# Patient Record
Sex: FEMALE | Race: BLACK OR AFRICAN AMERICAN | NOT HISPANIC OR LATINO | Employment: OTHER | URBAN - METROPOLITAN AREA
[De-identification: names, ages, dates, MRNs, and addresses within clinical notes are randomized per-mention and may not be internally consistent; named-entity substitution may affect disease eponyms.]

---

## 2017-04-26 ENCOUNTER — OFFICE VISIT (OUTPATIENT)
Dept: INTERNAL MEDICINE | Facility: CLINIC | Age: 57
End: 2017-04-26
Payer: COMMERCIAL

## 2017-04-26 VITALS
BODY MASS INDEX: 26.27 KG/M2 | OXYGEN SATURATION: 99 % | DIASTOLIC BLOOD PRESSURE: 64 MMHG | SYSTOLIC BLOOD PRESSURE: 112 MMHG | HEIGHT: 64 IN | HEART RATE: 71 BPM | WEIGHT: 153.88 LBS

## 2017-04-26 DIAGNOSIS — F41.8 DEPRESSION WITH ANXIETY: ICD-10-CM

## 2017-04-26 DIAGNOSIS — E11.9 TYPE 2 DIABETES MELLITUS WITHOUT COMPLICATION, WITHOUT LONG-TERM CURRENT USE OF INSULIN: ICD-10-CM

## 2017-04-26 DIAGNOSIS — E04.2 MULTINODULAR GOITER (NONTOXIC): ICD-10-CM

## 2017-04-26 DIAGNOSIS — M75.02 ADHESIVE CAPSULITIS OF LEFT SHOULDER: ICD-10-CM

## 2017-04-26 DIAGNOSIS — K21.9 GERD WITHOUT ESOPHAGITIS: ICD-10-CM

## 2017-04-26 DIAGNOSIS — M48.02 FORAMINAL STENOSIS OF CERVICAL REGION: ICD-10-CM

## 2017-04-26 DIAGNOSIS — Z00.00 VISIT FOR ANNUAL HEALTH EXAMINATION: Primary | ICD-10-CM

## 2017-04-26 DIAGNOSIS — I10 BENIGN ESSENTIAL HTN: ICD-10-CM

## 2017-04-26 DIAGNOSIS — M72.2 PLANTAR FASCIITIS OF RIGHT FOOT: ICD-10-CM

## 2017-04-26 DIAGNOSIS — E78.2 MIXED HYPERLIPIDEMIA: ICD-10-CM

## 2017-04-26 PROCEDURE — 3074F SYST BP LT 130 MM HG: CPT | Mod: S$GLB,,, | Performed by: INTERNAL MEDICINE

## 2017-04-26 PROCEDURE — 4010F ACE/ARB THERAPY RXD/TAKEN: CPT | Mod: S$GLB,,, | Performed by: INTERNAL MEDICINE

## 2017-04-26 PROCEDURE — 3078F DIAST BP <80 MM HG: CPT | Mod: S$GLB,,, | Performed by: INTERNAL MEDICINE

## 2017-04-26 PROCEDURE — 99999 PR PBB SHADOW E&M-NEW PATIENT-LVL III: CPT | Mod: PBBFAC,,, | Performed by: INTERNAL MEDICINE

## 2017-04-26 PROCEDURE — 1160F RVW MEDS BY RX/DR IN RCRD: CPT | Mod: S$GLB,,, | Performed by: INTERNAL MEDICINE

## 2017-04-26 PROCEDURE — 99204 OFFICE O/P NEW MOD 45 MIN: CPT | Mod: S$GLB,,, | Performed by: INTERNAL MEDICINE

## 2017-04-26 RX ORDER — METFORMIN HYDROCHLORIDE EXTENDED-RELEASE TABLETS 500 MG/1
500 TABLET, FILM COATED, EXTENDED RELEASE ORAL
COMMUNITY
End: 2017-04-26

## 2017-04-26 RX ORDER — AMLODIPINE BESYLATE 5 MG/1
TABLET ORAL
COMMUNITY
Start: 2017-04-23 | End: 2017-04-26 | Stop reason: SDUPTHER

## 2017-04-26 RX ORDER — LISINOPRIL 40 MG/1
40 TABLET ORAL DAILY
Qty: 90 TABLET | Refills: 1 | Status: SHIPPED | OUTPATIENT
Start: 2017-04-26 | End: 2017-12-13 | Stop reason: SDUPTHER

## 2017-04-26 RX ORDER — METOPROLOL SUCCINATE 50 MG/1
50 TABLET, EXTENDED RELEASE ORAL DAILY
Qty: 90 TABLET | Refills: 1 | Status: SHIPPED | OUTPATIENT
Start: 2017-04-26 | End: 2017-12-13 | Stop reason: SDUPTHER

## 2017-04-26 RX ORDER — PRAVASTATIN SODIUM 80 MG/1
80 TABLET ORAL DAILY
Qty: 90 TABLET | Refills: 1 | Status: SHIPPED | OUTPATIENT
Start: 2017-04-26 | End: 2017-04-28

## 2017-04-26 RX ORDER — DORZOLAMIDE HYDROCHLORIDE AND TIMOLOL MALEATE 20; 5 MG/ML; MG/ML
SOLUTION/ DROPS OPHTHALMIC
Refills: 5 | COMMUNITY
Start: 2017-03-02

## 2017-04-26 RX ORDER — SERTRALINE HYDROCHLORIDE 25 MG/1
25 TABLET, FILM COATED ORAL DAILY
Qty: 90 TABLET | Refills: 1 | Status: SHIPPED | OUTPATIENT
Start: 2017-04-26 | End: 2017-12-13 | Stop reason: SDUPTHER

## 2017-04-26 RX ORDER — METFORMIN HYDROCHLORIDE 500 MG/1
500 TABLET ORAL 2 TIMES DAILY WITH MEALS
Qty: 180 TABLET | Refills: 1 | Status: SHIPPED | OUTPATIENT
Start: 2017-04-26 | End: 2017-12-13

## 2017-04-26 RX ORDER — LATANOPROST 50 UG/ML
SOLUTION/ DROPS OPHTHALMIC
COMMUNITY
Start: 2017-04-23

## 2017-04-26 RX ORDER — AMLODIPINE BESYLATE 5 MG/1
5 TABLET ORAL DAILY
Qty: 90 TABLET | Refills: 1 | Status: SHIPPED | OUTPATIENT
Start: 2017-04-26 | End: 2017-12-13 | Stop reason: SDUPTHER

## 2017-04-26 RX ORDER — LISINOPRIL 40 MG/1
40 TABLET ORAL DAILY
Refills: 1 | COMMUNITY
Start: 2017-03-02 | End: 2017-04-26 | Stop reason: SDUPTHER

## 2017-04-26 NOTE — MR AVS SNAPSHOT
[facility]  [address]  [facility] [address]  Phone: [phone]  Fax: [phone]                  Suni Rodríguez   2017 11:00 AM   Office Visit    Description:  Female : 1960   Provider:  Jade Chin MD   Department:  [facility]           Reason for Visit     Establish Care           Diagnoses this Visit        Comments    Type 2 diabetes mellitus without complication, without long-term current use of insulin         Benign essential HTN         Mixed hyperlipidemia         Depression with anxiety         Multinodular goiter (nontoxic)         GERD without esophagitis         Adhesive capsulitis of left shoulder                To Do List           Future Appointments        Provider Department Dept Phone    2017 11:50 AM LAB, APPOINTMENT [facility] [phone]    2017 12:10 PM LAB, APPOINTMENT [facility] [phone]      Goals (5 Years of Data)     None       These Medications        Disp Refills Start End    metformin (GLUCOPHAGE) 500 MG tablet 180 tablet 1 2017    Take 1 tablet (500 mg total) by mouth 2 (two) times daily with meals. - Oral    Pharmacy: [facility]/pharmacy #[address] [place], [place] [address]  [address] Ph #: [phone]       pravastatin (PRAVACHOL) 80 MG tablet 90 tablet 1 2017    Take 1 tablet (80 mg total) by mouth once daily. - Oral    Pharmacy: [facility]/pharmacy #[address] [place], [place] [address] - [address] Ph #: [phone]       metoprolol succinate (TOPROL-XL) 50 MG 24 hr tablet 90 tablet 1 2017    Take 1 tablet (50 mg total) by mouth once daily. - Oral    Pharmacy: [facility]/pharmacy #[address] [place] [address] [place] [address] Ph #: [phone]       sertraline (ZOLOFT) 25 MG tablet 90 tablet 1 2017    Take 1 tablet (25 mg total) by mouth once daily. - Oral    Pharmacy: [facility]/pharmacy #[address] [place] [address]  [facility] [address] [facility] Ph #: [phone]       amlodipine (NORVASC) 5 MG tablet 90 tablet 1 4/26/2017     Take 1 tablet (5 mg total) by mouth once daily. - Oral    Pharmacy: [facility] [id number] [place] [address] [facility] Ph #: [phone]       lisinopril (PRINIVIL,ZESTRIL) 40 MG tablet 90 tablet 1 4/26/2017     Take 1 tablet (40 mg total) by mouth once daily. - Oral    Pharmacy: [facility]/pharmacy #[id number] - [place] - [address] [facility] Ph #: [phone]         [facility]     [facility] Nurse Care Line - 24/7 Assistance  Unless otherwise directed by your provider, please contact [facility], our nurse care line that is available for 24/7 assistance.     Registered nurses in the [facility] provide: appointment scheduling, clinical advisement, health education, and other advisory services.  Call: [phone] (toll free)               Medications           Message regarding Medications     Verify the changes and/or additions to your medication regime listed below are the same as discussed with your clinician today.  If any of these changes or additions are incorrect, please notify your healthcare provider.        START taking these NEW medications        Refills    metformin (GLUCOPHAGE) 500 MG tablet 1    Sig: Take 1 tablet (500 mg total) by mouth 2 (two) times daily with meals.    Class: Normal    Route: Oral    pravastatin (PRAVACHOL) 80 MG tablet 1    Sig: Take 1 tablet (80 mg total) by mouth once daily.    Class: Normal    Route: Oral    metoprolol succinate (TOPROL-XL) 50 MG 24 hr tablet 1    Sig: Take 1 tablet (50 mg total) by mouth once daily.    Class: Normal    Route: Oral    sertraline (ZOLOFT) 25 MG tablet 1    Sig: Take 1 tablet (25 mg total) by mouth once daily.    Class: Normal    Route: Oral    amlodipine (NORVASC) 5 MG tablet 1    Sig: Take 1 tablet (5 mg total) by mouth once daily.    Class: Normal    Route: Oral    lisinopril (PRINIVIL,ZESTRIL) 40 MG tablet  "1    Sig: Take 1 tablet (40 mg total) by mouth once daily.    Class: Normal    Route: Oral      STOP taking these medications     metformin (FORTAMET) 500 mg 24 hr tablet Take 500 mg by mouth daily with breakfast.    METOPROLOL TARTRATE ORAL Take 50 mg by mouth once daily.           Verify that the below list of medications is an accurate representation of the medications you are currently taking.  If none reported, the list may be blank. If incorrect, please contact your healthcare provider. Carry this list with you in case of emergency.           Current Medications     amlodipine (NORVASC) 5 MG tablet Take 1 tablet (5 mg total) by mouth once daily.    dorzolamide-timolol 2-0.5% (COSOPT) 22.3-6.8 mg/mL ophthalmic solution INSTILL 1 DROP INTO BOTH EYES 2 TIMES DAILY.    latanoprost 0.005 % ophthalmic solution     lisinopril (PRINIVIL,ZESTRIL) 40 MG tablet Take 1 tablet (40 mg total) by mouth once daily.    metformin (GLUCOPHAGE) 500 MG tablet Take 1 tablet (500 mg total) by mouth 2 (two) times daily with meals.    metoprolol succinate (TOPROL-XL) 50 MG 24 hr tablet Take 1 tablet (50 mg total) by mouth once daily.    pravastatin (PRAVACHOL) 80 MG tablet Take 1 tablet (80 mg total) by mouth once daily.    sertraline (ZOLOFT) 25 MG tablet Take 1 tablet (25 mg total) by mouth once daily.           Clinical Reference Information           Your Vitals Were     BP Pulse Height Weight SpO2 BMI    112/64 (BP Location: Left arm, Patient Position: Sitting) 71 5' 4" (1.626 m) 69.8 kg (153 lb 14.1 oz) 99% 26.41 kg/m2      Blood Pressure          Most Recent Value    BP  112/64      Allergies as of 4/26/2017     Penicillins      Immunizations Administered on Date of Encounter - 4/26/2017     None      Orders Placed During Today's Visit     Future Labs/Procedures Expected by Expires    Comprehensive metabolic panel  4/26/2017 6/25/2018    Hemoglobin A1c  4/26/2017 6/25/2018    Lipid panel  4/26/2017 6/25/2018    MICROALBUMIN / " CREATININE RATIO URINE  4/26/2017 6/25/2018      [facility] Sign-Up     Activating your [facility] account is as easy as 1-2-3!     1) Visit [email or web address], select Sign Up Now, enter this activation code and your date of birth, then select Next.  [email or web address]  Expires: 6/10/2017 10:12 AM      2) Create a username and password to use when you visit [facility] in the future and select a security question in case you lose your password and select Next.    3) Enter your e-mail address and click Sign Up!    Additional Information  If you have questions, please e-mail [email or web address] or call [phone] to talk to our [facility] staff. Remember, [facility] is NOT to be used for urgent needs. For medical emergencies, dial 911.         Language Assistance Services     ATTENTION: Language assistance services are available, free of charge. Please call [phone].      ATENCIÓN: Si habla español, tiene a yarbrough disposición servicios gratuitos de asistencia lingüística. Llame al [phone].     HOWARD Ý: N?u b?n nói Ti?ng Vi?t, có các d?ch v? h? tr? ngôn ng? mi?n phí dành cho b?n. G?i s? [phone].         [facility] complies with applicable Federal civil rights laws and does not discriminate on the basis of race, color, national origin, age, disability, or sex.

## 2017-04-26 NOTE — PROGRESS NOTES
"INTERNAL MEDICINE INITIAL VISIT NOTE      CHIEF COMPLAINT     Chief Complaint   Patient presents with    Establish Care       HPI     Suni Rodríguez is a 56 y.o. AA female who presents with a PMHx of :    Past Medical History:  Past Medical History:   Diagnosis Date    Adhesive capsulitis of left shoulder     Benign essential HTN     Depression with anxiety     GERD without esophagitis     Glaucoma     Mixed hyperlipidemia     Multinodular goiter (nontoxic)     Uncomplicated type 2 diabetes mellitus      Here to re-establish care from [facility].  Pt known to me c last visit at [facility] in Dec 2016.  At last visit in Dec, pt reported some worsening anxiety sx.  Had prev been on Sertraline but says it "made her too happy" so was started on Fluoxetine instead.  Says  Fluoxetine made her feel depressed and made her mood worse so she stopped taking it and would like to get back on Sertraline at a low dose.    Says she has only been taking metformin once daily but taking all other meds as rx'ed.    Also notes some R foot pain, from just under her R heel towards the middle of her arch which started about a week or two ago.  Thinks she got out of bed too quickly.  Says her pain improved but then waxes and wanes.  No swelling.  Has not tried taking anything for it.    Denies cp or sob.  No wheezing or cough.      Past Surgical History:  History reviewed. No pertinent surgical history.    Home Medications:  Prior to Admission medications    Medication Sig Start Date End Date Taking? Authorizing Provider   amlodipine (NORVASC) 5 MG tablet Take 1 tablet (5 mg total) by mouth once daily. 4/26/17  Yes Jade Chin MD   dorzolamide-timolol 2-0.5% (COSOPT) 22.3-6.8 mg/mL ophthalmic solution INSTILL 1 DROP INTO BOTH EYES 2 TIMES DAILY. 3/2/17  Yes Historical Provider, MD   latanoprost 0.005 % ophthalmic solution  4/23/17  Yes Historical Provider, MD   lisinopril (PRINIVIL,ZESTRIL) 40 MG tablet Take 1 tablet (40 mg total) by mouth " once daily. 4/26/17  Yes Jade Chin MD   amlodipine (NORVASC) 5 MG tablet  4/23/17 4/26/17 Yes Historical Provider, MD   lisinopril (PRINIVIL,ZESTRIL) 40 MG tablet Take 40 mg by mouth once daily. 3/2/17 4/26/17 Yes Historical Provider, MD   metformin (FORTAMET) 500 mg 24 hr tablet Take 500 mg by mouth daily with breakfast.  4/26/17 Yes Historical Provider, MD   METOPROLOL TARTRATE ORAL Take 50 mg by mouth once daily.  4/26/17 Yes Historical Provider, MD   metformin (GLUCOPHAGE) 500 MG tablet Take 1 tablet (500 mg total) by mouth 2 (two) times daily with meals. 4/26/17 4/26/18  Jade Chin MD   metoprolol succinate (TOPROL-XL) 50 MG 24 hr tablet Take 1 tablet (50 mg total) by mouth once daily. 4/26/17 4/26/18  Jade Chin MD   pravastatin (PRAVACHOL) 80 MG tablet Take 1 tablet (80 mg total) by mouth once daily. 4/26/17 4/26/18  Jade Chin MD   sertraline (ZOLOFT) 25 MG tablet Take 1 tablet (25 mg total) by mouth once daily. 4/26/17 4/26/18  Jade Chin MD       Allergies:  Review of patient's allergies indicates:   Allergen Reactions    Penicillins Hives       Family History:  Family History   Problem Relation Age of Onset    Coronary artery disease Mother      hx MI    Diabetes Mother     Hypertension Mother     Thyroid disease Mother     Hypertension Father     Prostate cancer Father     Glaucoma Father     Asthma Sister        Social History:  Social History   Substance Use Topics    Smoking status: Former Smoker     Packs/day: 1.00     Years: 20.00    Smokeless tobacco: None      Comment: quit smoking in 1996    Alcohol use Yes      Comment: rare use       Review of Systems:  Review of Systems   Constitutional: Negative for appetite change, chills, fatigue, fever and unexpected weight change.   HENT: Negative for congestion, hearing loss and rhinorrhea.    Eyes: Negative for pain and visual disturbance.   Respiratory: Negative for cough, chest tightness, shortness of breath and wheezing.   "  Cardiovascular: Negative for chest pain, palpitations and leg swelling.   Gastrointestinal: Negative for abdominal distention and abdominal pain.   Endocrine: Negative for polydipsia and polyuria.   Genitourinary: Negative for decreased urine volume, difficulty urinating, dysuria, hematuria and vaginal discharge.   Neurological: Negative for weakness, numbness and headaches.   Psychiatric/Behavioral: Negative for behavioral problems and confusion.       Health Maintenance:   Immunizations:   Influenza is up to date 12/2016  TDap is up to date 1/2016  Pneumovax is up to date. 2013 since DM    Cancer Screening:  PAP: is up to date. 10/2015 neg for malignancy, neg HPV via Dr. Coelho  Mammogram: is up to date. 12/2016 benign at [facility]  Colonoscopy: is up to date. 11/2009 c tubular adenoma, repeat csc 3/2016 c severe diverticulosis L colon, rec f/u 5 yrs based on polyp hx per report (Dr. Ruiz)  DEXA:  is up to date. 9/2015    PHYSICAL EXAM     /64 (BP Location: Left arm, Patient Position: Sitting)  Pulse 71  Ht 5' 4" (1.626 m)  Wt 69.8 kg (153 lb 14.1 oz)  SpO2 99%  BMI 26.41 kg/m2    GEN - A+OX4, NAD   HEENT - PERRL, EOMI, OP clear  Neck - No thyromegaly or cervical LAD. No thyroid masses felt.  CV - RRR, no m/r/g  Chest - CTAB, no wheezing, crackles, or rhonchi  Abd - S/NT/ND/+BS.   Ext - 2+BDP and radial pulses. No C/C/E.  Neuro - 5/5 BUE and BLE strength.  LN - No axillary or inguinal LAD appreciated.  Skin - Normal color and texture, no rash, no skin lesions.  Foot exam completed today.  No decreased sensation with monofilament bilaterally.  No ulcerations noted.  No calluses.  2+ distal pulses bilaterally.  Some tightness noted along plantar fascia      LABS     To be done today    ASSESSMENT/PLAN     Suni Rodríguez is a 56 y.o. female with  Suni was seen today for establish care.    Diagnoses and all orders for this visit:    Visit for annual health examination  --hx and physical exam completed, " HM reviewed.    Type 2 diabetes mellitus without complication, without long-term current use of insulin  -     Last a1c in Nov 6.6, well controlled, will cont metformin (GLUCOPHAGE) 500 MG tablet; Take 1 tablet (500 mg total) by mouth 2 (two) times daily with meals. (although pt has only been taking once daily).  Will repeat labs now.    - ophtho appt utd, last seen 3/2017 at [facility], on drops for glaucoma.  - Foot exam completed today as above.  -     Comprehensive metabolic panel; Future; Expected date: 4/26/17  -     Hemoglobin A1c; Future; Expected date: 4/26/17  -     MICROALBUMIN / CREATININE RATIO URINE; Future; Expected date: 4/26/17    Benign essential HTN  -     At goal on current regimen.  Cont meds but change metoprolol to XL (pt was taking tartrate once daily), refills sent for all bp meds.  - metoprolol succinate (TOPROL-XL) 50 MG 24 hr tablet; Take 1 tablet (50 mg total) by mouth once daily.  -     amlodipine (NORVASC) 5 MG tablet; Take 1 tablet (5 mg total) by mouth once daily.  -     lisinopril (PRINIVIL,ZESTRIL) 40 MG tablet; Take 1 tablet (40 mg total) by mouth once daily.    Mixed hyperlipidemia  -     Last LDL at goal, 94 in Nov from [facility].  - Will cont pravastatin (PRAVACHOL) 80 MG tablet; Take 1 tablet (80 mg total) by mouth once daily and recheck labs:  -     Lipid panel; Future; Expected date: 4/26/17    Depression with anxiety  -     As per hpi, worsening sx on Fluoxetine so will change back to sertraline (ZOLOFT) 25 MG tablet; Take 1 tablet (25 mg total) by mouth once daily.    Multinodular goiter (nontoxic)   -last TSH in Nov wnl.  Had FNA done recently at [facility] as ordered by Dr. Carlsno but says she never got records, will request today, advised pt to call back to Dr. Carlson's office for results.     GERD without esophagitis   - stable, no acute issues.    Plantar fasciitis R foot   -as per hpi, advised to avoid walking barefoot, avoid flip flops, wears shoes c good cushioned  support.   -counseled on stretching exercises, can also take Naproxen otc bid for 5 days, then stop.    Adhesive capsulitis of left shoulder   -also c hx cervical foraminal stenosis on MRI at [facility] at C6/C7 (prev seeing Dr. Meyer).   -sx c continued improvement, still doing PT and doing well, cont conservative mgmt.    Hx uterine fibroids--no issues, f/u gyn as needed.    Hx vit d Def--last check wnl, can cont maintenance dose.    HM as above    RTC in 6 months, sooner if needed and depending on labs.    Jade Chin MD  Department of Internal Medicine - [facility]  04/26/2017  12:07 PM

## 2017-04-28 ENCOUNTER — TELEPHONE (OUTPATIENT)
Dept: INTERNAL MEDICINE | Facility: CLINIC | Age: 57
End: 2017-04-28

## 2017-04-28 DIAGNOSIS — E78.2 MIXED HYPERLIPIDEMIA: Primary | ICD-10-CM

## 2017-04-28 RX ORDER — ATORVASTATIN CALCIUM 80 MG/1
80 TABLET, FILM COATED ORAL DAILY
Qty: 90 TABLET | Refills: 1 | Status: SHIPPED | OUTPATIENT
Start: 2017-04-28 | End: 2018-01-30 | Stop reason: SDUPTHER

## 2017-04-28 NOTE — TELEPHONE ENCOUNTER
Left detailed VM message regarding results as we have called several times at both numbers, no answer.  DM at goal.  Cholesterol elevated, will stop pravastatin and send rx for Atorvastatin 80.  Pt to call back if questions.

## 2017-11-27 ENCOUNTER — IMMUNIZATION (OUTPATIENT)
Dept: INTERNAL MEDICINE | Facility: CLINIC | Age: 57
End: 2017-11-27
Payer: COMMERCIAL

## 2017-11-27 PROCEDURE — 90686 IIV4 VACC NO PRSV 0.5 ML IM: CPT | Mod: S$GLB,,, | Performed by: INTERNAL MEDICINE

## 2017-11-27 PROCEDURE — 90471 IMMUNIZATION ADMIN: CPT | Mod: S$GLB,,, | Performed by: INTERNAL MEDICINE

## 2017-12-13 ENCOUNTER — HOSPITAL ENCOUNTER (OUTPATIENT)
Dept: RADIOLOGY | Facility: HOSPITAL | Age: 57
Discharge: HOME | End: 2017-12-13
Attending: INTERNAL MEDICINE
Payer: COMMERCIAL

## 2017-12-13 ENCOUNTER — OFFICE VISIT (OUTPATIENT)
Dept: INTERNAL MEDICINE | Facility: CLINIC | Age: 57
End: 2017-12-13
Payer: COMMERCIAL

## 2017-12-13 VITALS
BODY MASS INDEX: 25.78 KG/M2 | HEIGHT: 64 IN | WEIGHT: 151 LBS | OXYGEN SATURATION: 99 % | SYSTOLIC BLOOD PRESSURE: 108 MMHG | DIASTOLIC BLOOD PRESSURE: 62 MMHG | HEART RATE: 74 BPM

## 2017-12-13 DIAGNOSIS — F41.8 DEPRESSION WITH ANXIETY: ICD-10-CM

## 2017-12-13 DIAGNOSIS — E78.2 MIXED HYPERLIPIDEMIA: ICD-10-CM

## 2017-12-13 DIAGNOSIS — E11.9 TYPE 2 DIABETES MELLITUS WITHOUT COMPLICATION, WITHOUT LONG-TERM CURRENT USE OF INSULIN: Primary | ICD-10-CM

## 2017-12-13 DIAGNOSIS — K21.9 GERD WITHOUT ESOPHAGITIS: ICD-10-CM

## 2017-12-13 DIAGNOSIS — Z12.31 VISIT FOR SCREENING MAMMOGRAM: ICD-10-CM

## 2017-12-13 DIAGNOSIS — I10 BENIGN ESSENTIAL HTN: ICD-10-CM

## 2017-12-13 DIAGNOSIS — E04.2 MULTINODULAR GOITER (NONTOXIC): ICD-10-CM

## 2017-12-13 PROBLEM — M72.2 PLANTAR FASCIITIS OF RIGHT FOOT: Status: RESOLVED | Noted: 2017-04-26 | Resolved: 2017-12-13

## 2017-12-13 PROCEDURE — 77067 SCR MAMMO BI INCL CAD: CPT | Mod: 26,,, | Performed by: RADIOLOGY

## 2017-12-13 PROCEDURE — 99999 PR PBB SHADOW E&M-EST. PATIENT-LVL III: CPT | Mod: PBBFAC,,, | Performed by: INTERNAL MEDICINE

## 2017-12-13 PROCEDURE — 77067 SCR MAMMO BI INCL CAD: CPT | Mod: TC

## 2017-12-13 PROCEDURE — 99214 OFFICE O/P EST MOD 30 MIN: CPT | Mod: S$GLB,,, | Performed by: INTERNAL MEDICINE

## 2017-12-13 RX ORDER — METFORMIN HYDROCHLORIDE 500 MG/1
500 TABLET, EXTENDED RELEASE ORAL
Qty: 90 TABLET | Refills: 3 | Status: SHIPPED | OUTPATIENT
Start: 2017-12-13 | End: 2019-09-03 | Stop reason: SDUPTHER

## 2017-12-13 RX ORDER — SERTRALINE HYDROCHLORIDE 25 MG/1
25 TABLET, FILM COATED ORAL DAILY
Qty: 90 TABLET | Refills: 1 | Status: SHIPPED | OUTPATIENT
Start: 2017-12-13 | End: 2018-06-17 | Stop reason: SDUPTHER

## 2017-12-13 RX ORDER — LISINOPRIL 40 MG/1
40 TABLET ORAL DAILY
Qty: 90 TABLET | Refills: 1 | Status: SHIPPED | OUTPATIENT
Start: 2017-12-13 | End: 2018-07-30 | Stop reason: SDUPTHER

## 2017-12-13 RX ORDER — AMLODIPINE BESYLATE 5 MG/1
5 TABLET ORAL DAILY
Qty: 90 TABLET | Refills: 1 | Status: SHIPPED | OUTPATIENT
Start: 2017-12-13 | End: 2018-01-24 | Stop reason: SDUPTHER

## 2017-12-13 RX ORDER — METOPROLOL SUCCINATE 50 MG/1
50 TABLET, EXTENDED RELEASE ORAL DAILY
Qty: 90 TABLET | Refills: 1 | Status: SHIPPED | OUTPATIENT
Start: 2017-12-13 | End: 2018-06-17 | Stop reason: SDUPTHER

## 2017-12-13 NOTE — PROGRESS NOTES
INTERNAL MEDICINE ESTABLISHED PATIENT VISIT NOTE    Subjective:     Chief Complaint: Follow-up  DM, HTN     Patient ID: Suni Rodríguez is a 57 y.o. female with PMHx of HTN, type 2 DM uncomplicated, depression c anxiety, GERD, glaucoma, nontoxic MNG, hx adhesive capsulitis of L shoulder, here for f/u HTN, DM.    At last appt, Fluoxetine was switched to sertraline as she reported doing well on the latter.  Feeling well currently with that regard.    At last appt, labs done which showed significant worsening of LDL and we rec changing pravastatin 80 to atorvastatin but were unable to get in touch c the pt so currently still on pravastatin.    Reports plantar fasciitis noted at last appt now resolved.    Past Medical History:   Diagnosis Date    Adhesive capsulitis of left shoulder     Benign essential HTN     Depression with anxiety     GERD without esophagitis     Glaucoma     Mixed hyperlipidemia     Multinodular goiter (nontoxic)     Uncomplicated type 2 diabetes mellitus      Medication List with Changes/Refills   New Medications    METFORMIN (GLUCOPHAGE-XR) 500 MG 24 HR TABLET    Take 1 tablet (500 mg total) by mouth daily with breakfast.   Current Medications    ATORVASTATIN (LIPITOR) 80 MG TABLET    Take 1 tablet (80 mg total) by mouth once daily.    DORZOLAMIDE-TIMOLOL 2-0.5% (COSOPT) 22.3-6.8 MG/ML OPHTHALMIC SOLUTION    INSTILL 1 DROP INTO BOTH EYES 2 TIMES DAILY.    LATANOPROST 0.005 % OPHTHALMIC SOLUTION       Changed and/or Refilled Medications    Modified Medication Previous Medication    AMLODIPINE (NORVASC) 5 MG TABLET amlodipine (NORVASC) 5 MG tablet       Take 1 tablet (5 mg total) by mouth once daily.    Take 1 tablet (5 mg total) by mouth once daily.    LISINOPRIL (PRINIVIL,ZESTRIL) 40 MG TABLET lisinopril (PRINIVIL,ZESTRIL) 40 MG tablet       Take 1 tablet (40 mg total) by mouth once daily.    Take 1 tablet (40 mg total) by mouth once daily.    METOPROLOL SUCCINATE (TOPROL-XL) 50 MG 24  HR TABLET metoprolol succinate (TOPROL-XL) 50 MG 24 hr tablet       Take 1 tablet (50 mg total) by mouth once daily.    Take 1 tablet (50 mg total) by mouth once daily.    SERTRALINE (ZOLOFT) 25 MG TABLET sertraline (ZOLOFT) 25 MG tablet       Take 1 tablet (25 mg total) by mouth once daily.    Take 1 tablet (25 mg total) by mouth once daily.   Discontinued Medications    METFORMIN (GLUCOPHAGE) 500 MG TABLET    Take 1 tablet (500 mg total) by mouth 2 (two) times daily with meals.     Review of patient's allergies indicates:   Allergen Reactions    Penicillins Hives     Social History     Social History    Marital status:      Spouse name: N/A    Number of children: N/A    Years of education: N/A     Occupational History    Not on file.     Social History Main Topics    Smoking status: Former Smoker     Packs/day: 1.00     Years: 20.00    Smokeless tobacco: Not on file      Comment: quit smoking in 1996    Alcohol use Yes      Comment: rare use    Drug use: No    Sexual activity: Not on file     Other Topics Concern    Not on file     Social History Narrative    retired        Review of Systems   Constitutional: Negative for chills, fatigue and fever.   Respiratory: Negative for cough, chest tightness and shortness of breath.    Cardiovascular: Negative for chest pain.   Gastrointestinal: Negative for abdominal pain and blood in stool.   Genitourinary: Negative for dysuria and frequency.   Psychiatric/Behavioral: Positive for dysphoric mood (improved since last appt).         Health Maintenance:   Immunizations:   Influenza is up to date 11/2017  TDap is up to date 1/2016  Pneumovax is up to date. 2013 since DM     Cancer Screening:  PAP: is up to date. 10/2015 neg for malignancy, neg HPV via Dr. Coelho  Mammogram: is up to date. 12/2016 benign at [facility], will schedule repeat here  Colonoscopy: is up to date. 11/2009 c tubular adenoma, repeat csc 3/2016 c severe diverticulosis L colon, rec f/u 5 yrs  "based on polyp hx per report (Dr. Ruiz)  DEXA:  is up to date. 9/2015 wnl    Objective:   /62 (BP Location: Left arm, Patient Position: Sitting)   Pulse 74   Ht 5' 4" (1.626 m)   Wt 68.5 kg (151 lb 0.2 oz)   SpO2 99%   BMI 25.92 kg/m²        General: AAO x3, no apparent distress  CV: RRR, no m/r/g  Pulm: Lungs CTAB, no crackles, no wheezes  Abd: s/NT/ND +BS  Extremities: no c/c/e  Foot exam completed today.  No decreased sensation with monofilament bilaterally.  No ulcerations noted.  No calluses.  2+ distal pulses bilaterally.      Labs:     Lab Results   Component Value Date    WBC 7.49 12/13/2017    HGB 12.8 12/13/2017    HCT 38.2 12/13/2017    MCV 87 12/13/2017     12/13/2017     CMP  Sodium   Date Value Ref Range Status   04/26/2017 141 136 - 145 mmol/L Final     Potassium   Date Value Ref Range Status   04/26/2017 4.8 3.5 - 5.1 mmol/L Final     Chloride   Date Value Ref Range Status   04/26/2017 107 95 - 110 mmol/L Final     CO2   Date Value Ref Range Status   04/26/2017 26 23 - 29 mmol/L Final     Glucose   Date Value Ref Range Status   04/26/2017 103 70 - 110 mg/dL Final     BUN, Bld   Date Value Ref Range Status   04/26/2017 10 6 - 20 mg/dL Final     Creatinine   Date Value Ref Range Status   04/26/2017 0.8 0.5 - 1.4 mg/dL Final     Calcium   Date Value Ref Range Status   04/26/2017 10.5 8.7 - 10.5 mg/dL Final     Total Protein   Date Value Ref Range Status   04/26/2017 7.7 6.0 - 8.4 g/dL Final     Albumin   Date Value Ref Range Status   04/26/2017 4.0 3.5 - 5.2 g/dL Final     Total Bilirubin   Date Value Ref Range Status   04/26/2017 0.4 0.1 - 1.0 mg/dL Final     Comment:     For infants and newborns, interpretation of results should be based  on gestational age, weight and in agreement with clinical  observations.  Premature Infant recommended reference ranges:  Up to 24 hours.............<8.0 mg/dL  Up to 48 hours............<12.0 mg/dL  3-5 days..................<15.0 mg/dL  6-29 " days.................<15.0 mg/dL       Alkaline Phosphatase   Date Value Ref Range Status   04/26/2017 103 55 - 135 U/L Final     AST   Date Value Ref Range Status   04/26/2017 19 10 - 40 U/L Final     ALT   Date Value Ref Range Status   04/26/2017 17 10 - 44 U/L Final     Anion Gap   Date Value Ref Range Status   04/26/2017 8 8 - 16 mmol/L Final     eGFR if    Date Value Ref Range Status   04/26/2017 >60.0 >60 mL/min/1.73 m^2 Final     eGFR if non    Date Value Ref Range Status   04/26/2017 >60.0 >60 mL/min/1.73 m^2 Final     Comment:     Calculation used to obtain the estimated glomerular filtration  rate (eGFR) is the CKD-EPI equation. Since race is unknown   in our information system, the eGFR values for   -American and Non--American patients are given   for each creatinine result.       Lab Results   Component Value Date    HGBA1C 6.6 (H) 04/26/2017     Lab Results   Component Value Date    LDLCALC 140.4 04/26/2017     No results found for: TSH      Assessment/Plan     Suni was seen today for follow-up.    Diagnoses and all orders for this visit:    Type 2 diabetes mellitus without complication, without long-term current use of insulin  At goal, stable at 6.6 on last check, will repeat labs now.  Eye exam at [facility], seen recently  Foot exam completed today  Microalb/cr 217  Has only been taking metformin once daily instead of bid, ok to change to XR  -     Comprehensive metabolic panel; Future  -     Hemoglobin A1c; Future  -     CBC auto differential; Future  -     metFORMIN (GLUCOPHAGE-XR) 500 MG 24 hr tablet; Take 1 tablet (500 mg total) by mouth daily with breakfast.    Benign essential HTN  at goal.  Cont current medications.   -     lisinopril (PRINIVIL,ZESTRIL) 40 MG tablet; Take 1 tablet (40 mg total) by mouth once daily.  -     amLODIPine (NORVASC) 5 MG tablet; Take 1 tablet (5 mg total) by mouth once daily.  -     metoprolol succinate (TOPROL-XL) 50 MG  24 hr tablet; Take 1 tablet (50 mg total) by mouth once daily.    Mixed hyperlipidemia  Lab Results   Component Value Date    LDLCALC 140.4 04/26/2017     Much worse from 94 prev at [facility].  Since pt never started rec atorvastatin, will repeat labs now and if LDL still over 100 will switch  Patient counseled on need for a low fat diet.  Avoid red meats and fried foods as well as cream-based foods and processed foods.  Recommend weight loss with diet and exercise.  -     Lipid panel; Future    Multinodular goiter (nontoxic)  Need [facility] records, prev requested from Dr. Carlson but not received, will request bx results today from [facility].  tsh last Nov wnl  -     TSH; Future    Depression with anxiety  Stable on current regimen, fluoxetine not as effective for pt  Cont meds  -     sertraline (ZOLOFT) 25 MG tablet; Take 1 tablet (25 mg total) by mouth once daily.    GERD without esophagitis  Stable on PPI when compliant c diet, no acute issues    Visit for screening mammogram  -     Mammo Digital Screening Bilat with CAD; Future        HM as above  RTC in 6 mos for f/u c labs prior, advised to call 1-2 weeks before f/u to get lab orders.    Jade Chin MD  Department of Internal Medicine - [facility]  12/13/2017

## 2017-12-15 ENCOUNTER — TELEPHONE (OUTPATIENT)
Dept: RADIOLOGY | Facility: HOSPITAL | Age: 57
End: 2017-12-15

## 2017-12-15 ENCOUNTER — TELEPHONE (OUTPATIENT)
Dept: INTERNAL MEDICINE | Facility: CLINIC | Age: 57
End: 2017-12-15

## 2017-12-15 NOTE — TELEPHONE ENCOUNTER
Spoke with patient and explained mammogram findings.Patient expressed understanding of results. Patient is scheduled for a abnormal mammogram follow up appointment at [facility] on 12/21/17

## 2017-12-19 ENCOUNTER — TELEPHONE (OUTPATIENT)
Dept: INTERNAL MEDICINE | Facility: CLINIC | Age: 57
End: 2017-12-19

## 2017-12-19 DIAGNOSIS — R92.8 ABNORMAL MAMMOGRAM: Primary | ICD-10-CM

## 2017-12-19 NOTE — TELEPHONE ENCOUNTER
mmg abnormal.  Pt notified.  Has appt for dx mmg +/- us this week, advised to keep appt.  Will place orders.

## 2017-12-20 ENCOUNTER — HOSPITAL ENCOUNTER (OUTPATIENT)
Dept: RADIOLOGY | Facility: HOSPITAL | Age: 57
Discharge: HOME | End: 2017-12-20
Attending: INTERNAL MEDICINE
Payer: COMMERCIAL

## 2017-12-20 DIAGNOSIS — R92.8 ABNORMAL MAMMOGRAM: ICD-10-CM

## 2017-12-20 PROCEDURE — 77061 BREAST TOMOSYNTHESIS UNI: CPT | Mod: TC,PO,LT

## 2017-12-20 PROCEDURE — 76642 ULTRASOUND BREAST LIMITED: CPT | Mod: TC,PO,LT

## 2017-12-20 PROCEDURE — 77065 DX MAMMO INCL CAD UNI: CPT | Mod: 26,LT,, | Performed by: STUDENT IN AN ORGANIZED HEALTH CARE EDUCATION/TRAINING PROGRAM

## 2017-12-20 PROCEDURE — 77061 BREAST TOMOSYNTHESIS UNI: CPT | Mod: 26,LT,, | Performed by: STUDENT IN AN ORGANIZED HEALTH CARE EDUCATION/TRAINING PROGRAM

## 2017-12-20 PROCEDURE — 76642 ULTRASOUND BREAST LIMITED: CPT | Mod: 26,LT,, | Performed by: STUDENT IN AN ORGANIZED HEALTH CARE EDUCATION/TRAINING PROGRAM

## 2018-01-24 DIAGNOSIS — I10 BENIGN ESSENTIAL HTN: ICD-10-CM

## 2018-01-24 RX ORDER — AMLODIPINE BESYLATE 5 MG/1
5 TABLET ORAL DAILY
Qty: 90 TABLET | Refills: 1 | Status: SHIPPED | OUTPATIENT
Start: 2018-01-24 | End: 2019-08-12

## 2018-01-30 DIAGNOSIS — E78.2 MIXED HYPERLIPIDEMIA: ICD-10-CM

## 2018-01-31 RX ORDER — ATORVASTATIN CALCIUM 80 MG/1
80 TABLET, FILM COATED ORAL DAILY
Qty: 90 TABLET | Refills: 1 | Status: SHIPPED | OUTPATIENT
Start: 2018-01-31 | End: 2018-08-10 | Stop reason: SDUPTHER

## 2018-06-17 DIAGNOSIS — I10 BENIGN ESSENTIAL HTN: ICD-10-CM

## 2018-06-17 DIAGNOSIS — F41.8 DEPRESSION WITH ANXIETY: ICD-10-CM

## 2018-06-18 RX ORDER — METOPROLOL SUCCINATE 50 MG/1
50 TABLET, EXTENDED RELEASE ORAL DAILY
Qty: 90 TABLET | Refills: 1 | Status: SHIPPED | OUTPATIENT
Start: 2018-06-18 | End: 2018-12-23 | Stop reason: SDUPTHER

## 2018-06-18 RX ORDER — SERTRALINE HYDROCHLORIDE 25 MG/1
25 TABLET, FILM COATED ORAL DAILY
Qty: 90 TABLET | Refills: 1 | Status: SHIPPED | OUTPATIENT
Start: 2018-06-18 | End: 2018-12-23 | Stop reason: SDUPTHER

## 2018-07-30 DIAGNOSIS — I10 BENIGN ESSENTIAL HTN: ICD-10-CM

## 2018-07-30 RX ORDER — LISINOPRIL 40 MG/1
40 TABLET ORAL DAILY
Qty: 90 TABLET | Refills: 1 | Status: SHIPPED | OUTPATIENT
Start: 2018-07-30 | End: 2019-03-27 | Stop reason: SDUPTHER

## 2018-08-06 DIAGNOSIS — E11.9 TYPE 2 DIABETES MELLITUS WITHOUT COMPLICATION: ICD-10-CM

## 2018-08-10 DIAGNOSIS — E78.2 MIXED HYPERLIPIDEMIA: ICD-10-CM

## 2018-08-10 RX ORDER — ATORVASTATIN CALCIUM 80 MG/1
80 TABLET, FILM COATED ORAL DAILY
Qty: 90 TABLET | Refills: 1 | Status: SHIPPED | OUTPATIENT
Start: 2018-08-10 | End: 2019-04-01 | Stop reason: SDUPTHER

## 2018-11-05 ENCOUNTER — PATIENT OUTREACH (OUTPATIENT)
Dept: ADMINISTRATIVE | Facility: HOSPITAL | Age: 58
End: 2018-11-05

## 2018-11-05 NOTE — PROGRESS NOTES
Attempted to contact pt to schedule follow up with PCP. No answer, left voice mail for return call.     Pt is also due for eye exam, pap & labs. Letter also sent.

## 2018-12-23 DIAGNOSIS — I10 BENIGN ESSENTIAL HTN: ICD-10-CM

## 2018-12-23 DIAGNOSIS — F41.8 DEPRESSION WITH ANXIETY: ICD-10-CM

## 2018-12-24 RX ORDER — SERTRALINE HYDROCHLORIDE 25 MG/1
25 TABLET, FILM COATED ORAL DAILY
Qty: 90 TABLET | Refills: 0 | Status: SHIPPED | OUTPATIENT
Start: 2018-12-24 | End: 2019-04-21 | Stop reason: SDUPTHER

## 2018-12-24 RX ORDER — METOPROLOL SUCCINATE 50 MG/1
50 TABLET, EXTENDED RELEASE ORAL DAILY
Qty: 90 TABLET | Refills: 0 | Status: SHIPPED | OUTPATIENT
Start: 2018-12-24 | End: 2019-03-28 | Stop reason: SDUPTHER

## 2019-02-15 DIAGNOSIS — I10 BENIGN ESSENTIAL HTN: ICD-10-CM

## 2019-02-15 RX ORDER — AMLODIPINE BESYLATE 5 MG/1
5 TABLET ORAL DAILY
Qty: 90 TABLET | Refills: 1 | Status: SHIPPED | OUTPATIENT
Start: 2019-02-15 | End: 2019-08-10 | Stop reason: SDUPTHER

## 2019-03-27 DIAGNOSIS — I10 BENIGN ESSENTIAL HTN: ICD-10-CM

## 2019-03-27 RX ORDER — LISINOPRIL 40 MG/1
40 TABLET ORAL DAILY
Qty: 90 TABLET | Refills: 0 | Status: SHIPPED | OUTPATIENT
Start: 2019-03-27 | End: 2019-06-24 | Stop reason: SDUPTHER

## 2019-03-28 DIAGNOSIS — I10 BENIGN ESSENTIAL HTN: ICD-10-CM

## 2019-03-28 RX ORDER — METOPROLOL SUCCINATE 50 MG/1
TABLET, EXTENDED RELEASE ORAL
Qty: 90 TABLET | Refills: 0 | OUTPATIENT
Start: 2019-03-28

## 2019-03-28 RX ORDER — METOPROLOL SUCCINATE 50 MG/1
50 TABLET, EXTENDED RELEASE ORAL DAILY
Qty: 30 TABLET | Refills: 0 | Status: SHIPPED | OUTPATIENT
Start: 2019-03-28 | End: 2019-05-29 | Stop reason: SDUPTHER

## 2019-04-01 DIAGNOSIS — E78.2 MIXED HYPERLIPIDEMIA: ICD-10-CM

## 2019-04-01 RX ORDER — ATORVASTATIN CALCIUM 80 MG/1
80 TABLET, FILM COATED ORAL DAILY
Qty: 90 TABLET | Refills: 0 | Status: SHIPPED | OUTPATIENT
Start: 2019-04-01 | End: 2019-07-10 | Stop reason: SDUPTHER

## 2019-04-21 DIAGNOSIS — F41.8 DEPRESSION WITH ANXIETY: ICD-10-CM

## 2019-04-22 RX ORDER — SERTRALINE HYDROCHLORIDE 25 MG/1
TABLET, FILM COATED ORAL
Qty: 30 TABLET | Refills: 0 | Status: SHIPPED | OUTPATIENT
Start: 2019-04-22 | End: 2019-05-29 | Stop reason: SDUPTHER

## 2019-05-29 DIAGNOSIS — F41.8 DEPRESSION WITH ANXIETY: ICD-10-CM

## 2019-05-29 DIAGNOSIS — I10 BENIGN ESSENTIAL HTN: ICD-10-CM

## 2019-05-29 RX ORDER — METOPROLOL SUCCINATE 50 MG/1
TABLET, EXTENDED RELEASE ORAL
Qty: 30 TABLET | Refills: 0 | Status: SHIPPED | OUTPATIENT
Start: 2019-05-29 | End: 2019-06-27 | Stop reason: SDUPTHER

## 2019-05-29 RX ORDER — SERTRALINE HYDROCHLORIDE 25 MG/1
TABLET, FILM COATED ORAL
Qty: 30 TABLET | Refills: 0 | Status: SHIPPED | OUTPATIENT
Start: 2019-05-29 | End: 2019-11-27 | Stop reason: SDUPTHER

## 2019-06-24 DIAGNOSIS — I10 BENIGN ESSENTIAL HTN: ICD-10-CM

## 2019-06-24 RX ORDER — LISINOPRIL 40 MG/1
40 TABLET ORAL DAILY
Qty: 30 TABLET | Refills: 0 | Status: SHIPPED | OUTPATIENT
Start: 2019-06-24 | End: 2019-06-24 | Stop reason: SDUPTHER

## 2019-06-24 RX ORDER — LISINOPRIL 40 MG/1
40 TABLET ORAL DAILY
Qty: 90 TABLET | Refills: 0 | Status: SHIPPED | OUTPATIENT
Start: 2019-06-24 | End: 2019-07-29

## 2019-06-24 NOTE — TELEPHONE ENCOUNTER
Dr. Chin,   I have scheduled patient for the first available which is not until September 11, she will need a refill on her medication prior to this appt.  Thanks

## 2019-06-27 DIAGNOSIS — I10 BENIGN ESSENTIAL HTN: ICD-10-CM

## 2019-06-27 RX ORDER — METOPROLOL SUCCINATE 50 MG/1
TABLET, EXTENDED RELEASE ORAL
Qty: 30 TABLET | Refills: 0 | Status: SHIPPED | OUTPATIENT
Start: 2019-06-27 | End: 2019-07-22 | Stop reason: SDUPTHER

## 2019-07-10 DIAGNOSIS — E78.2 MIXED HYPERLIPIDEMIA: ICD-10-CM

## 2019-07-10 RX ORDER — ATORVASTATIN CALCIUM 80 MG/1
TABLET, FILM COATED ORAL
Qty: 90 TABLET | Refills: 0 | Status: SHIPPED | OUTPATIENT
Start: 2019-07-10 | End: 2019-10-12 | Stop reason: SDUPTHER

## 2019-07-22 DIAGNOSIS — I10 BENIGN ESSENTIAL HTN: ICD-10-CM

## 2019-07-22 RX ORDER — METOPROLOL SUCCINATE 50 MG/1
TABLET, EXTENDED RELEASE ORAL
Qty: 90 TABLET | Refills: 0 | Status: SHIPPED | OUTPATIENT
Start: 2019-07-22 | End: 2019-10-18 | Stop reason: SDUPTHER

## 2019-07-27 DIAGNOSIS — I10 BENIGN ESSENTIAL HTN: ICD-10-CM

## 2019-07-29 RX ORDER — LISINOPRIL 40 MG/1
TABLET ORAL
Qty: 30 TABLET | Refills: 0 | Status: SHIPPED | OUTPATIENT
Start: 2019-07-29 | End: 2019-09-23

## 2019-08-10 DIAGNOSIS — I10 BENIGN ESSENTIAL HTN: ICD-10-CM

## 2019-08-12 RX ORDER — AMLODIPINE BESYLATE 5 MG/1
TABLET ORAL
Qty: 30 TABLET | Refills: 0 | Status: SHIPPED | OUTPATIENT
Start: 2019-08-12 | End: 2019-09-10 | Stop reason: SDUPTHER

## 2019-09-03 DIAGNOSIS — E11.9 TYPE 2 DIABETES MELLITUS WITHOUT COMPLICATION, WITHOUT LONG-TERM CURRENT USE OF INSULIN: ICD-10-CM

## 2019-09-03 RX ORDER — METFORMIN HYDROCHLORIDE 500 MG/1
500 TABLET, EXTENDED RELEASE ORAL
Qty: 90 TABLET | Refills: 0 | Status: SHIPPED | OUTPATIENT
Start: 2019-09-03 | End: 2019-11-27 | Stop reason: SDUPTHER

## 2019-09-10 DIAGNOSIS — I10 BENIGN ESSENTIAL HTN: ICD-10-CM

## 2019-09-10 RX ORDER — AMLODIPINE BESYLATE 5 MG/1
TABLET ORAL
Qty: 30 TABLET | Refills: 0 | Status: SHIPPED | OUTPATIENT
Start: 2019-09-10 | End: 2019-10-13 | Stop reason: SDUPTHER

## 2019-09-20 DIAGNOSIS — I10 BENIGN ESSENTIAL HTN: ICD-10-CM

## 2019-09-23 RX ORDER — LISINOPRIL 40 MG/1
TABLET ORAL
Qty: 90 TABLET | Refills: 1 | Status: SHIPPED | OUTPATIENT
Start: 2019-09-23 | End: 2020-01-30 | Stop reason: SDUPTHER

## 2019-10-12 DIAGNOSIS — E78.2 MIXED HYPERLIPIDEMIA: ICD-10-CM

## 2019-10-13 DIAGNOSIS — I10 BENIGN ESSENTIAL HTN: ICD-10-CM

## 2019-10-14 RX ORDER — ATORVASTATIN CALCIUM 80 MG/1
TABLET, FILM COATED ORAL
Qty: 90 TABLET | Refills: 0 | Status: SHIPPED | OUTPATIENT
Start: 2019-10-14 | End: 2020-01-13

## 2019-10-14 RX ORDER — AMLODIPINE BESYLATE 5 MG/1
TABLET ORAL
Qty: 30 TABLET | Refills: 0 | Status: SHIPPED | OUTPATIENT
Start: 2019-10-14 | End: 2019-12-23

## 2019-10-18 DIAGNOSIS — I10 BENIGN ESSENTIAL HTN: ICD-10-CM

## 2019-10-18 RX ORDER — METOPROLOL SUCCINATE 50 MG/1
TABLET, EXTENDED RELEASE ORAL
Qty: 90 TABLET | Refills: 0 | Status: SHIPPED | OUTPATIENT
Start: 2019-10-18 | End: 2020-01-13

## 2019-11-27 DIAGNOSIS — F41.8 DEPRESSION WITH ANXIETY: ICD-10-CM

## 2019-11-27 DIAGNOSIS — E11.9 TYPE 2 DIABETES MELLITUS WITHOUT COMPLICATION, WITHOUT LONG-TERM CURRENT USE OF INSULIN: ICD-10-CM

## 2019-11-27 RX ORDER — SERTRALINE HYDROCHLORIDE 25 MG/1
25 TABLET, FILM COATED ORAL DAILY
Qty: 90 TABLET | Refills: 3 | Status: SHIPPED | OUTPATIENT
Start: 2019-11-27 | End: 2020-03-05 | Stop reason: SDUPTHER

## 2019-11-27 RX ORDER — METFORMIN HYDROCHLORIDE 500 MG/1
500 TABLET, EXTENDED RELEASE ORAL
Qty: 90 TABLET | Refills: 0 | Status: SHIPPED | OUTPATIENT
Start: 2019-11-27 | End: 2020-01-30 | Stop reason: SDUPTHER

## 2019-12-23 DIAGNOSIS — I10 BENIGN ESSENTIAL HTN: ICD-10-CM

## 2019-12-23 RX ORDER — AMLODIPINE BESYLATE 5 MG/1
TABLET ORAL
Qty: 30 TABLET | Refills: 0 | Status: SHIPPED | OUTPATIENT
Start: 2019-12-23 | End: 2020-01-30 | Stop reason: SDUPTHER

## 2020-01-13 DIAGNOSIS — I10 BENIGN ESSENTIAL HTN: ICD-10-CM

## 2020-01-13 DIAGNOSIS — E78.2 MIXED HYPERLIPIDEMIA: ICD-10-CM

## 2020-01-13 RX ORDER — METOPROLOL SUCCINATE 50 MG/1
TABLET, EXTENDED RELEASE ORAL
Qty: 90 TABLET | Refills: 0 | Status: SHIPPED | OUTPATIENT
Start: 2020-01-13 | End: 2020-01-30 | Stop reason: SDUPTHER

## 2020-01-13 RX ORDER — ATORVASTATIN CALCIUM 80 MG/1
TABLET, FILM COATED ORAL
Qty: 90 TABLET | Refills: 0 | Status: SHIPPED | OUTPATIENT
Start: 2020-01-13 | End: 2020-03-05 | Stop reason: SDUPTHER

## 2020-01-30 DIAGNOSIS — E11.9 TYPE 2 DIABETES MELLITUS WITHOUT COMPLICATION, WITHOUT LONG-TERM CURRENT USE OF INSULIN: ICD-10-CM

## 2020-01-30 DIAGNOSIS — I10 BENIGN ESSENTIAL HTN: ICD-10-CM

## 2020-01-30 RX ORDER — LISINOPRIL 40 MG/1
40 TABLET ORAL DAILY
Qty: 90 TABLET | Refills: 0 | Status: SHIPPED | OUTPATIENT
Start: 2020-01-30 | End: 2020-03-05 | Stop reason: SDUPTHER

## 2020-01-30 RX ORDER — AMLODIPINE BESYLATE 5 MG/1
5 TABLET ORAL DAILY
Qty: 90 TABLET | Refills: 0 | Status: SHIPPED | OUTPATIENT
Start: 2020-01-30 | End: 2020-03-05 | Stop reason: SDUPTHER

## 2020-01-30 RX ORDER — METOPROLOL SUCCINATE 50 MG/1
50 TABLET, EXTENDED RELEASE ORAL DAILY
Qty: 90 TABLET | Refills: 0 | Status: SHIPPED | OUTPATIENT
Start: 2020-01-30 | End: 2020-03-05 | Stop reason: SDUPTHER

## 2020-01-30 RX ORDER — METFORMIN HYDROCHLORIDE 500 MG/1
500 TABLET, EXTENDED RELEASE ORAL
Qty: 90 TABLET | Refills: 0 | Status: SHIPPED | OUTPATIENT
Start: 2020-01-30 | End: 2020-03-05 | Stop reason: SDUPTHER

## 2020-01-30 NOTE — TELEPHONE ENCOUNTER
----- Message from Steven Umanzor sent at 1/30/2020 10:10 AM CST -----  Contact: self  Pt called to speak with nurse to get sooner appt than I offer in June.        Pt callback number [phone]

## 2020-03-05 ENCOUNTER — OFFICE VISIT (OUTPATIENT)
Dept: INTERNAL MEDICINE | Facility: CLINIC | Age: 60
End: 2020-03-05
Payer: COMMERCIAL

## 2020-03-05 ENCOUNTER — LAB VISIT (OUTPATIENT)
Dept: LAB | Facility: HOSPITAL | Age: 60
End: 2020-03-05
Attending: INTERNAL MEDICINE
Payer: COMMERCIAL

## 2020-03-05 ENCOUNTER — IMMUNIZATION (OUTPATIENT)
Dept: PHARMACY | Facility: CLINIC | Age: 60
End: 2020-03-05

## 2020-03-05 ENCOUNTER — IMMUNIZATION (OUTPATIENT)
Dept: PHARMACY | Facility: CLINIC | Age: 60
End: 2020-03-05
Payer: COMMERCIAL

## 2020-03-05 VITALS
DIASTOLIC BLOOD PRESSURE: 78 MMHG | SYSTOLIC BLOOD PRESSURE: 128 MMHG | HEART RATE: 80 BPM | OXYGEN SATURATION: 99 % | HEIGHT: 64 IN | BODY MASS INDEX: 24.42 KG/M2 | WEIGHT: 143.06 LBS

## 2020-03-05 DIAGNOSIS — E78.5 TYPE 2 DIABETES MELLITUS WITH HYPERLIPIDEMIA: ICD-10-CM

## 2020-03-05 DIAGNOSIS — E04.2 MULTINODULAR GOITER (NONTOXIC): ICD-10-CM

## 2020-03-05 DIAGNOSIS — H40.9 GLAUCOMA, UNSPECIFIED GLAUCOMA TYPE, UNSPECIFIED LATERALITY: ICD-10-CM

## 2020-03-05 DIAGNOSIS — Z11.59 NEED FOR HEPATITIS C SCREENING TEST: ICD-10-CM

## 2020-03-05 DIAGNOSIS — E78.2 MIXED HYPERLIPIDEMIA: ICD-10-CM

## 2020-03-05 DIAGNOSIS — E11.69 TYPE 2 DIABETES MELLITUS WITH HYPERLIPIDEMIA: ICD-10-CM

## 2020-03-05 DIAGNOSIS — K21.9 GERD WITHOUT ESOPHAGITIS: ICD-10-CM

## 2020-03-05 DIAGNOSIS — I10 BENIGN ESSENTIAL HTN: ICD-10-CM

## 2020-03-05 DIAGNOSIS — Z00.00 VISIT FOR ANNUAL HEALTH EXAMINATION: Primary | ICD-10-CM

## 2020-03-05 DIAGNOSIS — Z12.31 SCREENING MAMMOGRAM, ENCOUNTER FOR: ICD-10-CM

## 2020-03-05 DIAGNOSIS — F41.8 DEPRESSION WITH ANXIETY: ICD-10-CM

## 2020-03-05 LAB
ALBUMIN SERPL BCP-MCNC: 4.3 G/DL (ref 3.5–5.2)
ALBUMIN/CREAT UR: 5.7 UG/MG (ref 0–30)
ALP SERPL-CCNC: 132 U/L (ref 55–135)
ALT SERPL W/O P-5'-P-CCNC: 24 U/L (ref 10–44)
ANION GAP SERPL CALC-SCNC: 6 MMOL/L (ref 8–16)
AST SERPL-CCNC: 23 U/L (ref 10–40)
BASOPHILS # BLD AUTO: 0.03 K/UL (ref 0–0.2)
BASOPHILS NFR BLD: 0.4 % (ref 0–1.9)
BILIRUB SERPL-MCNC: 0.5 MG/DL (ref 0.1–1)
BUN SERPL-MCNC: 8 MG/DL (ref 8–23)
CALCIUM SERPL-MCNC: 10 MG/DL (ref 8.7–10.5)
CHLORIDE SERPL-SCNC: 106 MMOL/L (ref 95–110)
CHOLEST SERPL-MCNC: 147 MG/DL (ref 120–199)
CHOLEST/HDLC SERPL: 3.4 {RATIO} (ref 2–5)
CO2 SERPL-SCNC: 29 MMOL/L (ref 23–29)
CREAT SERPL-MCNC: 0.8 MG/DL (ref 0.5–1.4)
CREAT UR-MCNC: 281 MG/DL (ref 15–325)
DIFFERENTIAL METHOD: ABNORMAL
EOSINOPHIL # BLD AUTO: 0.1 K/UL (ref 0–0.5)
EOSINOPHIL NFR BLD: 0.9 % (ref 0–8)
ERYTHROCYTE [DISTWIDTH] IN BLOOD BY AUTOMATED COUNT: 12.4 % (ref 11.5–14.5)
EST. GFR  (AFRICAN AMERICAN): >60 ML/MIN/1.73 M^2
EST. GFR  (NON AFRICAN AMERICAN): >60 ML/MIN/1.73 M^2
ESTIMATED AVG GLUCOSE: 137 MG/DL (ref 68–131)
GLUCOSE SERPL-MCNC: 108 MG/DL (ref 70–110)
HBA1C MFR BLD HPLC: 6.4 % (ref 4–5.6)
HCT VFR BLD AUTO: 43.1 % (ref 37–48.5)
HDLC SERPL-MCNC: 43 MG/DL (ref 40–75)
HDLC SERPL: 29.3 % (ref 20–50)
HGB BLD-MCNC: 13.7 G/DL (ref 12–16)
IMM GRANULOCYTES # BLD AUTO: 0.02 K/UL (ref 0–0.04)
IMM GRANULOCYTES NFR BLD AUTO: 0.3 % (ref 0–0.5)
LDLC SERPL CALC-MCNC: 87.6 MG/DL (ref 63–159)
LYMPHOCYTES # BLD AUTO: 2.1 K/UL (ref 1–4.8)
LYMPHOCYTES NFR BLD: 30.3 % (ref 18–48)
MCH RBC QN AUTO: 29.1 PG (ref 27–31)
MCHC RBC AUTO-ENTMCNC: 31.8 G/DL (ref 32–36)
MCV RBC AUTO: 92 FL (ref 82–98)
MICROALBUMIN UR DL<=1MG/L-MCNC: 16 UG/ML
MONOCYTES # BLD AUTO: 0.6 K/UL (ref 0.3–1)
MONOCYTES NFR BLD: 7.9 % (ref 4–15)
NEUTROPHILS # BLD AUTO: 4.2 K/UL (ref 1.8–7.7)
NEUTROPHILS NFR BLD: 60.2 % (ref 38–73)
NONHDLC SERPL-MCNC: 104 MG/DL
NRBC BLD-RTO: 0 /100 WBC
PLATELET # BLD AUTO: 207 K/UL (ref 150–350)
PMV BLD AUTO: 12.6 FL (ref 9.2–12.9)
POTASSIUM SERPL-SCNC: 4.5 MMOL/L (ref 3.5–5.1)
PROT SERPL-MCNC: 8 G/DL (ref 6–8.4)
RBC # BLD AUTO: 4.71 M/UL (ref 4–5.4)
SODIUM SERPL-SCNC: 141 MMOL/L (ref 136–145)
TRIGL SERPL-MCNC: 82 MG/DL (ref 30–150)
TSH SERPL DL<=0.005 MIU/L-ACNC: 0.69 UIU/ML (ref 0.4–4)
WBC # BLD AUTO: 6.96 K/UL (ref 3.9–12.7)

## 2020-03-05 PROCEDURE — 99999 PR PBB SHADOW E&M-EST. PATIENT-LVL V: ICD-10-PCS | Mod: PBBFAC,,, | Performed by: INTERNAL MEDICINE

## 2020-03-05 PROCEDURE — 36415 COLL VENOUS BLD VENIPUNCTURE: CPT

## 2020-03-05 PROCEDURE — 80053 COMPREHEN METABOLIC PANEL: CPT

## 2020-03-05 PROCEDURE — 99396 PREV VISIT EST AGE 40-64: CPT | Mod: S$GLB,,, | Performed by: INTERNAL MEDICINE

## 2020-03-05 PROCEDURE — 3074F PR MOST RECENT SYSTOLIC BLOOD PRESSURE < 130 MM HG: ICD-10-PCS | Mod: CPTII,S$GLB,, | Performed by: INTERNAL MEDICINE

## 2020-03-05 PROCEDURE — 86803 HEPATITIS C AB TEST: CPT

## 2020-03-05 PROCEDURE — 83036 HEMOGLOBIN GLYCOSYLATED A1C: CPT

## 2020-03-05 PROCEDURE — 84443 ASSAY THYROID STIM HORMONE: CPT

## 2020-03-05 PROCEDURE — 99999 PR PBB SHADOW E&M-EST. PATIENT-LVL V: CPT | Mod: PBBFAC,,, | Performed by: INTERNAL MEDICINE

## 2020-03-05 PROCEDURE — 99396 PR PREVENTIVE VISIT,EST,40-64: ICD-10-PCS | Mod: S$GLB,,, | Performed by: INTERNAL MEDICINE

## 2020-03-05 PROCEDURE — 3078F PR MOST RECENT DIASTOLIC BLOOD PRESSURE < 80 MM HG: ICD-10-PCS | Mod: CPTII,S$GLB,, | Performed by: INTERNAL MEDICINE

## 2020-03-05 PROCEDURE — 3074F SYST BP LT 130 MM HG: CPT | Mod: CPTII,S$GLB,, | Performed by: INTERNAL MEDICINE

## 2020-03-05 PROCEDURE — 85025 COMPLETE CBC W/AUTO DIFF WBC: CPT

## 2020-03-05 PROCEDURE — 3078F DIAST BP <80 MM HG: CPT | Mod: CPTII,S$GLB,, | Performed by: INTERNAL MEDICINE

## 2020-03-05 PROCEDURE — 80061 LIPID PANEL: CPT

## 2020-03-05 PROCEDURE — 82043 UR ALBUMIN QUANTITATIVE: CPT

## 2020-03-05 RX ORDER — ATORVASTATIN CALCIUM 80 MG/1
80 TABLET, FILM COATED ORAL DAILY
Qty: 90 TABLET | Refills: 3 | Status: SHIPPED | OUTPATIENT
Start: 2020-03-05 | End: 2020-03-05 | Stop reason: SDUPTHER

## 2020-03-05 RX ORDER — AMLODIPINE BESYLATE 5 MG/1
5 TABLET ORAL DAILY
Qty: 90 TABLET | Refills: 3 | Status: SHIPPED | OUTPATIENT
Start: 2020-03-05 | End: 2020-03-05 | Stop reason: SDUPTHER

## 2020-03-05 RX ORDER — METFORMIN HYDROCHLORIDE 500 MG/1
500 TABLET, EXTENDED RELEASE ORAL
Qty: 90 TABLET | Refills: 3 | Status: SHIPPED | OUTPATIENT
Start: 2020-03-05 | End: 2020-03-05 | Stop reason: SDUPTHER

## 2020-03-05 RX ORDER — METFORMIN HYDROCHLORIDE 500 MG/1
500 TABLET, EXTENDED RELEASE ORAL
Qty: 90 TABLET | Refills: 3 | Status: SHIPPED | OUTPATIENT
Start: 2020-03-05 | End: 2020-04-27

## 2020-03-05 RX ORDER — AMLODIPINE BESYLATE 5 MG/1
5 TABLET ORAL DAILY
Qty: 90 TABLET | Refills: 3 | Status: SHIPPED | OUTPATIENT
Start: 2020-03-05 | End: 2020-04-27

## 2020-03-05 RX ORDER — SERTRALINE HYDROCHLORIDE 25 MG/1
25 TABLET, FILM COATED ORAL DAILY
Qty: 90 TABLET | Refills: 3 | Status: SHIPPED | OUTPATIENT
Start: 2020-03-05 | End: 2020-03-05 | Stop reason: SDUPTHER

## 2020-03-05 RX ORDER — LISINOPRIL 40 MG/1
40 TABLET ORAL DAILY
Qty: 90 TABLET | Refills: 3 | Status: SHIPPED | OUTPATIENT
Start: 2020-03-05 | End: 2020-03-05 | Stop reason: SDUPTHER

## 2020-03-05 RX ORDER — METOPROLOL SUCCINATE 50 MG/1
50 TABLET, EXTENDED RELEASE ORAL DAILY
Qty: 90 TABLET | Refills: 3 | Status: SHIPPED | OUTPATIENT
Start: 2020-03-05 | End: 2020-03-05 | Stop reason: SDUPTHER

## 2020-03-05 RX ORDER — METOPROLOL SUCCINATE 50 MG/1
50 TABLET, EXTENDED RELEASE ORAL DAILY
Qty: 90 TABLET | Refills: 3 | Status: SHIPPED | OUTPATIENT
Start: 2020-03-05 | End: 2020-04-27

## 2020-03-05 RX ORDER — SERTRALINE HYDROCHLORIDE 25 MG/1
25 TABLET, FILM COATED ORAL DAILY
Qty: 90 TABLET | Refills: 3 | Status: SHIPPED | OUTPATIENT
Start: 2020-03-05 | End: 2021-04-12 | Stop reason: SDUPTHER

## 2020-03-05 RX ORDER — ATORVASTATIN CALCIUM 80 MG/1
80 TABLET, FILM COATED ORAL DAILY
Qty: 90 TABLET | Refills: 3 | Status: SHIPPED | OUTPATIENT
Start: 2020-03-05 | End: 2021-04-12

## 2020-03-05 RX ORDER — LISINOPRIL 40 MG/1
40 TABLET ORAL DAILY
Qty: 90 TABLET | Refills: 3 | Status: SHIPPED | OUTPATIENT
Start: 2020-03-05 | End: 2020-06-04

## 2020-03-05 NOTE — PROGRESS NOTES
INTERNAL MEDICINE ESTABLISHED PATIENT VISIT NOTE    Subjective:     Chief Complaint: Annual Exam       Patient ID: Suni Rodríguez is a 59 y.o. female with HTN, type 2 DM uncomplicated, depression c anxiety, GERD, glaucoma, nontoxic MNG, hx adhesive capsulitis of L shoulder, last seen by me 12/2017, here today for f/u and annual exam.    Reports feeling well overall.  Denies cp or sob.  Some lightheadedness last week but also thinks she probably wasn't drinking enough water.    Reports she has been taking all meds as rx'ed.    Past Medical History:  Past Medical History:   Diagnosis Date    Adhesive capsulitis of left shoulder     Benign essential HTN     Depression with anxiety     GERD without esophagitis     Glaucoma     Mixed hyperlipidemia     Multinodular goiter (nontoxic)     Uncomplicated type 2 diabetes mellitus        Home Medications:  Prior to Admission medications    Medication Sig Start Date End Date Taking? Authorizing Provider   amLODIPine (NORVASC) 5 MG tablet Take 1 tablet (5 mg total) by mouth once daily. 1/30/20  Yes Jade Chin MD   atorvastatin (LIPITOR) 80 MG tablet TAKE 1 TABLET BY MOUTH EVERY DAY 1/13/20  Yes Jade Chin MD   dorzolamide-timolol 2-0.5% (COSOPT) 22.3-6.8 mg/mL ophthalmic solution INSTILL 1 DROP INTO BOTH EYES 2 TIMES DAILY. 3/2/17  Yes Historical Provider, MD   latanoprost 0.005 % ophthalmic solution  4/23/17  Yes Historical Provider, MD   lisinopril (PRINIVIL,ZESTRIL) 40 MG tablet Take 1 tablet (40 mg total) by mouth once daily. 1/30/20  Yes Jade Chin MD   metFORMIN (GLUCOPHAGE-XR) 500 MG 24 hr tablet Take 1 tablet (500 mg total) by mouth daily with breakfast. 1/30/20 1/29/21 Yes Jade Chin MD   metoprolol succinate (TOPROL-XL) 50 MG 24 hr tablet Take 1 tablet (50 mg total) by mouth once daily. 1/30/20  Yes Jade Chin MD   sertraline (ZOLOFT) 25 MG tablet Take 1 tablet (25 mg total) by mouth once daily. 11/27/19  Yes Erika Suarez NP  "      Allergies:  Review of patient's allergies indicates:   Allergen Reactions    Penicillins Hives       Social History:  Social History     Tobacco Use    Smoking status: Former Smoker     Packs/day: 1.00     Years: 20.00     Pack years: 20.00    Tobacco comment: quit smoking in 1996   Substance Use Topics    Alcohol use: Yes     Comment: rare use    Drug use: No        Review of Systems   Constitutional: Negative for appetite change, chills, fatigue, fever and unexpected weight change.   HENT: Negative for congestion, hearing loss and rhinorrhea.    Eyes: Negative for pain and visual disturbance.   Respiratory: Negative for cough, chest tightness, shortness of breath and wheezing.    Cardiovascular: Negative for chest pain, palpitations and leg swelling.   Gastrointestinal: Negative for abdominal distention and abdominal pain.   Endocrine: Negative for polydipsia and polyuria.   Genitourinary: Negative for decreased urine volume, difficulty urinating, dysuria, hematuria and vaginal discharge.   Neurological: Negative for weakness, numbness and headaches.   Psychiatric/Behavioral: Negative for behavioral problems and confusion.         Health Maintenance:     Immunizations:   Influenza rec today  TDap is up to date 1/2016  Pneumovax is up to date. 2013 since DM     Cancer Screening:  PAP: 10/2015 neg for malignancy, neg HPV via Dr. Coelho, will refer to gyn for f/u, wants to est care here.  Mammogram: 12/2017 here c u/s c/w benign cyst, will repeat mmg now.  Colonoscopy: is up to date. 11/2009 c tubular adenoma, repeat csc 3/2016 c severe diverticulosis L colon, rec f/u 5 yrs based on polyp hx per report (Dr. Ruiz)  DEXA:  is up to date. 9/2015 wnl    Objective:   /78   Pulse 80   Ht 5' 4" (1.626 m)   Wt 64.9 kg (143 lb 1.3 oz)   SpO2 99%   BMI 24.56 kg/m²        General: AAO x3, no apparent distress  HEENT: PERRL, OP clear, cerumen B, removed.  CV: RRR, no m/r/g  Pulm: Lungs CTAB, no crackles, no " wheezes  Abd: s/NT/ND +BS  Extremities: no c/c/e  History and physical exam completed.  Health maintenance reviewed as above.      Labs:     Lab Results   Component Value Date    WBC 7.49 12/13/2017    HGB 12.8 12/13/2017    HCT 38.2 12/13/2017    MCV 87 12/13/2017     12/13/2017     Sodium   Date Value Ref Range Status   12/13/2017 143 136 - 145 mmol/L Final     Potassium   Date Value Ref Range Status   12/13/2017 5.1 3.5 - 5.1 mmol/L Final     Chloride   Date Value Ref Range Status   12/13/2017 108 95 - 110 mmol/L Final     CO2   Date Value Ref Range Status   12/13/2017 29 23 - 29 mmol/L Final     Glucose   Date Value Ref Range Status   12/13/2017 115 (H) 70 - 110 mg/dL Final     BUN, Bld   Date Value Ref Range Status   12/13/2017 7 6 - 20 mg/dL Final     Creatinine   Date Value Ref Range Status   12/13/2017 0.8 0.5 - 1.4 mg/dL Final     Calcium   Date Value Ref Range Status   12/13/2017 10.0 8.7 - 10.5 mg/dL Final     Total Protein   Date Value Ref Range Status   12/13/2017 7.4 6.0 - 8.4 g/dL Final     Albumin   Date Value Ref Range Status   12/13/2017 4.1 3.5 - 5.2 g/dL Final     Total Bilirubin   Date Value Ref Range Status   12/13/2017 0.5 0.1 - 1.0 mg/dL Final     Comment:     For infants and newborns, interpretation of results should be based  on gestational age, weight and in agreement with clinical  observations.  Premature Infant recommended reference ranges:  Up to 24 hours.............<8.0 mg/dL  Up to 48 hours............<12.0 mg/dL  3-5 days..................<15.0 mg/dL  6-29 days.................<15.0 mg/dL       Alkaline Phosphatase   Date Value Ref Range Status   12/13/2017 117 55 - 135 U/L Final     AST   Date Value Ref Range Status   12/13/2017 20 10 - 40 U/L Final     ALT   Date Value Ref Range Status   12/13/2017 27 10 - 44 U/L Final     Anion Gap   Date Value Ref Range Status   12/13/2017 6 (L) 8 - 16 mmol/L Final     eGFR if    Date Value Ref Range Status   12/13/2017  >60 >60 mL/min/1.73 m^2 Final     eGFR if non    Date Value Ref Range Status   12/13/2017 >60 >60 mL/min/1.73 m^2 Final     Comment:     Calculation used to obtain the estimated glomerular filtration  rate (eGFR) is the CKD-EPI equation.        Lab Results   Component Value Date    HGBA1C 6.2 (H) 12/13/2017     Lab Results   Component Value Date    LDLCALC 71.2 12/13/2017     Lab Results   Component Value Date    TSH 0.465 12/13/2017         Assessment/Plan     Suni was seen today for annual exam.    Diagnoses and all orders for this visit:    Visit for annual health examination  History and physical exam completed.  Health maintenance reviewed as above.  -     Ambulatory referral/consult to Gynecology; Future    Benign essential HTN  -     Repeat at goal, cont meds.  -     metoprolol succinate (TOPROL-XL) 50 MG 24 hr tablet; Take 1 tablet (50 mg total) by mouth once daily.  -     lisinopril (PRINIVIL,ZESTRIL) 40 MG tablet; Take 1 tablet (40 mg total) by mouth once daily.  -     amLODIPine (NORVASC) 5 MG tablet; Take 1 tablet (5 mg total) by mouth once daily.    Type 2 diabetes mellitus with hyperlipidemia  -       Lab Results   Component Value Date    HGBA1C 6.2 (H) 12/13/2017     Previously well controlled on Metformin  Has had some noncompliance c diet.  Needs updated labs.  Eye exam utd from [facility], records requested today, last seen 2 weeks ago.  Foot exam done today  -     CBC auto differential; Future  -     Comprehensive metabolic panel; Future  -     Hemoglobin A1c; Future  -     Microalbumin/creatinine urine ratio  -     metFORMIN (GLUCOPHAGE-XR) 500 MG XR 24hr tablet; Take 1 tablet (500 mg total) by mouth daily with breakfast.    Mixed hyperlipidemia  -       Lab Results   Component Value Date    LDLCALC 71.2 12/13/2017     Well controlled on lipitor, needs updated labs.  -     Lipid panel; Future  -     atorvastatin (LIPITOR) 80 MG tablet; Take 1 tablet (80 mg total) by mouth once  daily.    Depression with anxiety  -     Stable on meds.  -     sertraline (ZOLOFT) 25 MG tablet; Take 1 tablet (25 mg total) by mouth once daily.    Multinodular goiter (nontoxic)  No recent u/s, previously followed [facility] and had been getting u/s every 3 yrs, will repeat now.  -     TSH; Future  -     US Soft Tissue Head Neck Thyroid; Future    GERD without esophagitis  Stable, no issues.    Glaucoma, unspecified glaucoma type, unspecified laterality  Sees ophtho at [facility], cont f/u    Screening mammogram, encounter for  -     Mammo Digital Screening Bilat w/ [place]; Future    Need for hepatitis C screening test  -     HEPATITIS C ANTIBODY; Future      HM as above  RTC in 4 mos, sooner if needed.  Labs today.    Jade Chin MD  Department of Internal Medicine - [facility]  03/05/2020

## 2020-03-06 LAB — HCV AB SERPL QL IA: NEGATIVE

## 2020-03-10 ENCOUNTER — OFFICE VISIT (OUTPATIENT)
Dept: UROGYNECOLOGY | Facility: CLINIC | Age: 60
End: 2020-03-10
Payer: COMMERCIAL

## 2020-03-10 ENCOUNTER — HOSPITAL ENCOUNTER (OUTPATIENT)
Dept: RADIOLOGY | Facility: HOSPITAL | Age: 60
Discharge: HOME | End: 2020-03-10
Attending: INTERNAL MEDICINE
Payer: COMMERCIAL

## 2020-03-10 VITALS
SYSTOLIC BLOOD PRESSURE: 120 MMHG | BODY MASS INDEX: 24.62 KG/M2 | HEIGHT: 64 IN | DIASTOLIC BLOOD PRESSURE: 70 MMHG | WEIGHT: 144.19 LBS

## 2020-03-10 DIAGNOSIS — Z12.31 SCREENING MAMMOGRAM, ENCOUNTER FOR: ICD-10-CM

## 2020-03-10 DIAGNOSIS — Z12.4 ENCOUNTER FOR SCREENING FOR CERVICAL CANCER: ICD-10-CM

## 2020-03-10 DIAGNOSIS — Z01.419 WELL WOMAN EXAM: Primary | ICD-10-CM

## 2020-03-10 DIAGNOSIS — E04.2 MULTINODULAR GOITER (NONTOXIC): ICD-10-CM

## 2020-03-10 PROCEDURE — 3074F PR MOST RECENT SYSTOLIC BLOOD PRESSURE < 130 MM HG: ICD-10-PCS | Mod: CPTII,S$GLB,, | Performed by: NURSE PRACTITIONER

## 2020-03-10 PROCEDURE — 77067 SCR MAMMO BI INCL CAD: CPT | Mod: 26,,, | Performed by: RADIOLOGY

## 2020-03-10 PROCEDURE — 77063 BREAST TOMOSYNTHESIS BI: CPT | Mod: 26,,, | Performed by: RADIOLOGY

## 2020-03-10 PROCEDURE — 3078F DIAST BP <80 MM HG: CPT | Mod: CPTII,S$GLB,, | Performed by: NURSE PRACTITIONER

## 2020-03-10 PROCEDURE — 76536 US SOFT TISSUE HEAD NECK THYROID: ICD-10-PCS | Mod: 26,,, | Performed by: RADIOLOGY

## 2020-03-10 PROCEDURE — 3078F PR MOST RECENT DIASTOLIC BLOOD PRESSURE < 80 MM HG: ICD-10-PCS | Mod: CPTII,S$GLB,, | Performed by: NURSE PRACTITIONER

## 2020-03-10 PROCEDURE — 3074F SYST BP LT 130 MM HG: CPT | Mod: CPTII,S$GLB,, | Performed by: NURSE PRACTITIONER

## 2020-03-10 PROCEDURE — 88175 CYTOPATH C/V AUTO FLUID REDO: CPT

## 2020-03-10 PROCEDURE — 99999 PR PBB SHADOW E&M-EST. PATIENT-LVL IV: ICD-10-PCS | Mod: PBBFAC,,, | Performed by: NURSE PRACTITIONER

## 2020-03-10 PROCEDURE — 99386 PR PREVENTIVE VISIT,NEW,40-64: ICD-10-PCS | Mod: S$GLB,,, | Performed by: NURSE PRACTITIONER

## 2020-03-10 PROCEDURE — 87624 HPV HI-RISK TYP POOLED RSLT: CPT

## 2020-03-10 PROCEDURE — 76536 US EXAM OF HEAD AND NECK: CPT | Mod: TC

## 2020-03-10 PROCEDURE — 77067 MAMMO DIGITAL SCREENING BILAT WITH TOMOSYNTHESIS_CAD: ICD-10-PCS | Mod: 26,,, | Performed by: RADIOLOGY

## 2020-03-10 PROCEDURE — 99386 PREV VISIT NEW AGE 40-64: CPT | Mod: S$GLB,,, | Performed by: NURSE PRACTITIONER

## 2020-03-10 PROCEDURE — 76536 US EXAM OF HEAD AND NECK: CPT | Mod: 26,,, | Performed by: RADIOLOGY

## 2020-03-10 PROCEDURE — 99999 PR PBB SHADOW E&M-EST. PATIENT-LVL IV: CPT | Mod: PBBFAC,,, | Performed by: NURSE PRACTITIONER

## 2020-03-10 PROCEDURE — 77067 SCR MAMMO BI INCL CAD: CPT | Mod: TC

## 2020-03-10 PROCEDURE — 77063 MAMMO DIGITAL SCREENING BILAT WITH TOMOSYNTHESIS_CAD: ICD-10-PCS | Mod: 26,,, | Performed by: RADIOLOGY

## 2020-03-10 NOTE — LETTER
March 10, 2020      Jade Chin MD  [address]  [facility] [address]           [facility]  [address]  [facility] [address]  Phone: [phone]          Patient: Suni Rodríguez   MR Number: [id number]   YOB: 1960   Date of Visit: 3/10/2020       Dear Dr. Castorena:    Thank you for referring Suni Rodríguez to me for evaluation. Attached you will find relevant portions of my assessment and plan of care.    If you have questions, please do not hesitate to call me. I look forward to following Suni Rodríguez along with you.    Sincerely,    Tiffany Reynaga, RAMÓN    Enclosure  CC:  No Recipients    If you would like to receive this communication electronically, please contact [email or web address] or [phone] to request more information on [facility] Link access.    For providers and/or their staff who would like to refer a patient to [facility], please contact us through our one-stop-shop provider referral line, Adrianna Barton, at [phone].    If you feel you have received this communication in error or would no longer like to receive these types of communications, please e-mail [email or web address]

## 2020-03-10 NOTE — PROGRESS NOTES
03/10/2020    SUBJECTIVE:   59 y.o. female for annual exam.    Past Medical History:   Diagnosis Date    Adhesive capsulitis of left shoulder     Benign essential HTN     Depression with anxiety     GERD without esophagitis     Glaucoma     Mixed hyperlipidemia     Multinodular goiter (nontoxic)     Uncomplicated type 2 diabetes mellitus        History reviewed. No pertinent surgical history.    Family History   Problem Relation Age of Onset    Coronary artery disease Mother         hx MI    Diabetes Mother     Hypertension Mother     Thyroid disease Mother     Hypertension Father     Prostate cancer Father     Glaucoma Father     Asthma Sister     Breast cancer Daughter 40       Social History     Socioeconomic History    Marital status:      Spouse name: Not on file    Number of children: Not on file    Years of education: Not on file    Highest education level: Not on file   Occupational History    Not on file   Social Needs    Financial resource strain: Not on file    Food insecurity:     Worry: Not on file     Inability: Not on file    Transportation needs:     Medical: Not on file     Non-medical: Not on file   Tobacco Use    Smoking status: Former Smoker     Packs/day: 1.00     Years: 20.00     Pack years: 20.00    Smokeless tobacco: Never Used    Tobacco comment: quit smoking in 1996   Substance and Sexual Activity    Alcohol use: Yes     Comment: rare use    Drug use: No    Sexual activity: Not on file   Lifestyle    Physical activity:     Days per week: Not on file     Minutes per session: Not on file    Stress: Not on file   Relationships    Social connections:     Talks on phone: Not on file     Gets together: Not on file     Attends Hoahaoism service: Not on file     Active member of club or organization: Not on file     Attends meetings of clubs or organizations: Not on file     Relationship status: Not on file   Other Topics Concern    Not on file   Social  History Narrative    retired       Current Outpatient Medications   Medication Sig Dispense Refill    amLODIPine (NORVASC) 5 MG tablet Take 1 tablet (5 mg total) by mouth once daily. 90 tablet 3    atorvastatin (LIPITOR) 80 MG tablet Take 1 tablet (80 mg total) by mouth once daily. 90 tablet 3    dorzolamide-timolol 2-0.5% (COSOPT) 22.3-6.8 mg/mL ophthalmic solution INSTILL 1 DROP INTO BOTH EYES 2 TIMES DAILY.  5    latanoprost 0.005 % ophthalmic solution       lisinopril (PRINIVIL,ZESTRIL) 40 MG tablet Take 1 tablet (40 mg total) by mouth once daily. 90 tablet 3    metFORMIN (GLUCOPHAGE-XR) 500 MG XR 24hr tablet Take 1 tablet (500 mg total) by mouth daily with breakfast. 90 tablet 3    metoprolol succinate (TOPROL-XL) 50 MG 24 hr tablet Take 1 tablet (50 mg total) by mouth once daily. 90 tablet 3    sertraline (ZOLOFT) 25 MG tablet Take 1 tablet (25 mg total) by mouth once daily. 90 tablet 3     No current facility-administered medications for this visit.        Review of patient's allergies indicates:   Allergen Reactions    Penicillins Hives       No LMP recorded. Patient is postmenopausal.   LMP approximately             Well Woman:  Pap:? 2 years ago--normal per patient report. Denies history of abnormal pap or std's  Mammo:2017 Left: 2 - Benign  Overall: 2 - Benign   Recommendation:  Bilateral mammogram in 1 year. Next exam due 2018.  Colonoscopy:2016 diverticula-- hx of polyps  Dexa:n/a    OB History        5    Para   5    Term   5            AB        Living           SAB        TAB        Ectopic        Multiple        Live Births                       ROS:  Feeling well.   No dyspnea or chest pain on exertion.    No abdominal pain, change in bowel habits, black or bloody stools.  Intermittent constipation.  No urinary tract symptoms.   GYN ROS: no breast pain or new or enlarging lumps on self exam, no vaginal bleeding.   No neurological  "complaints.    OBJECTIVE:   The patient appears well, alert, oriented x 3, in no distress.  /70 (BP Location: Right arm, Patient Position: Sitting, BP Method: Medium (Manual))   Ht 5' 4" (1.626 m)   Wt 65.4 kg (144 lb 2.9 oz)   BMI 24.75 kg/m²   ENT normal.  Neck supple. No adenopathy or thyromegaly. JESSICA.   Lungs are clear, good air entry, no wheezes, rhonchi or rales.   S1 and S2 normal, no murmurs, regular rate and rhythm.   Abdomen soft without tenderness, guarding, mass or organomegaly.   Extremities show no edema, normal peripheral pulses.   Neurological is normal, no focal findings.    BREAST EXAM: breasts appear normal, no suspicious masses, no skin or nipple changes or axillary nodes    PELVIC EXAM:   VULVA: normal appearing vulva with no masses, tenderness or lesions,   VAGINA: normal appearing vagina with normal color and discharge, no lesions, atrophic,  CERVIX: normal appearing cervix without discharge or lesions,   UTERUS: uterus is normal size, shape, consistency and nontender,   ADNEXA: no masses,   RECTAL: external hemorrhoids non-thrombosed    ASSESSMENT:   1. Well woman exam  Ambulatory referral/consult to Gynecology   2. Encounter for screening for cervical cancer   Liquid-Based Pap Smear, Screening    HPV High Risk Genotypes, PCR       PLAN:   1. Well woman  --pap today  --takes 2-4 weeks for results  --mammogram scheduled    2.RTC 1 year for annual        30 minutes were spent in face to face time with this patient  90 % of this time was spent in counseling and/or coordination of care    Tiffany MANZANO [facility]  Division of Female Pelvic Medicine and Reconstructive Surgery  Department of Obstetrics & Gynecology          "

## 2020-03-10 NOTE — PATIENT INSTRUCTIONS
1. Well woman  --pap today  --takes 2-4 weeks for results  --mammogram scheduled    2.RTC 1 year for annual

## 2020-03-16 LAB
HPV HR 12 DNA SPEC QL NAA+PROBE: NEGATIVE
HPV16 AG SPEC QL: NEGATIVE
HPV18 DNA SPEC QL NAA+PROBE: NEGATIVE

## 2020-04-02 ENCOUNTER — PATIENT MESSAGE (OUTPATIENT)
Dept: UROGYNECOLOGY | Facility: CLINIC | Age: 60
End: 2020-04-02

## 2020-04-02 LAB
FINAL PATHOLOGIC DIAGNOSIS: NORMAL
Lab: NORMAL

## 2020-04-25 DIAGNOSIS — E11.69 TYPE 2 DIABETES MELLITUS WITH HYPERLIPIDEMIA: ICD-10-CM

## 2020-04-25 DIAGNOSIS — I10 BENIGN ESSENTIAL HTN: ICD-10-CM

## 2020-04-25 DIAGNOSIS — E78.5 TYPE 2 DIABETES MELLITUS WITH HYPERLIPIDEMIA: ICD-10-CM

## 2020-04-27 RX ORDER — METOPROLOL SUCCINATE 50 MG/1
TABLET, EXTENDED RELEASE ORAL
Qty: 90 TABLET | Refills: 3 | Status: SHIPPED | OUTPATIENT
Start: 2020-04-27 | End: 2021-04-12 | Stop reason: SDUPTHER

## 2020-04-27 RX ORDER — AMLODIPINE BESYLATE 5 MG/1
TABLET ORAL
Qty: 90 TABLET | Refills: 3 | Status: SHIPPED | OUTPATIENT
Start: 2020-04-27 | End: 2021-04-27

## 2020-04-27 RX ORDER — METFORMIN HYDROCHLORIDE 500 MG/1
TABLET, EXTENDED RELEASE ORAL
Qty: 90 TABLET | Refills: 3 | Status: SHIPPED | OUTPATIENT
Start: 2020-04-27 | End: 2021-04-12 | Stop reason: SDUPTHER

## 2020-06-24 ENCOUNTER — TELEPHONE (OUTPATIENT)
Dept: INTERNAL MEDICINE | Facility: CLINIC | Age: 60
End: 2020-06-24

## 2020-06-24 NOTE — TELEPHONE ENCOUNTER
----- Message from Franklin Paulson sent at 6/24/2020  3:28 PM CDT -----  Contact: self   Patient state she received a message on her phone about getting an antibody test done at [facility]. Patient is asking should she do this test. Please call and advise.

## 2020-06-24 NOTE — TELEPHONE ENCOUNTER
Pt informed covid ab testing is not necessary but if she wants it ordered she can get it. Pt refused and stating not at this time

## 2020-10-22 ENCOUNTER — IMMUNIZATION (OUTPATIENT)
Dept: INTERNAL MEDICINE | Facility: CLINIC | Age: 60
End: 2020-10-22
Payer: COMMERCIAL

## 2020-10-22 ENCOUNTER — LAB VISIT (OUTPATIENT)
Dept: LAB | Facility: HOSPITAL | Age: 60
End: 2020-10-22
Attending: INTERNAL MEDICINE
Payer: COMMERCIAL

## 2020-10-22 ENCOUNTER — TELEPHONE (OUTPATIENT)
Dept: INTERNAL MEDICINE | Facility: CLINIC | Age: 60
End: 2020-10-22

## 2020-10-22 ENCOUNTER — IMMUNIZATION (OUTPATIENT)
Dept: PHARMACY | Facility: CLINIC | Age: 60
End: 2020-10-22
Payer: COMMERCIAL

## 2020-10-22 DIAGNOSIS — E11.69 TYPE 2 DIABETES MELLITUS WITH HYPERLIPIDEMIA: ICD-10-CM

## 2020-10-22 DIAGNOSIS — E78.5 TYPE 2 DIABETES MELLITUS WITH HYPERLIPIDEMIA: ICD-10-CM

## 2020-10-22 DIAGNOSIS — I10 BENIGN ESSENTIAL HTN: Primary | ICD-10-CM

## 2020-10-22 DIAGNOSIS — E04.2 MULTINODULAR GOITER (NONTOXIC): ICD-10-CM

## 2020-10-22 LAB
ALBUMIN SERPL BCP-MCNC: 4.1 G/DL (ref 3.5–5.2)
ALP SERPL-CCNC: 91 U/L (ref 55–135)
ALT SERPL W/O P-5'-P-CCNC: 18 U/L (ref 10–44)
ANION GAP SERPL CALC-SCNC: 5 MMOL/L (ref 8–16)
AST SERPL-CCNC: 19 U/L (ref 10–40)
BILIRUB SERPL-MCNC: 0.4 MG/DL (ref 0.1–1)
BUN SERPL-MCNC: 9 MG/DL (ref 8–23)
CALCIUM SERPL-MCNC: 9.2 MG/DL (ref 8.7–10.5)
CHLORIDE SERPL-SCNC: 108 MMOL/L (ref 95–110)
CO2 SERPL-SCNC: 27 MMOL/L (ref 23–29)
CREAT SERPL-MCNC: 0.7 MG/DL (ref 0.5–1.4)
EST. GFR  (AFRICAN AMERICAN): >60 ML/MIN/1.73 M^2
EST. GFR  (NON AFRICAN AMERICAN): >60 ML/MIN/1.73 M^2
ESTIMATED AVG GLUCOSE: 126 MG/DL (ref 68–131)
GLUCOSE SERPL-MCNC: 61 MG/DL (ref 70–110)
HBA1C MFR BLD HPLC: 6 % (ref 4–5.6)
POTASSIUM SERPL-SCNC: 4 MMOL/L (ref 3.5–5.1)
PROT SERPL-MCNC: 7.3 G/DL (ref 6–8.4)
SODIUM SERPL-SCNC: 140 MMOL/L (ref 136–145)

## 2020-10-22 PROCEDURE — 90686 IIV4 VACC NO PRSV 0.5 ML IM: CPT | Mod: S$GLB,,, | Performed by: INTERNAL MEDICINE

## 2020-10-22 PROCEDURE — 36415 COLL VENOUS BLD VENIPUNCTURE: CPT

## 2020-10-22 PROCEDURE — 90471 IMMUNIZATION ADMIN: CPT | Mod: S$GLB,,, | Performed by: INTERNAL MEDICINE

## 2020-10-22 PROCEDURE — 80053 COMPREHEN METABOLIC PANEL: CPT

## 2020-10-22 PROCEDURE — 90686 FLU VACCINE (QUAD) GREATER THAN OR EQUAL TO 3YO PRESERVATIVE FREE IM: ICD-10-PCS | Mod: S$GLB,,, | Performed by: INTERNAL MEDICINE

## 2020-10-22 PROCEDURE — 83036 HEMOGLOBIN GLYCOSYLATED A1C: CPT

## 2020-10-22 PROCEDURE — 90471 FLU VACCINE (QUAD) GREATER THAN OR EQUAL TO 3YO PRESERVATIVE FREE IM: ICD-10-PCS | Mod: S$GLB,,, | Performed by: INTERNAL MEDICINE

## 2020-11-02 NOTE — PROGRESS NOTES
INTERNAL MEDICINE ESTABLISHED PATIENT VISIT NOTE    Subjective:     Chief Complaint: Follow-up  DM, HTN, lab results     Patient ID: Suni Rodríguez is a 60 y.o. female with HTN, type 2 DM uncomplicated, depression c anxiety, GERD, glaucoma, nontoxic MNG (last u/s 3/2020 c mult nodules c plan to repeat u/s in 3 yrs), hx adhesive capsulitis of L shoulder, last seen by me in March for her annual exam, here today for f/u and to discuss lab results.    Feeling well overall.  Taking all meds as rx'ed.  Denies episodes of hypoglycemia.    Past Medical History:  Past Medical History:   Diagnosis Date    Adhesive capsulitis of left shoulder     Benign essential HTN     Depression with anxiety     GERD without esophagitis     Glaucoma     Mixed hyperlipidemia     Multinodular goiter (nontoxic)     Uncomplicated type 2 diabetes mellitus        Home Medications:  Prior to Admission medications    Medication Sig Start Date End Date Taking? Authorizing Provider   amLODIPine (NORVASC) 5 MG tablet TAKE 1 TABLET BY MOUTH EVERY DAY 4/27/20   Jdae Chin MD   atorvastatin (LIPITOR) 80 MG tablet Take 1 tablet (80 mg total) by mouth once daily. 3/5/20   Jade Chin MD   dorzolamide-timolol 2-0.5% (COSOPT) 22.3-6.8 mg/mL ophthalmic solution INSTILL 1 DROP INTO BOTH EYES 2 TIMES DAILY. 3/2/17   Historical Provider   latanoprost 0.005 % ophthalmic solution  4/23/17   Historical Provider   lisinopriL (PRINIVIL,ZESTRIL) 40 MG tablet TAKE 1 TABLET BY MOUTH EVERY DAY 6/4/20   Jade Chin MD   metFORMIN (GLUCOPHAGE-XR) 500 MG XR 24hr tablet TAKE 1 TABLET BY MOUTH EVERY DAY WITH BREAKFAST 4/27/20   Jade Chin MD   metoprolol succinate (TOPROL-XL) 50 MG 24 hr tablet TAKE 1 TABLET BY MOUTH EVERY DAY 4/27/20   Jade Chin MD   sertraline (ZOLOFT) 25 MG tablet Take 1 tablet (25 mg total) by mouth once daily. 3/5/20   Jade Chin MD       Allergies:  Review of patient's allergies indicates:   Allergen Reactions    Penicillins Hives  "      Social History:  Social History     Tobacco Use    Smoking status: Former Smoker     Packs/day: 1.00     Years: 20.00     Pack years: 20.00    Smokeless tobacco: Never Used    Tobacco comment: quit smoking in 1996   Substance Use Topics    Alcohol use: Yes     Frequency: Monthly or less     Drinks per session: 1 or 2     Binge frequency: Less than monthly     Comment: rare use    Drug use: No        Review of Systems   Constitutional: Negative for activity change and unexpected weight change.   HENT: Negative for hearing loss, rhinorrhea and trouble swallowing.    Eyes: Negative for discharge.   Respiratory: Negative for chest tightness and wheezing.    Cardiovascular: Negative for chest pain and palpitations.   Gastrointestinal: Negative for blood in stool, constipation, diarrhea and vomiting.   Endocrine: Negative for polydipsia and polyuria.   Genitourinary: Negative for difficulty urinating, dysuria, hematuria and menstrual problem.   Musculoskeletal: Negative for arthralgias, joint swelling and neck pain.   Neurological: Negative for weakness and headaches.   Psychiatric/Behavioral: Negative for dysphoric mood.         Health Maintenance:     Immunizations:   Influenza 10/22/2020  TDap is up to date 1/2016  Pneumovax is up to date. 2013 since PA Rivera completed     Cancer Screening:  PAP: 3/2020 c Tiffany Ronnell GÓMEZ c neg HPV  Mammogram: 3/2020 benign  Colonoscopy: is up to date. 11/2009 c tubular adenoma, repeat csc 3/2016 c severe diverticulosis L colon, rec f/u 5 yrs based on polyp hx per report (Dr. Ruiz), ordered today and advised to schedule for March.  DEXA:  is up to date. 9/2015 wnl, will repeat now    Objective:   /86 (BP Location: Left arm, Patient Position: Sitting, BP Method: Medium (Manual))   Pulse (!) 59   Ht 5' 4" (1.626 m)   Wt 66.6 kg (146 lb 12.5 oz)   SpO2 99%   BMI 25.20 kg/m²        General: AAO x3, no apparent distress  HEENT: PERRL, OP clear  CV: RRR, no " m/r/g  Pulm: Lungs CTAB, no crackles, no wheezes  Abd: s/NT/ND +BS  Extremities: no c/c/e  Foot exam completed today.  No decreased sensation with monofilament bilaterally.  No ulcerations noted.  No calluses.  2+ distal pulses bilaterally.      Labs:     Lab Results   Component Value Date    WBC 6.96 03/05/2020    HGB 13.7 03/05/2020    HCT 43.1 03/05/2020    MCV 92 03/05/2020     03/05/2020     Sodium   Date Value Ref Range Status   10/22/2020 140 136 - 145 mmol/L Final     Potassium   Date Value Ref Range Status   10/22/2020 4.0 3.5 - 5.1 mmol/L Final     Chloride   Date Value Ref Range Status   10/22/2020 108 95 - 110 mmol/L Final     CO2   Date Value Ref Range Status   10/22/2020 27 23 - 29 mmol/L Final     Glucose   Date Value Ref Range Status   10/22/2020 61 (L) 70 - 110 mg/dL Final     BUN   Date Value Ref Range Status   10/22/2020 9 8 - 23 mg/dL Final     Creatinine   Date Value Ref Range Status   10/22/2020 0.7 0.5 - 1.4 mg/dL Final     Calcium   Date Value Ref Range Status   10/22/2020 9.2 8.7 - 10.5 mg/dL Final     Total Protein   Date Value Ref Range Status   10/22/2020 7.3 6.0 - 8.4 g/dL Final     Albumin   Date Value Ref Range Status   10/22/2020 4.1 3.5 - 5.2 g/dL Final     Total Bilirubin   Date Value Ref Range Status   10/22/2020 0.4 0.1 - 1.0 mg/dL Final     Comment:     For infants and newborns, interpretation of results should be based  on gestational age, weight and in agreement with clinical  observations.  Premature Infant recommended reference ranges:  Up to 24 hours.............<8.0 mg/dL  Up to 48 hours............<12.0 mg/dL  3-5 days..................<15.0 mg/dL  6-29 days.................<15.0 mg/dL       Alkaline Phosphatase   Date Value Ref Range Status   10/22/2020 91 55 - 135 U/L Final     AST   Date Value Ref Range Status   10/22/2020 19 10 - 40 U/L Final     ALT   Date Value Ref Range Status   10/22/2020 18 10 - 44 U/L Final     Anion Gap   Date Value Ref Range Status    10/22/2020 5 (L) 8 - 16 mmol/L Final     eGFR if    Date Value Ref Range Status   10/22/2020 >60.0 >60 mL/min/1.73 m^2 Final     eGFR if non    Date Value Ref Range Status   10/22/2020 >60.0 >60 mL/min/1.73 m^2 Final     Comment:     Calculation used to obtain the estimated glomerular filtration  rate (eGFR) is the CKD-EPI equation.        Lab Results   Component Value Date    HGBA1C 6.0 (H) 10/22/2020     Lab Results   Component Value Date    LDLCALC 87.6 03/05/2020     Lab Results   Component Value Date    TSH 0.690 03/05/2020         Assessment/Plan     Suni was seen today for follow-up.    Diagnoses and all orders for this visit:    Type 2 diabetes mellitus with hyperlipidemia  Lab Results   Component Value Date    HGBA1C 6.0 (H) 10/22/2020     Improved on recent labs, well controlled on once daily Metformin  Eye exam c Dr. Nikhil smiley, records requested [facility] today.  Foot exam done today  Repeat labs prior to f/u  -     CBC Auto Differential; Future  -     Comprehensive Metabolic Panel; Future  -     Hemoglobin A1C; Future  -     Microalbumin/Creatinine Ratio, Urine; Future    Mixed hyperlipidemia  Lab Results   Component Value Date    LDLCALC 87.6 03/05/2020     Controlled on statin, cont meds.  -     Lipid Panel; Future    Benign essential HTN  Well controlled, no issues    Multinodular goiter (nontoxic)  Last u/s done 3/2020 c plan to repeat u/s 3/2023 since stable from [facility].    Depression with anxiety  Stable on zoloft, no issues    Screen for colon cancer  -     Case request GI: COLONOSCOPY    Post-menopausal  -     DXA Bone Density Spine And Hip; Future      HM as above  RTC in 5 mos c fasting labs prior, sooner if needed.    Jade Chin MD  Department of Internal Medicine - [facility]  11/03/2020

## 2020-11-03 ENCOUNTER — OFFICE VISIT (OUTPATIENT)
Dept: INTERNAL MEDICINE | Facility: CLINIC | Age: 60
End: 2020-11-03
Payer: COMMERCIAL

## 2020-11-03 VITALS
HEIGHT: 64 IN | WEIGHT: 146.81 LBS | SYSTOLIC BLOOD PRESSURE: 122 MMHG | HEART RATE: 59 BPM | DIASTOLIC BLOOD PRESSURE: 86 MMHG | BODY MASS INDEX: 25.06 KG/M2 | OXYGEN SATURATION: 99 %

## 2020-11-03 DIAGNOSIS — E78.5 TYPE 2 DIABETES MELLITUS WITH HYPERLIPIDEMIA: Primary | ICD-10-CM

## 2020-11-03 DIAGNOSIS — E04.2 MULTINODULAR GOITER (NONTOXIC): ICD-10-CM

## 2020-11-03 DIAGNOSIS — E78.2 MIXED HYPERLIPIDEMIA: ICD-10-CM

## 2020-11-03 DIAGNOSIS — Z12.11 SCREEN FOR COLON CANCER: ICD-10-CM

## 2020-11-03 DIAGNOSIS — Z78.0 POST-MENOPAUSAL: ICD-10-CM

## 2020-11-03 DIAGNOSIS — F41.8 DEPRESSION WITH ANXIETY: ICD-10-CM

## 2020-11-03 DIAGNOSIS — I10 BENIGN ESSENTIAL HTN: ICD-10-CM

## 2020-11-03 DIAGNOSIS — E11.69 TYPE 2 DIABETES MELLITUS WITH HYPERLIPIDEMIA: Primary | ICD-10-CM

## 2020-11-03 PROCEDURE — 99214 OFFICE O/P EST MOD 30 MIN: CPT | Mod: S$GLB,,, | Performed by: INTERNAL MEDICINE

## 2020-11-03 PROCEDURE — 3044F PR MOST RECENT HEMOGLOBIN A1C LEVEL <7.0%: ICD-10-PCS | Mod: CPTII,S$GLB,, | Performed by: INTERNAL MEDICINE

## 2020-11-03 PROCEDURE — 3044F HG A1C LEVEL LT 7.0%: CPT | Mod: CPTII,S$GLB,, | Performed by: INTERNAL MEDICINE

## 2020-11-03 PROCEDURE — 99214 PR OFFICE/OUTPT VISIT, EST, LEVL IV, 30-39 MIN: ICD-10-PCS | Mod: S$GLB,,, | Performed by: INTERNAL MEDICINE

## 2020-11-03 PROCEDURE — 3079F DIAST BP 80-89 MM HG: CPT | Mod: CPTII,S$GLB,, | Performed by: INTERNAL MEDICINE

## 2020-11-03 PROCEDURE — 3008F BODY MASS INDEX DOCD: CPT | Mod: CPTII,S$GLB,, | Performed by: INTERNAL MEDICINE

## 2020-11-03 PROCEDURE — 99999 PR PBB SHADOW E&M-EST. PATIENT-LVL IV: CPT | Mod: PBBFAC,,, | Performed by: INTERNAL MEDICINE

## 2020-11-03 PROCEDURE — 99999 PR PBB SHADOW E&M-EST. PATIENT-LVL IV: ICD-10-PCS | Mod: PBBFAC,,, | Performed by: INTERNAL MEDICINE

## 2020-11-03 PROCEDURE — 3074F SYST BP LT 130 MM HG: CPT | Mod: CPTII,S$GLB,, | Performed by: INTERNAL MEDICINE

## 2020-11-03 PROCEDURE — 3079F PR MOST RECENT DIASTOLIC BLOOD PRESSURE 80-89 MM HG: ICD-10-PCS | Mod: CPTII,S$GLB,, | Performed by: INTERNAL MEDICINE

## 2020-11-03 PROCEDURE — 3074F PR MOST RECENT SYSTOLIC BLOOD PRESSURE < 130 MM HG: ICD-10-PCS | Mod: CPTII,S$GLB,, | Performed by: INTERNAL MEDICINE

## 2020-11-03 PROCEDURE — 3008F PR BODY MASS INDEX (BMI) DOCUMENTED: ICD-10-PCS | Mod: CPTII,S$GLB,, | Performed by: INTERNAL MEDICINE

## 2020-11-03 NOTE — PATIENT INSTRUCTIONS
A referral for a colonoscopy has been placed.  Please call [phone] to schedule for anytime after March 2021.

## 2020-11-30 ENCOUNTER — PATIENT OUTREACH (OUTPATIENT)
Dept: ADMINISTRATIVE | Facility: HOSPITAL | Age: 60
End: 2020-11-30

## 2020-11-30 NOTE — PROGRESS NOTES
Health Maintenance Due   Topic Date Due    HIV Screening  08/08/1975     Scanned in and hyperlinked outside diabetic eye exam into .  Chart review completed.

## 2020-12-03 ENCOUNTER — HOSPITAL ENCOUNTER (OUTPATIENT)
Dept: RADIOLOGY | Facility: CLINIC | Age: 60
Discharge: HOME | End: 2020-12-03
Attending: INTERNAL MEDICINE
Payer: COMMERCIAL

## 2020-12-03 DIAGNOSIS — Z78.0 POST-MENOPAUSAL: ICD-10-CM

## 2020-12-03 PROCEDURE — 77080 DXA BONE DENSITY AXIAL: CPT | Mod: 26,,, | Performed by: INTERNAL MEDICINE

## 2020-12-03 PROCEDURE — 77080 DXA BONE DENSITY AXIAL: CPT | Mod: TC

## 2020-12-03 PROCEDURE — 77080 DEXA BONE DENSITY SPINE HIP: ICD-10-PCS | Mod: 26,,, | Performed by: INTERNAL MEDICINE

## 2021-01-22 ENCOUNTER — TELEPHONE (OUTPATIENT)
Dept: INTERNAL MEDICINE | Facility: CLINIC | Age: 61
End: 2021-01-22

## 2021-01-22 DIAGNOSIS — Z12.11 SPECIAL SCREENING FOR MALIGNANT NEOPLASMS, COLON: Primary | ICD-10-CM

## 2021-01-22 DIAGNOSIS — Z01.812 PRE-PROCEDURE LAB EXAM: ICD-10-CM

## 2021-01-22 RX ORDER — POLYETHYLENE GLYCOL 3350, SODIUM SULFATE ANHYDROUS, SODIUM BICARBONATE, SODIUM CHLORIDE, POTASSIUM CHLORIDE 236; 22.74; 6.74; 5.86; 2.97 G/4L; G/4L; G/4L; G/4L; G/4L
4 POWDER, FOR SOLUTION ORAL ONCE
Qty: 4000 ML | Refills: 0 | Status: SHIPPED | OUTPATIENT
Start: 2021-01-22 | End: 2021-01-22

## 2021-02-08 ENCOUNTER — LAB VISIT (OUTPATIENT)
Dept: INTERNAL MEDICINE | Facility: CLINIC | Age: 61
End: 2021-02-08
Payer: COMMERCIAL

## 2021-02-08 DIAGNOSIS — Z01.812 PRE-PROCEDURE LAB EXAM: ICD-10-CM

## 2021-02-08 LAB — SARS-COV-2 RNA RESP QL NAA+PROBE: NOT DETECTED

## 2021-02-08 PROCEDURE — U0005 INFEC AGEN DETEC AMPLI PROBE: HCPCS

## 2021-02-08 PROCEDURE — U0003 INFECTIOUS AGENT DETECTION BY NUCLEIC ACID (DNA OR RNA); SEVERE ACUTE RESPIRATORY SYNDROME CORONAVIRUS 2 (SARS-COV-2) (CORONAVIRUS DISEASE [COVID-19]), AMPLIFIED PROBE TECHNIQUE, MAKING USE OF HIGH THROUGHPUT TECHNOLOGIES AS DESCRIBED BY CMS-2020-01-R: HCPCS

## 2021-02-09 DIAGNOSIS — Z12.11 SPECIAL SCREENING FOR MALIGNANT NEOPLASMS, COLON: Primary | ICD-10-CM

## 2021-02-09 RX ORDER — POLYETHYLENE GLYCOL 3350, SODIUM SULFATE ANHYDROUS, SODIUM BICARBONATE, SODIUM CHLORIDE, POTASSIUM CHLORIDE 236; 22.74; 6.74; 5.86; 2.97 G/4L; G/4L; G/4L; G/4L; G/4L
4 POWDER, FOR SOLUTION ORAL ONCE
Qty: 4000 ML | Refills: 0 | Status: SHIPPED | OUTPATIENT
Start: 2021-02-09 | End: 2021-02-09

## 2021-02-11 ENCOUNTER — HOSPITAL ENCOUNTER (OUTPATIENT)
Facility: HOSPITAL | Age: 61
Discharge: HOME | End: 2021-02-11
Attending: COLON & RECTAL SURGERY | Admitting: COLON & RECTAL SURGERY
Payer: COMMERCIAL

## 2021-02-11 ENCOUNTER — ANESTHESIA EVENT (OUTPATIENT)
Dept: ENDOSCOPY | Facility: HOSPITAL | Age: 61
End: 2021-02-11
Payer: COMMERCIAL

## 2021-02-11 ENCOUNTER — ANESTHESIA (OUTPATIENT)
Dept: ENDOSCOPY | Facility: HOSPITAL | Age: 61
End: 2021-02-11
Payer: COMMERCIAL

## 2021-02-11 VITALS
HEIGHT: 65 IN | TEMPERATURE: 98 F | RESPIRATION RATE: 17 BRPM | HEART RATE: 69 BPM | SYSTOLIC BLOOD PRESSURE: 136 MMHG | DIASTOLIC BLOOD PRESSURE: 62 MMHG | OXYGEN SATURATION: 100 % | BODY MASS INDEX: 24.16 KG/M2 | WEIGHT: 145 LBS

## 2021-02-11 DIAGNOSIS — Z12.11 SCREENING FOR MALIGNANT NEOPLASM OF COLON: Primary | ICD-10-CM

## 2021-02-11 LAB — POCT GLUCOSE: 96 MG/DL (ref 70–110)

## 2021-02-11 PROCEDURE — 88305 TISSUE EXAM BY PATHOLOGIST: CPT | Performed by: STUDENT IN AN ORGANIZED HEALTH CARE EDUCATION/TRAINING PROGRAM

## 2021-02-11 PROCEDURE — 25000003 PHARM REV CODE 250: Performed by: COLON & RECTAL SURGERY

## 2021-02-11 PROCEDURE — 27201012 HC FORCEPS, HOT/COLD, DISP: Performed by: COLON & RECTAL SURGERY

## 2021-02-11 PROCEDURE — 37000008 HC ANESTHESIA 1ST 15 MINUTES: Performed by: COLON & RECTAL SURGERY

## 2021-02-11 PROCEDURE — E9220 PRA ENDO ANESTHESIA: ICD-10-PCS | Mod: 33,,, | Performed by: NURSE ANESTHETIST, CERTIFIED REGISTERED

## 2021-02-11 PROCEDURE — 37000009 HC ANESTHESIA EA ADD 15 MINS: Performed by: COLON & RECTAL SURGERY

## 2021-02-11 PROCEDURE — 63600175 PHARM REV CODE 636 W HCPCS: Performed by: NURSE ANESTHETIST, CERTIFIED REGISTERED

## 2021-02-11 PROCEDURE — 88305 TISSUE EXAM BY PATHOLOGIST: ICD-10-PCS | Mod: 26,,, | Performed by: STUDENT IN AN ORGANIZED HEALTH CARE EDUCATION/TRAINING PROGRAM

## 2021-02-11 PROCEDURE — 25000003 PHARM REV CODE 250: Performed by: NURSE ANESTHETIST, CERTIFIED REGISTERED

## 2021-02-11 PROCEDURE — 45380 COLONOSCOPY AND BIOPSY: CPT | Performed by: COLON & RECTAL SURGERY

## 2021-02-11 PROCEDURE — 45380 PR COLONOSCOPY,BIOPSY: ICD-10-PCS | Mod: 33,,, | Performed by: COLON & RECTAL SURGERY

## 2021-02-11 PROCEDURE — E9220 PRA ENDO ANESTHESIA: HCPCS | Mod: 33,,, | Performed by: NURSE ANESTHETIST, CERTIFIED REGISTERED

## 2021-02-11 PROCEDURE — 88305 TISSUE EXAM BY PATHOLOGIST: CPT | Mod: 26,,, | Performed by: STUDENT IN AN ORGANIZED HEALTH CARE EDUCATION/TRAINING PROGRAM

## 2021-02-11 PROCEDURE — 45380 COLONOSCOPY AND BIOPSY: CPT | Mod: 33,,, | Performed by: COLON & RECTAL SURGERY

## 2021-02-11 RX ORDER — LIDOCAINE HYDROCHLORIDE 20 MG/ML
INJECTION INTRAVENOUS
Status: DISCONTINUED | OUTPATIENT
Start: 2021-02-11 | End: 2021-02-11

## 2021-02-11 RX ORDER — MULTIVITAMIN
1 TABLET ORAL DAILY
COMMUNITY

## 2021-02-11 RX ORDER — PROPOFOL 10 MG/ML
VIAL (ML) INTRAVENOUS CONTINUOUS PRN
Status: DISCONTINUED | OUTPATIENT
Start: 2021-02-11 | End: 2021-02-11

## 2021-02-11 RX ORDER — PROPOFOL 10 MG/ML
VIAL (ML) INTRAVENOUS
Status: DISCONTINUED | OUTPATIENT
Start: 2021-02-11 | End: 2021-02-11

## 2021-02-11 RX ORDER — SODIUM CHLORIDE 9 MG/ML
INJECTION, SOLUTION INTRAVENOUS CONTINUOUS
Status: DISCONTINUED | OUTPATIENT
Start: 2021-02-11 | End: 2021-02-11 | Stop reason: HOSPADM

## 2021-02-11 RX ADMIN — LIDOCAINE HYDROCHLORIDE 50 MG: 20 INJECTION, SOLUTION INTRAVENOUS at 10:02

## 2021-02-11 RX ADMIN — PROPOFOL 100 MG: 10 INJECTION, EMULSION INTRAVENOUS at 10:02

## 2021-02-11 RX ADMIN — SODIUM CHLORIDE: 0.9 INJECTION, SOLUTION INTRAVENOUS at 09:02

## 2021-02-11 RX ADMIN — PROPOFOL 150 MCG/KG/MIN: 10 INJECTION, EMULSION INTRAVENOUS at 10:02

## 2021-02-18 LAB
FINAL PATHOLOGIC DIAGNOSIS: NORMAL
GROSS: NORMAL
Lab: NORMAL

## 2021-03-17 DIAGNOSIS — Z12.31 OTHER SCREENING MAMMOGRAM: ICD-10-CM

## 2021-04-05 ENCOUNTER — PATIENT MESSAGE (OUTPATIENT)
Dept: ADMINISTRATIVE | Facility: HOSPITAL | Age: 61
End: 2021-04-05

## 2021-04-08 ENCOUNTER — LAB VISIT (OUTPATIENT)
Dept: LAB | Facility: HOSPITAL | Age: 61
End: 2021-04-08
Attending: INTERNAL MEDICINE
Payer: COMMERCIAL

## 2021-04-08 DIAGNOSIS — E78.5 TYPE 2 DIABETES MELLITUS WITH HYPERLIPIDEMIA: ICD-10-CM

## 2021-04-08 DIAGNOSIS — E11.69 TYPE 2 DIABETES MELLITUS WITH HYPERLIPIDEMIA: ICD-10-CM

## 2021-04-08 DIAGNOSIS — E78.2 MIXED HYPERLIPIDEMIA: ICD-10-CM

## 2021-04-08 LAB
ALBUMIN SERPL BCP-MCNC: 4.3 G/DL (ref 3.5–5.2)
ALP SERPL-CCNC: 112 U/L (ref 55–135)
ALT SERPL W/O P-5'-P-CCNC: 29 U/L (ref 10–44)
ANION GAP SERPL CALC-SCNC: 7 MMOL/L (ref 8–16)
AST SERPL-CCNC: 25 U/L (ref 10–40)
BASOPHILS # BLD AUTO: 0.03 K/UL (ref 0–0.2)
BASOPHILS NFR BLD: 0.4 % (ref 0–1.9)
BILIRUB SERPL-MCNC: 0.5 MG/DL (ref 0.1–1)
BUN SERPL-MCNC: 11 MG/DL (ref 8–23)
CALCIUM SERPL-MCNC: 9.8 MG/DL (ref 8.7–10.5)
CHLORIDE SERPL-SCNC: 107 MMOL/L (ref 95–110)
CHOLEST SERPL-MCNC: 136 MG/DL (ref 120–199)
CHOLEST/HDLC SERPL: 3.5 {RATIO} (ref 2–5)
CO2 SERPL-SCNC: 27 MMOL/L (ref 23–29)
CREAT SERPL-MCNC: 0.8 MG/DL (ref 0.5–1.4)
DIFFERENTIAL METHOD: NORMAL
EOSINOPHIL # BLD AUTO: 0.1 K/UL (ref 0–0.5)
EOSINOPHIL NFR BLD: 1 % (ref 0–8)
ERYTHROCYTE [DISTWIDTH] IN BLOOD BY AUTOMATED COUNT: 12.8 % (ref 11.5–14.5)
EST. GFR  (AFRICAN AMERICAN): >60 ML/MIN/1.73 M^2
EST. GFR  (NON AFRICAN AMERICAN): >60 ML/MIN/1.73 M^2
GLUCOSE SERPL-MCNC: 112 MG/DL (ref 70–110)
HCT VFR BLD AUTO: 42.6 % (ref 37–48.5)
HDLC SERPL-MCNC: 39 MG/DL (ref 40–75)
HDLC SERPL: 28.7 % (ref 20–50)
HGB BLD-MCNC: 13.7 G/DL (ref 12–16)
IMM GRANULOCYTES # BLD AUTO: 0.01 K/UL (ref 0–0.04)
IMM GRANULOCYTES NFR BLD AUTO: 0.1 % (ref 0–0.5)
LDLC SERPL CALC-MCNC: 76.8 MG/DL (ref 63–159)
LYMPHOCYTES # BLD AUTO: 2.4 K/UL (ref 1–4.8)
LYMPHOCYTES NFR BLD: 29.4 % (ref 18–48)
MCH RBC QN AUTO: 28.7 PG (ref 27–31)
MCHC RBC AUTO-ENTMCNC: 32.2 G/DL (ref 32–36)
MCV RBC AUTO: 89 FL (ref 82–98)
MONOCYTES # BLD AUTO: 0.5 K/UL (ref 0.3–1)
MONOCYTES NFR BLD: 6.5 % (ref 4–15)
NEUTROPHILS # BLD AUTO: 5.1 K/UL (ref 1.8–7.7)
NEUTROPHILS NFR BLD: 62.6 % (ref 38–73)
NONHDLC SERPL-MCNC: 97 MG/DL
NRBC BLD-RTO: 0 /100 WBC
PLATELET # BLD AUTO: 273 K/UL (ref 150–450)
PMV BLD AUTO: 12.7 FL (ref 9.2–12.9)
POTASSIUM SERPL-SCNC: 4.9 MMOL/L (ref 3.5–5.1)
PROT SERPL-MCNC: 8 G/DL (ref 6–8.4)
RBC # BLD AUTO: 4.77 M/UL (ref 4–5.4)
SODIUM SERPL-SCNC: 141 MMOL/L (ref 136–145)
TRIGL SERPL-MCNC: 101 MG/DL (ref 30–150)
WBC # BLD AUTO: 8.16 K/UL (ref 3.9–12.7)

## 2021-04-08 PROCEDURE — 80053 COMPREHEN METABOLIC PANEL: CPT | Performed by: INTERNAL MEDICINE

## 2021-04-08 PROCEDURE — 36415 COLL VENOUS BLD VENIPUNCTURE: CPT | Performed by: INTERNAL MEDICINE

## 2021-04-08 PROCEDURE — 83036 HEMOGLOBIN GLYCOSYLATED A1C: CPT | Performed by: INTERNAL MEDICINE

## 2021-04-08 PROCEDURE — 80061 LIPID PANEL: CPT | Performed by: INTERNAL MEDICINE

## 2021-04-08 PROCEDURE — 85025 COMPLETE CBC W/AUTO DIFF WBC: CPT | Performed by: INTERNAL MEDICINE

## 2021-04-09 LAB
ESTIMATED AVG GLUCOSE: 134 MG/DL (ref 68–131)
HBA1C MFR BLD: 6.3 % (ref 4–5.6)

## 2021-04-12 ENCOUNTER — OFFICE VISIT (OUTPATIENT)
Dept: INTERNAL MEDICINE | Facility: CLINIC | Age: 61
End: 2021-04-12
Payer: COMMERCIAL

## 2021-04-12 VITALS
BODY MASS INDEX: 25.11 KG/M2 | OXYGEN SATURATION: 99 % | SYSTOLIC BLOOD PRESSURE: 120 MMHG | WEIGHT: 147.06 LBS | DIASTOLIC BLOOD PRESSURE: 80 MMHG | HEIGHT: 64 IN | HEART RATE: 80 BPM

## 2021-04-12 DIAGNOSIS — F41.8 DEPRESSION WITH ANXIETY: ICD-10-CM

## 2021-04-12 DIAGNOSIS — Z00.00 VISIT FOR ANNUAL HEALTH EXAMINATION: Primary | ICD-10-CM

## 2021-04-12 DIAGNOSIS — E78.5 TYPE 2 DIABETES MELLITUS WITH HYPERLIPIDEMIA: ICD-10-CM

## 2021-04-12 DIAGNOSIS — H40.9 GLAUCOMA, UNSPECIFIED GLAUCOMA TYPE, UNSPECIFIED LATERALITY: ICD-10-CM

## 2021-04-12 DIAGNOSIS — E11.69 TYPE 2 DIABETES MELLITUS WITH HYPERLIPIDEMIA: ICD-10-CM

## 2021-04-12 DIAGNOSIS — K21.9 GERD WITHOUT ESOPHAGITIS: ICD-10-CM

## 2021-04-12 DIAGNOSIS — M85.852 OSTEOPENIA OF NECK OF LEFT FEMUR: ICD-10-CM

## 2021-04-12 DIAGNOSIS — E04.2 MULTINODULAR GOITER (NONTOXIC): ICD-10-CM

## 2021-04-12 DIAGNOSIS — E78.2 MIXED HYPERLIPIDEMIA: ICD-10-CM

## 2021-04-12 DIAGNOSIS — I10 BENIGN ESSENTIAL HTN: ICD-10-CM

## 2021-04-12 PROCEDURE — 99999 PR PBB SHADOW E&M-EST. PATIENT-LVL IV: CPT | Mod: PBBFAC,,, | Performed by: INTERNAL MEDICINE

## 2021-04-12 PROCEDURE — 1126F AMNT PAIN NOTED NONE PRSNT: CPT | Mod: S$GLB,,, | Performed by: INTERNAL MEDICINE

## 2021-04-12 PROCEDURE — 3008F PR BODY MASS INDEX (BMI) DOCUMENTED: ICD-10-PCS | Mod: CPTII,S$GLB,, | Performed by: INTERNAL MEDICINE

## 2021-04-12 PROCEDURE — 1126F PR PAIN SEVERITY QUANTIFIED, NO PAIN PRESENT: ICD-10-PCS | Mod: S$GLB,,, | Performed by: INTERNAL MEDICINE

## 2021-04-12 PROCEDURE — 99396 PR PREVENTIVE VISIT,EST,40-64: ICD-10-PCS | Mod: S$GLB,,, | Performed by: INTERNAL MEDICINE

## 2021-04-12 PROCEDURE — 99396 PREV VISIT EST AGE 40-64: CPT | Mod: S$GLB,,, | Performed by: INTERNAL MEDICINE

## 2021-04-12 PROCEDURE — 99999 PR PBB SHADOW E&M-EST. PATIENT-LVL IV: ICD-10-PCS | Mod: PBBFAC,,, | Performed by: INTERNAL MEDICINE

## 2021-04-12 PROCEDURE — 3008F BODY MASS INDEX DOCD: CPT | Mod: CPTII,S$GLB,, | Performed by: INTERNAL MEDICINE

## 2021-04-12 RX ORDER — METOPROLOL SUCCINATE 50 MG/1
50 TABLET, EXTENDED RELEASE ORAL DAILY
Qty: 90 TABLET | Refills: 3 | Status: SHIPPED | OUTPATIENT
Start: 2021-04-12 | End: 2021-11-15 | Stop reason: SDUPTHER

## 2021-04-12 RX ORDER — LISINOPRIL 40 MG/1
40 TABLET ORAL DAILY
Qty: 90 TABLET | Refills: 3 | Status: SHIPPED | OUTPATIENT
Start: 2021-04-12 | End: 2021-11-15 | Stop reason: SDUPTHER

## 2021-04-12 RX ORDER — METFORMIN HYDROCHLORIDE 500 MG/1
500 TABLET, EXTENDED RELEASE ORAL DAILY
Qty: 90 TABLET | Refills: 3 | Status: SHIPPED | OUTPATIENT
Start: 2021-04-12 | End: 2021-11-15 | Stop reason: SDUPTHER

## 2021-04-12 RX ORDER — SERTRALINE HYDROCHLORIDE 25 MG/1
25 TABLET, FILM COATED ORAL DAILY
Qty: 90 TABLET | Refills: 3 | Status: SHIPPED | OUTPATIENT
Start: 2021-04-12 | End: 2021-11-15 | Stop reason: SDUPTHER

## 2021-04-22 ENCOUNTER — HOSPITAL ENCOUNTER (OUTPATIENT)
Dept: RADIOLOGY | Facility: HOSPITAL | Age: 61
Discharge: HOME | End: 2021-04-22
Attending: INTERNAL MEDICINE
Payer: COMMERCIAL

## 2021-04-22 DIAGNOSIS — E04.2 MULTINODULAR GOITER (NONTOXIC): ICD-10-CM

## 2021-04-22 PROCEDURE — 76536 US SOFT TISSUE HEAD NECK THYROID: ICD-10-PCS | Mod: 26,,, | Performed by: RADIOLOGY

## 2021-04-22 PROCEDURE — 76536 US EXAM OF HEAD AND NECK: CPT | Mod: 26,,, | Performed by: RADIOLOGY

## 2021-04-22 PROCEDURE — 76536 US EXAM OF HEAD AND NECK: CPT | Mod: TC

## 2021-05-20 ENCOUNTER — HOSPITAL ENCOUNTER (OUTPATIENT)
Dept: RADIOLOGY | Facility: HOSPITAL | Age: 61
Discharge: HOME | End: 2021-05-20
Attending: INTERNAL MEDICINE
Payer: COMMERCIAL

## 2021-05-20 DIAGNOSIS — Z12.31 OTHER SCREENING MAMMOGRAM: ICD-10-CM

## 2021-05-20 PROCEDURE — 77063 BREAST TOMOSYNTHESIS BI: CPT | Mod: 26,,, | Performed by: RADIOLOGY

## 2021-05-20 PROCEDURE — 77067 SCR MAMMO BI INCL CAD: CPT | Mod: 26,,, | Performed by: RADIOLOGY

## 2021-05-20 PROCEDURE — 77067 MAMMO DIGITAL SCREENING BILAT WITH TOMO: ICD-10-PCS | Mod: 26,,, | Performed by: RADIOLOGY

## 2021-05-20 PROCEDURE — 77063 MAMMO DIGITAL SCREENING BILAT WITH TOMO: ICD-10-PCS | Mod: 26,,, | Performed by: RADIOLOGY

## 2021-05-20 PROCEDURE — 77067 SCR MAMMO BI INCL CAD: CPT | Mod: TC,PN

## 2021-10-18 ENCOUNTER — OFFICE VISIT (OUTPATIENT)
Dept: OBSTETRICS AND GYNECOLOGY | Facility: CLINIC | Age: 61
End: 2021-10-18
Payer: COMMERCIAL

## 2021-10-18 VITALS — WEIGHT: 148.5 LBS | DIASTOLIC BLOOD PRESSURE: 90 MMHG | BODY MASS INDEX: 25.49 KG/M2 | SYSTOLIC BLOOD PRESSURE: 140 MMHG

## 2021-10-18 DIAGNOSIS — Z01.419 ENCOUNTER FOR ANNUAL ROUTINE GYNECOLOGICAL EXAMINATION: Primary | ICD-10-CM

## 2021-10-18 DIAGNOSIS — Z12.4 SCREENING FOR MALIGNANT NEOPLASM OF CERVIX: ICD-10-CM

## 2021-10-18 PROCEDURE — 4010F PR ACE/ARB THEARPY RXD/TAKEN: ICD-10-PCS | Mod: CPTII,S$GLB,, | Performed by: NURSE PRACTITIONER

## 2021-10-18 PROCEDURE — 87624 HPV HI-RISK TYP POOLED RSLT: CPT | Performed by: NURSE PRACTITIONER

## 2021-10-18 PROCEDURE — 88175 CYTOPATH C/V AUTO FLUID REDO: CPT | Performed by: NURSE PRACTITIONER

## 2021-10-18 PROCEDURE — 3061F PR NEG MICROALBUMINURIA RESULT DOCUMENTED/REVIEW: ICD-10-PCS | Mod: CPTII,S$GLB,, | Performed by: NURSE PRACTITIONER

## 2021-10-18 PROCEDURE — 3080F DIAST BP >= 90 MM HG: CPT | Mod: CPTII,S$GLB,, | Performed by: NURSE PRACTITIONER

## 2021-10-18 PROCEDURE — 3080F PR MOST RECENT DIASTOLIC BLOOD PRESSURE >= 90 MM HG: ICD-10-PCS | Mod: CPTII,S$GLB,, | Performed by: NURSE PRACTITIONER

## 2021-10-18 PROCEDURE — 99999 PR PBB SHADOW E&M-EST. PATIENT-LVL III: ICD-10-PCS | Mod: PBBFAC,,, | Performed by: NURSE PRACTITIONER

## 2021-10-18 PROCEDURE — 3044F HG A1C LEVEL LT 7.0%: CPT | Mod: CPTII,S$GLB,, | Performed by: NURSE PRACTITIONER

## 2021-10-18 PROCEDURE — 4010F ACE/ARB THERAPY RXD/TAKEN: CPT | Mod: CPTII,S$GLB,, | Performed by: NURSE PRACTITIONER

## 2021-10-18 PROCEDURE — 1160F RVW MEDS BY RX/DR IN RCRD: CPT | Mod: CPTII,S$GLB,, | Performed by: NURSE PRACTITIONER

## 2021-10-18 PROCEDURE — 99999 PR PBB SHADOW E&M-EST. PATIENT-LVL III: CPT | Mod: PBBFAC,,, | Performed by: NURSE PRACTITIONER

## 2021-10-18 PROCEDURE — 99386 PREV VISIT NEW AGE 40-64: CPT | Mod: S$GLB,,, | Performed by: NURSE PRACTITIONER

## 2021-10-18 PROCEDURE — 3077F SYST BP >= 140 MM HG: CPT | Mod: CPTII,S$GLB,, | Performed by: NURSE PRACTITIONER

## 2021-10-18 PROCEDURE — 3066F PR DOCUMENTATION OF TREATMENT FOR NEPHROPATHY: ICD-10-PCS | Mod: CPTII,S$GLB,, | Performed by: NURSE PRACTITIONER

## 2021-10-18 PROCEDURE — 3077F PR MOST RECENT SYSTOLIC BLOOD PRESSURE >= 140 MM HG: ICD-10-PCS | Mod: CPTII,S$GLB,, | Performed by: NURSE PRACTITIONER

## 2021-10-18 PROCEDURE — 99386 PR PREVENTIVE VISIT,NEW,40-64: ICD-10-PCS | Mod: S$GLB,,, | Performed by: NURSE PRACTITIONER

## 2021-10-18 PROCEDURE — 3061F NEG MICROALBUMINURIA REV: CPT | Mod: CPTII,S$GLB,, | Performed by: NURSE PRACTITIONER

## 2021-10-18 PROCEDURE — 3008F PR BODY MASS INDEX (BMI) DOCUMENTED: ICD-10-PCS | Mod: CPTII,S$GLB,, | Performed by: NURSE PRACTITIONER

## 2021-10-18 PROCEDURE — 3066F NEPHROPATHY DOC TX: CPT | Mod: CPTII,S$GLB,, | Performed by: NURSE PRACTITIONER

## 2021-10-18 PROCEDURE — 3044F PR MOST RECENT HEMOGLOBIN A1C LEVEL <7.0%: ICD-10-PCS | Mod: CPTII,S$GLB,, | Performed by: NURSE PRACTITIONER

## 2021-10-18 PROCEDURE — 1159F MED LIST DOCD IN RCRD: CPT | Mod: CPTII,S$GLB,, | Performed by: NURSE PRACTITIONER

## 2021-10-18 PROCEDURE — 1159F PR MEDICATION LIST DOCUMENTED IN MEDICAL RECORD: ICD-10-PCS | Mod: CPTII,S$GLB,, | Performed by: NURSE PRACTITIONER

## 2021-10-18 PROCEDURE — 3008F BODY MASS INDEX DOCD: CPT | Mod: CPTII,S$GLB,, | Performed by: NURSE PRACTITIONER

## 2021-10-18 PROCEDURE — 1160F PR REVIEW ALL MEDS BY PRESCRIBER/CLIN PHARMACIST DOCUMENTED: ICD-10-PCS | Mod: CPTII,S$GLB,, | Performed by: NURSE PRACTITIONER

## 2021-10-20 DIAGNOSIS — E11.9 TYPE 2 DIABETES MELLITUS WITHOUT COMPLICATION: ICD-10-CM

## 2021-10-21 ENCOUNTER — LAB VISIT (OUTPATIENT)
Dept: LAB | Facility: HOSPITAL | Age: 61
End: 2021-10-21
Attending: INTERNAL MEDICINE
Payer: COMMERCIAL

## 2021-10-21 ENCOUNTER — IMMUNIZATION (OUTPATIENT)
Dept: INTERNAL MEDICINE | Facility: CLINIC | Age: 61
End: 2021-10-21
Payer: COMMERCIAL

## 2021-10-21 ENCOUNTER — OFFICE VISIT (OUTPATIENT)
Dept: INTERNAL MEDICINE | Facility: CLINIC | Age: 61
End: 2021-10-21
Payer: COMMERCIAL

## 2021-10-21 VITALS
HEART RATE: 79 BPM | DIASTOLIC BLOOD PRESSURE: 84 MMHG | HEIGHT: 64 IN | OXYGEN SATURATION: 98 % | WEIGHT: 147.25 LBS | BODY MASS INDEX: 25.14 KG/M2 | SYSTOLIC BLOOD PRESSURE: 130 MMHG

## 2021-10-21 DIAGNOSIS — M85.852 OSTEOPENIA OF NECK OF LEFT FEMUR: ICD-10-CM

## 2021-10-21 DIAGNOSIS — E04.2 MULTINODULAR GOITER (NONTOXIC): ICD-10-CM

## 2021-10-21 DIAGNOSIS — K21.9 GERD WITHOUT ESOPHAGITIS: ICD-10-CM

## 2021-10-21 DIAGNOSIS — E78.2 MIXED HYPERLIPIDEMIA: ICD-10-CM

## 2021-10-21 DIAGNOSIS — F41.8 DEPRESSION WITH ANXIETY: ICD-10-CM

## 2021-10-21 DIAGNOSIS — E11.69 TYPE 2 DIABETES MELLITUS WITH HYPERLIPIDEMIA: Primary | ICD-10-CM

## 2021-10-21 DIAGNOSIS — E78.5 TYPE 2 DIABETES MELLITUS WITH HYPERLIPIDEMIA: ICD-10-CM

## 2021-10-21 DIAGNOSIS — E11.69 TYPE 2 DIABETES MELLITUS WITH HYPERLIPIDEMIA: ICD-10-CM

## 2021-10-21 DIAGNOSIS — E78.5 TYPE 2 DIABETES MELLITUS WITH HYPERLIPIDEMIA: Primary | ICD-10-CM

## 2021-10-21 DIAGNOSIS — I10 BENIGN ESSENTIAL HTN: ICD-10-CM

## 2021-10-21 LAB
ALBUMIN SERPL BCP-MCNC: 4 G/DL (ref 3.5–5.2)
ALP SERPL-CCNC: 100 U/L (ref 55–135)
ALT SERPL W/O P-5'-P-CCNC: 20 U/L (ref 10–44)
ANION GAP SERPL CALC-SCNC: 8 MMOL/L (ref 8–16)
AST SERPL-CCNC: 20 U/L (ref 10–40)
BILIRUB SERPL-MCNC: 0.8 MG/DL (ref 0.1–1)
BUN SERPL-MCNC: 9 MG/DL (ref 8–23)
CALCIUM SERPL-MCNC: 9.4 MG/DL (ref 8.7–10.5)
CHLORIDE SERPL-SCNC: 106 MMOL/L (ref 95–110)
CHOLEST SERPL-MCNC: 118 MG/DL (ref 120–199)
CHOLEST/HDLC SERPL: 3.5 {RATIO} (ref 2–5)
CLINICAL INFO: NORMAL
CO2 SERPL-SCNC: 26 MMOL/L (ref 23–29)
CREAT SERPL-MCNC: 0.8 MG/DL (ref 0.5–1.4)
CYTO CVX: NORMAL
CYTOLOGIST CVX/VAG CYTO: NORMAL
CYTOLOGIST CVX/VAG CYTO: NORMAL
CYTOLOGY CMNT CVX/VAG CYTO-IMP: NORMAL
CYTOLOGY PAP THIN PREP EXPLANATION: NORMAL
DATE OF PREVIOUS PAP: NORMAL
DATE PREVIOUS BX: NO
EST. GFR  (AFRICAN AMERICAN): >60 ML/MIN/1.73 M^2
EST. GFR  (NON AFRICAN AMERICAN): >60 ML/MIN/1.73 M^2
ESTIMATED AVG GLUCOSE: 134 MG/DL (ref 68–131)
GEN CATEG CVX/VAG CYTO-IMP: NORMAL
GLUCOSE SERPL-MCNC: 111 MG/DL (ref 70–110)
HBA1C MFR BLD: 6.3 % (ref 4–5.6)
HDLC SERPL-MCNC: 34 MG/DL (ref 40–75)
HDLC SERPL: 28.8 % (ref 20–50)
HPV I/H RISK 4 DNA CVX QL NAA+PROBE: NOT DETECTED
LDLC SERPL CALC-MCNC: 65.6 MG/DL (ref 63–159)
LMP START DATE: NORMAL
MICROORGANISM CVX/VAG CYTO: NORMAL
NONHDLC SERPL-MCNC: 84 MG/DL
PATHOLOGIST CVX/VAG CYTO: NORMAL
POTASSIUM SERPL-SCNC: 4.3 MMOL/L (ref 3.5–5.1)
PROT SERPL-MCNC: 7 G/DL (ref 6–8.4)
SERVICE CMNT-IMP: NORMAL
SODIUM SERPL-SCNC: 140 MMOL/L (ref 136–145)
SPECIMEN SOURCE CVX/VAG CYTO: NORMAL
STAT OF ADQ CVX/VAG CYTO-IMP: NORMAL
TRIGL SERPL-MCNC: 92 MG/DL (ref 30–150)
TSH SERPL DL<=0.005 MIU/L-ACNC: 0.62 UIU/ML (ref 0.4–4)

## 2021-10-21 PROCEDURE — 80061 LIPID PANEL: CPT | Performed by: INTERNAL MEDICINE

## 2021-10-21 PROCEDURE — 3066F NEPHROPATHY DOC TX: CPT | Mod: CPTII,S$GLB,, | Performed by: INTERNAL MEDICINE

## 2021-10-21 PROCEDURE — 1159F PR MEDICATION LIST DOCUMENTED IN MEDICAL RECORD: ICD-10-PCS | Mod: CPTII,S$GLB,, | Performed by: INTERNAL MEDICINE

## 2021-10-21 PROCEDURE — 83036 HEMOGLOBIN GLYCOSYLATED A1C: CPT | Performed by: INTERNAL MEDICINE

## 2021-10-21 PROCEDURE — 90686 IIV4 VACC NO PRSV 0.5 ML IM: CPT | Mod: S$GLB,,, | Performed by: INTERNAL MEDICINE

## 2021-10-21 PROCEDURE — 4010F PR ACE/ARB THEARPY RXD/TAKEN: ICD-10-PCS | Mod: CPTII,S$GLB,, | Performed by: INTERNAL MEDICINE

## 2021-10-21 PROCEDURE — 90686 FLU VACCINE (QUAD) GREATER THAN OR EQUAL TO 3YO PRESERVATIVE FREE IM: ICD-10-PCS | Mod: S$GLB,,, | Performed by: INTERNAL MEDICINE

## 2021-10-21 PROCEDURE — 4010F ACE/ARB THERAPY RXD/TAKEN: CPT | Mod: CPTII,S$GLB,, | Performed by: INTERNAL MEDICINE

## 2021-10-21 PROCEDURE — 99999 PR PBB SHADOW E&M-EST. PATIENT-LVL IV: CPT | Mod: PBBFAC,,, | Performed by: INTERNAL MEDICINE

## 2021-10-21 PROCEDURE — 3075F PR MOST RECENT SYSTOLIC BLOOD PRESS GE 130-139MM HG: ICD-10-PCS | Mod: CPTII,S$GLB,, | Performed by: INTERNAL MEDICINE

## 2021-10-21 PROCEDURE — 36415 COLL VENOUS BLD VENIPUNCTURE: CPT | Performed by: INTERNAL MEDICINE

## 2021-10-21 PROCEDURE — 90471 IMMUNIZATION ADMIN: CPT | Mod: S$GLB,,, | Performed by: INTERNAL MEDICINE

## 2021-10-21 PROCEDURE — 3075F SYST BP GE 130 - 139MM HG: CPT | Mod: CPTII,S$GLB,, | Performed by: INTERNAL MEDICINE

## 2021-10-21 PROCEDURE — 3008F BODY MASS INDEX DOCD: CPT | Mod: CPTII,S$GLB,, | Performed by: INTERNAL MEDICINE

## 2021-10-21 PROCEDURE — 3079F DIAST BP 80-89 MM HG: CPT | Mod: CPTII,S$GLB,, | Performed by: INTERNAL MEDICINE

## 2021-10-21 PROCEDURE — 99999 PR PBB SHADOW E&M-EST. PATIENT-LVL IV: ICD-10-PCS | Mod: PBBFAC,,, | Performed by: INTERNAL MEDICINE

## 2021-10-21 PROCEDURE — 80053 COMPREHEN METABOLIC PANEL: CPT | Performed by: INTERNAL MEDICINE

## 2021-10-21 PROCEDURE — 3061F NEG MICROALBUMINURIA REV: CPT | Mod: CPTII,S$GLB,, | Performed by: INTERNAL MEDICINE

## 2021-10-21 PROCEDURE — 3061F PR NEG MICROALBUMINURIA RESULT DOCUMENTED/REVIEW: ICD-10-PCS | Mod: CPTII,S$GLB,, | Performed by: INTERNAL MEDICINE

## 2021-10-21 PROCEDURE — 3008F PR BODY MASS INDEX (BMI) DOCUMENTED: ICD-10-PCS | Mod: CPTII,S$GLB,, | Performed by: INTERNAL MEDICINE

## 2021-10-21 PROCEDURE — 99214 OFFICE O/P EST MOD 30 MIN: CPT | Mod: S$GLB,,, | Performed by: INTERNAL MEDICINE

## 2021-10-21 PROCEDURE — 84443 ASSAY THYROID STIM HORMONE: CPT | Performed by: INTERNAL MEDICINE

## 2021-10-21 PROCEDURE — 3044F PR MOST RECENT HEMOGLOBIN A1C LEVEL <7.0%: ICD-10-PCS | Mod: CPTII,S$GLB,, | Performed by: INTERNAL MEDICINE

## 2021-10-21 PROCEDURE — 3079F PR MOST RECENT DIASTOLIC BLOOD PRESSURE 80-89 MM HG: ICD-10-PCS | Mod: CPTII,S$GLB,, | Performed by: INTERNAL MEDICINE

## 2021-10-21 PROCEDURE — 1160F RVW MEDS BY RX/DR IN RCRD: CPT | Mod: CPTII,S$GLB,, | Performed by: INTERNAL MEDICINE

## 2021-10-21 PROCEDURE — 1159F MED LIST DOCD IN RCRD: CPT | Mod: CPTII,S$GLB,, | Performed by: INTERNAL MEDICINE

## 2021-10-21 PROCEDURE — 3044F HG A1C LEVEL LT 7.0%: CPT | Mod: CPTII,S$GLB,, | Performed by: INTERNAL MEDICINE

## 2021-10-21 PROCEDURE — 99214 PR OFFICE/OUTPT VISIT, EST, LEVL IV, 30-39 MIN: ICD-10-PCS | Mod: S$GLB,,, | Performed by: INTERNAL MEDICINE

## 2021-10-21 PROCEDURE — 1160F PR REVIEW ALL MEDS BY PRESCRIBER/CLIN PHARMACIST DOCUMENTED: ICD-10-PCS | Mod: CPTII,S$GLB,, | Performed by: INTERNAL MEDICINE

## 2021-10-21 PROCEDURE — 90471 FLU VACCINE (QUAD) GREATER THAN OR EQUAL TO 3YO PRESERVATIVE FREE IM: ICD-10-PCS | Mod: S$GLB,,, | Performed by: INTERNAL MEDICINE

## 2021-10-21 PROCEDURE — 3066F PR DOCUMENTATION OF TREATMENT FOR NEPHROPATHY: ICD-10-PCS | Mod: CPTII,S$GLB,, | Performed by: INTERNAL MEDICINE

## 2021-10-28 ENCOUNTER — IMMUNIZATION (OUTPATIENT)
Dept: INTERNAL MEDICINE | Facility: CLINIC | Age: 61
End: 2021-10-28
Payer: COMMERCIAL

## 2021-10-28 DIAGNOSIS — Z23 NEED FOR VACCINATION: Primary | ICD-10-CM

## 2021-10-28 PROCEDURE — 91300 COVID-19, MRNA, LNP-S, PF, 30 MCG/0.3 ML DOSE VACCINE: CPT | Mod: PBBFAC | Performed by: INTERNAL MEDICINE

## 2021-10-28 PROCEDURE — 0003A COVID-19, MRNA, LNP-S, PF, 30 MCG/0.3 ML DOSE VACCINE: CPT | Mod: CV19,PBBFAC | Performed by: INTERNAL MEDICINE

## 2021-11-15 ENCOUNTER — TELEPHONE (OUTPATIENT)
Dept: INTERNAL MEDICINE | Facility: CLINIC | Age: 61
End: 2021-11-15
Payer: COMMERCIAL

## 2021-11-15 DIAGNOSIS — E11.69 TYPE 2 DIABETES MELLITUS WITH HYPERLIPIDEMIA: ICD-10-CM

## 2021-11-15 DIAGNOSIS — F41.8 DEPRESSION WITH ANXIETY: ICD-10-CM

## 2021-11-15 DIAGNOSIS — E78.2 MIXED HYPERLIPIDEMIA: ICD-10-CM

## 2021-11-15 DIAGNOSIS — I10 BENIGN ESSENTIAL HTN: ICD-10-CM

## 2021-11-15 DIAGNOSIS — E78.5 TYPE 2 DIABETES MELLITUS WITH HYPERLIPIDEMIA: ICD-10-CM

## 2021-11-15 RX ORDER — METOPROLOL SUCCINATE 50 MG/1
50 TABLET, EXTENDED RELEASE ORAL DAILY
Qty: 90 TABLET | Refills: 3 | Status: SHIPPED | OUTPATIENT
Start: 2021-11-15 | End: 2022-10-27 | Stop reason: SDUPTHER

## 2021-11-15 RX ORDER — METFORMIN HYDROCHLORIDE 500 MG/1
500 TABLET, EXTENDED RELEASE ORAL DAILY
Qty: 90 TABLET | Refills: 3 | Status: SHIPPED | OUTPATIENT
Start: 2021-11-15 | End: 2022-10-27 | Stop reason: SDUPTHER

## 2021-11-15 RX ORDER — ATORVASTATIN CALCIUM 80 MG/1
80 TABLET, FILM COATED ORAL DAILY
Qty: 90 TABLET | Refills: 3 | Status: SHIPPED | OUTPATIENT
Start: 2021-11-15 | End: 2022-10-27 | Stop reason: SDUPTHER

## 2021-11-15 RX ORDER — SERTRALINE HYDROCHLORIDE 25 MG/1
25 TABLET, FILM COATED ORAL DAILY
Qty: 90 TABLET | Refills: 3 | Status: SHIPPED | OUTPATIENT
Start: 2021-11-15 | End: 2022-04-21 | Stop reason: SDUPTHER

## 2021-11-15 RX ORDER — LISINOPRIL 40 MG/1
40 TABLET ORAL DAILY
Qty: 90 TABLET | Refills: 3 | Status: SHIPPED | OUTPATIENT
Start: 2021-11-15 | End: 2022-10-27 | Stop reason: SDUPTHER

## 2021-11-15 RX ORDER — AMLODIPINE BESYLATE 5 MG/1
5 TABLET ORAL DAILY
Qty: 90 TABLET | Refills: 3 | Status: SHIPPED | OUTPATIENT
Start: 2021-11-15 | End: 2022-10-27 | Stop reason: SDUPTHER

## 2022-04-18 ENCOUNTER — TELEPHONE (OUTPATIENT)
Dept: INTERNAL MEDICINE | Facility: CLINIC | Age: 62
End: 2022-04-18
Payer: COMMERCIAL

## 2022-04-18 DIAGNOSIS — E11.69 TYPE 2 DIABETES MELLITUS WITH HYPERLIPIDEMIA: Primary | ICD-10-CM

## 2022-04-18 DIAGNOSIS — E78.5 TYPE 2 DIABETES MELLITUS WITH HYPERLIPIDEMIA: Primary | ICD-10-CM

## 2022-04-18 NOTE — TELEPHONE ENCOUNTER
----- Message from Liyah Sheehan sent at 4/18/2022 10:15 AM CDT -----  Contact: Suni   Suni would like a call back. She has an appt on this Thursday @ 10:30 with Dr Chin. She has questions about having lab work done.

## 2022-04-21 ENCOUNTER — PATIENT OUTREACH (OUTPATIENT)
Dept: ADMINISTRATIVE | Facility: HOSPITAL | Age: 62
End: 2022-04-21
Payer: COMMERCIAL

## 2022-04-21 ENCOUNTER — OFFICE VISIT (OUTPATIENT)
Dept: INTERNAL MEDICINE | Facility: CLINIC | Age: 62
End: 2022-04-21
Payer: COMMERCIAL

## 2022-04-21 ENCOUNTER — IMMUNIZATION (OUTPATIENT)
Dept: INTERNAL MEDICINE | Facility: CLINIC | Age: 62
End: 2022-04-21
Payer: COMMERCIAL

## 2022-04-21 ENCOUNTER — LAB VISIT (OUTPATIENT)
Dept: LAB | Facility: HOSPITAL | Age: 62
End: 2022-04-21
Attending: INTERNAL MEDICINE
Payer: COMMERCIAL

## 2022-04-21 VITALS
OXYGEN SATURATION: 99 % | SYSTOLIC BLOOD PRESSURE: 130 MMHG | HEIGHT: 64 IN | WEIGHT: 152.56 LBS | BODY MASS INDEX: 26.05 KG/M2 | HEART RATE: 66 BPM | DIASTOLIC BLOOD PRESSURE: 70 MMHG

## 2022-04-21 DIAGNOSIS — Z11.4 SCREENING FOR HIV (HUMAN IMMUNODEFICIENCY VIRUS): ICD-10-CM

## 2022-04-21 DIAGNOSIS — E78.5 TYPE 2 DIABETES MELLITUS WITH HYPERLIPIDEMIA: ICD-10-CM

## 2022-04-21 DIAGNOSIS — E11.69 TYPE 2 DIABETES MELLITUS WITH HYPERLIPIDEMIA: ICD-10-CM

## 2022-04-21 DIAGNOSIS — Z23 NEED FOR VACCINATION: Primary | ICD-10-CM

## 2022-04-21 DIAGNOSIS — I10 BENIGN ESSENTIAL HTN: ICD-10-CM

## 2022-04-21 DIAGNOSIS — K21.9 GERD WITHOUT ESOPHAGITIS: ICD-10-CM

## 2022-04-21 DIAGNOSIS — E04.2 MULTINODULAR GOITER (NONTOXIC): ICD-10-CM

## 2022-04-21 DIAGNOSIS — H40.9 GLAUCOMA, UNSPECIFIED GLAUCOMA TYPE, UNSPECIFIED LATERALITY: ICD-10-CM

## 2022-04-21 DIAGNOSIS — M85.852 OSTEOPENIA OF NECK OF LEFT FEMUR: ICD-10-CM

## 2022-04-21 DIAGNOSIS — E78.2 MIXED HYPERLIPIDEMIA: ICD-10-CM

## 2022-04-21 DIAGNOSIS — Z12.31 SCREENING MAMMOGRAM, ENCOUNTER FOR: ICD-10-CM

## 2022-04-21 DIAGNOSIS — F41.8 DEPRESSION WITH ANXIETY: ICD-10-CM

## 2022-04-21 DIAGNOSIS — Z00.00 VISIT FOR ANNUAL HEALTH EXAMINATION: Primary | ICD-10-CM

## 2022-04-21 PROBLEM — Z12.11 SCREENING FOR MALIGNANT NEOPLASM OF COLON: Status: RESOLVED | Noted: 2021-02-11 | Resolved: 2022-04-21

## 2022-04-21 LAB
ALBUMIN/CREAT UR: 6 UG/MG (ref 0–30)
CREAT UR-MCNC: 201 MG/DL (ref 15–325)
MICROALBUMIN UR DL<=1MG/L-MCNC: 12 UG/ML

## 2022-04-21 PROCEDURE — 99396 PR PREVENTIVE VISIT,EST,40-64: ICD-10-PCS | Mod: S$GLB,,, | Performed by: INTERNAL MEDICINE

## 2022-04-21 PROCEDURE — 3008F BODY MASS INDEX DOCD: CPT | Mod: CPTII,S$GLB,, | Performed by: INTERNAL MEDICINE

## 2022-04-21 PROCEDURE — 82570 ASSAY OF URINE CREATININE: CPT | Performed by: INTERNAL MEDICINE

## 2022-04-21 PROCEDURE — 91305 COVID-19, MRNA, LNP-S, PF, 30 MCG/0.3 ML DOSE VACCINE (PFIZER): CPT | Mod: PBBFAC | Performed by: INTERNAL MEDICINE

## 2022-04-21 PROCEDURE — 3075F SYST BP GE 130 - 139MM HG: CPT | Mod: CPTII,S$GLB,, | Performed by: INTERNAL MEDICINE

## 2022-04-21 PROCEDURE — 1159F PR MEDICATION LIST DOCUMENTED IN MEDICAL RECORD: ICD-10-PCS | Mod: CPTII,S$GLB,, | Performed by: INTERNAL MEDICINE

## 2022-04-21 PROCEDURE — 4010F PR ACE/ARB THEARPY RXD/TAKEN: ICD-10-PCS | Mod: CPTII,S$GLB,, | Performed by: INTERNAL MEDICINE

## 2022-04-21 PROCEDURE — 99999 PR PBB SHADOW E&M-EST. PATIENT-LVL IV: ICD-10-PCS | Mod: PBBFAC,,, | Performed by: INTERNAL MEDICINE

## 2022-04-21 PROCEDURE — 99396 PREV VISIT EST AGE 40-64: CPT | Mod: S$GLB,,, | Performed by: INTERNAL MEDICINE

## 2022-04-21 PROCEDURE — 82043 UR ALBUMIN QUANTITATIVE: CPT | Performed by: INTERNAL MEDICINE

## 2022-04-21 PROCEDURE — 1160F RVW MEDS BY RX/DR IN RCRD: CPT | Mod: CPTII,S$GLB,, | Performed by: INTERNAL MEDICINE

## 2022-04-21 PROCEDURE — 1159F MED LIST DOCD IN RCRD: CPT | Mod: CPTII,S$GLB,, | Performed by: INTERNAL MEDICINE

## 2022-04-21 PROCEDURE — 3075F PR MOST RECENT SYSTOLIC BLOOD PRESS GE 130-139MM HG: ICD-10-PCS | Mod: CPTII,S$GLB,, | Performed by: INTERNAL MEDICINE

## 2022-04-21 PROCEDURE — 99999 PR PBB SHADOW E&M-EST. PATIENT-LVL IV: CPT | Mod: PBBFAC,,, | Performed by: INTERNAL MEDICINE

## 2022-04-21 PROCEDURE — 4010F ACE/ARB THERAPY RXD/TAKEN: CPT | Mod: CPTII,S$GLB,, | Performed by: INTERNAL MEDICINE

## 2022-04-21 PROCEDURE — 3078F DIAST BP <80 MM HG: CPT | Mod: CPTII,S$GLB,, | Performed by: INTERNAL MEDICINE

## 2022-04-21 PROCEDURE — 1160F PR REVIEW ALL MEDS BY PRESCRIBER/CLIN PHARMACIST DOCUMENTED: ICD-10-PCS | Mod: CPTII,S$GLB,, | Performed by: INTERNAL MEDICINE

## 2022-04-21 PROCEDURE — 3008F PR BODY MASS INDEX (BMI) DOCUMENTED: ICD-10-PCS | Mod: CPTII,S$GLB,, | Performed by: INTERNAL MEDICINE

## 2022-04-21 PROCEDURE — 3078F PR MOST RECENT DIASTOLIC BLOOD PRESSURE < 80 MM HG: ICD-10-PCS | Mod: CPTII,S$GLB,, | Performed by: INTERNAL MEDICINE

## 2022-04-21 RX ORDER — SERTRALINE HYDROCHLORIDE 50 MG/1
50 TABLET, FILM COATED ORAL DAILY
Qty: 90 TABLET | Refills: 3 | Status: SHIPPED | OUTPATIENT
Start: 2022-04-21 | End: 2023-04-12

## 2022-04-21 NOTE — PROGRESS NOTES
INTERNAL MEDICINE ESTABLISHED PATIENT VISIT NOTE    Subjective:     Chief Complaint: Annual Exam       Patient ID: Suni Rodríguez is a 61 y.o. female with HTN, type 2 DM uncomplicated, depression c anxiety, GERD, glaucoma, nontoxic MNG (last u/s 3/2020 c mult nodules c plan to repeat u/s in 3 yrs), hx adhesive capsulitis of L shoulder, last seen by me in Oct, here today for annual exam.    Did labs this morning but results still pending.  Feeling well overall.  No cp or sob.  Intermittent compliance c diet.  Occasional tingling in her toes but nothing consistent.  No polyuria or polydipsia.  Taking all meds as rx'ed.    Past Medical History:  Past Medical History:   Diagnosis Date    Adhesive capsulitis of left shoulder     Benign essential HTN     Depression with anxiety     GERD without esophagitis     Glaucoma     Mixed hyperlipidemia     Multinodular goiter (nontoxic)     Uncomplicated type 2 diabetes mellitus        Home Medications:  Prior to Admission medications    Medication Sig Start Date End Date Taking? Authorizing Provider   amLODIPine (NORVASC) 5 MG tablet Take 1 tablet (5 mg total) by mouth once daily. 11/15/21  Yes Jade Chin MD   atorvastatin (LIPITOR) 80 MG tablet Take 1 tablet (80 mg total) by mouth once daily. 11/15/21  Yes Jade Chin MD   dorzolamide-timolol 2-0.5% (COSOPT) 22.3-6.8 mg/mL ophthalmic solution INSTILL 1 DROP INTO BOTH EYES 2 TIMES DAILY. 3/2/17  Yes Historical Provider   latanoprost 0.005 % ophthalmic solution  4/23/17  Yes Historical Provider   lisinopriL (PRINIVIL,ZESTRIL) 40 MG tablet Take 1 tablet (40 mg total) by mouth once daily. 11/15/21  Yes Jade Chin MD   metFORMIN (GLUCOPHAGE-XR) 500 MG ER 24hr tablet Take 1 tablet (500 mg total) by mouth once daily. 11/15/21  Yes Jade Chin MD   metoprolol succinate (TOPROL-XL) 50 MG 24 hr tablet Take 1 tablet (50 mg total) by mouth once daily. 11/15/21  Yes Jade Chin MD   multivitamin (THERAGRAN) per tablet Take 1 tablet  by mouth once daily.   Yes Historical Provider   sertraline (ZOLOFT) 25 MG tablet Take 1 tablet (25 mg total) by mouth once daily. 11/15/21  Yes Jade Chin MD       Allergies:  Review of patient's allergies indicates:   Allergen Reactions    Penicillins Hives       Social History:  Social History     Tobacco Use    Smoking status: Former Smoker     Packs/day: 1.00     Years: 20.00     Pack years: 20.00    Smokeless tobacco: Never Used    Tobacco comment: quit smoking in 1996   Substance Use Topics    Alcohol use: Yes     Comment: rare use    Drug use: No        Review of Systems   Constitutional: Negative for appetite change, chills, fatigue, fever and unexpected weight change.   HENT: Negative for congestion, hearing loss and rhinorrhea.    Eyes: Negative for pain and visual disturbance.   Respiratory: Negative for cough, chest tightness, shortness of breath and wheezing.    Cardiovascular: Negative for chest pain, palpitations and leg swelling.   Gastrointestinal: Negative for abdominal distention and abdominal pain.   Endocrine: Negative for polydipsia and polyuria.   Genitourinary: Negative for decreased urine volume, difficulty urinating, dysuria, hematuria and vaginal discharge.   Neurological: Negative for weakness, numbness and headaches.   Psychiatric/Behavioral: Negative for behavioral problems and confusion.         Health Maintenance:     Immunizations:   Influenza 10/2021  TDap is up to date 1/2016  Pneumovax is up to date. 2013 since DM  Shingrix completed 5/2020, 10/2020  COVID vaccines completed Pfizer 3/2/2021, 3/23/2021, 10/2021, repeat booster now.     Cancer Screening:  PAP: 10/2021 NILM c neg HPV c Tiffany Reynaga, rec f/u in Oct.  Mammogram: 5/2021 benign, will schedule repeat for May  Colonoscopy: 2/2021, polyp removed, path c/w tubular adenoma, rec repeat in 7 yrs.  DEXA:  12/2020 c osteopenia, can repeat in 2022, can order at f/u    Objective:   /70 (BP Location: Right arm,  "Patient Position: Sitting, BP Method: Medium (Manual))   Pulse 66   Ht 5' 4" (1.626 m)   Wt 69.2 kg (152 lb 8.9 oz)   SpO2 99%   BMI 26.19 kg/m²        General: AAO x3, no apparent distress  HEENT: PERRL, OP clear  CV: RRR, no m/r/g  Pulm: Lungs CTAB, no crackles, no wheezes  Abd: s/NT/ND +BS  Extremities: no c/c/e  Foot exam completed today.  No decreased sensation with monofilament bilaterally.  No ulcerations noted.  No calluses.  2+ distal pulses bilaterally.      Labs:     Lab Results   Component Value Date    WBC 8.16 04/08/2021    HGB 13.7 04/08/2021    HCT 42.6 04/08/2021    MCV 89 04/08/2021     04/08/2021     Sodium   Date Value Ref Range Status   10/21/2021 140 136 - 145 mmol/L Final     Potassium   Date Value Ref Range Status   10/21/2021 4.3 3.5 - 5.1 mmol/L Final     Chloride   Date Value Ref Range Status   10/21/2021 106 95 - 110 mmol/L Final     CO2   Date Value Ref Range Status   10/21/2021 26 23 - 29 mmol/L Final     Glucose   Date Value Ref Range Status   10/21/2021 111 (H) 70 - 110 mg/dL Final     BUN   Date Value Ref Range Status   10/21/2021 9 8 - 23 mg/dL Final     Creatinine   Date Value Ref Range Status   10/21/2021 0.8 0.5 - 1.4 mg/dL Final     Calcium   Date Value Ref Range Status   10/21/2021 9.4 8.7 - 10.5 mg/dL Final     Total Protein   Date Value Ref Range Status   10/21/2021 7.0 6.0 - 8.4 g/dL Final     Albumin   Date Value Ref Range Status   10/21/2021 4.0 3.5 - 5.2 g/dL Final     Total Bilirubin   Date Value Ref Range Status   10/21/2021 0.8 0.1 - 1.0 mg/dL Final     Comment:     For infants and newborns, interpretation of results should be based  on gestational age, weight and in agreement with clinical  observations.    Premature Infant recommended reference ranges:  Up to 24 hours.............<8.0 mg/dL  Up to 48 hours............<12.0 mg/dL  3-5 days..................<15.0 mg/dL  6-29 days.................<15.0 mg/dL       Alkaline Phosphatase   Date Value Ref Range " Status   10/21/2021 100 55 - 135 U/L Final     AST   Date Value Ref Range Status   10/21/2021 20 10 - 40 U/L Final     ALT   Date Value Ref Range Status   10/21/2021 20 10 - 44 U/L Final     Anion Gap   Date Value Ref Range Status   10/21/2021 8 8 - 16 mmol/L Final     eGFR if    Date Value Ref Range Status   10/21/2021 >60.0 >60 mL/min/1.73 m^2 Final     eGFR if non    Date Value Ref Range Status   10/21/2021 >60.0 >60 mL/min/1.73 m^2 Final     Comment:     Calculation used to obtain the estimated glomerular filtration  rate (eGFR) is the CKD-EPI equation.        Lab Results   Component Value Date    HGBA1C 6.3 (H) 10/21/2021     Lab Results   Component Value Date    LDLCALC 65.6 10/21/2021     Lab Results   Component Value Date    TSH 0.618 10/21/2021         Assessment/Plan     Suni was seen today for annual exam.    Diagnoses and all orders for this visit:    Visit for annual health examination  History and physical exam completed.  Health maintenance reviewed as above.    Type 2 diabetes mellitus with hyperlipidemia  Last A1c in Oct 6.3 stable on metformin  Repeat labs done this morning pending, will message pt c results.  Cont meds for now.  [facility] exam from Dr[facility], will request records via [facility]  Foot exam done today  Urine studies pending.   -     Comprehensive Metabolic Panel; Future  -     Hemoglobin A1C; Future    Mixed hyperlipidemia  Lab Results   Component Value Date    LDLCALC 65.6 10/21/2021     At goal on last check in Oct  Repeat labs pending  Cont statin for now.  -     Lipid Panel; Future    Benign essential HTN  at goal.  Cont current medications.    Depression with anxiety  Some recent increased anxiety, has been taking 2 tablets daily, okay to increase rx'ed dose from 25 to 50 mg daily  -     sertraline (ZOLOFT) 50 MG tablet; Take 1 tablet (50 mg total) by mouth once daily.    Multinodular goiter (nontoxic)  Not req surveillance    GERD without  esophagitis  No acute issues    Glaucoma, unspecified glaucoma type, unspecified laterality  Stable, cont f/u c optho at [facility]    Osteopenia of neck of left femur  DEXA utd, will order repeat at f/u visit    Screening mammogram, encounter for  -     Mammo Digital Screening Bilat w/ [place]; Future    Screening for HIV (human immunodeficiency virus)  -     HIV 1/2 Ag/Ab (4th Gen); Future      HM as above  RTC in 6 mos c fasting labs prior, sooner if needed.    Jade Chin MD  Department of Internal Medicine - [facility]  04/21/2022

## 2022-04-21 NOTE — PROGRESS NOTES
Health Maintenance Due   Topic Date Due    HIV Screening  Never done    Eye Exam  04/08/2022    Diabetes Urine Screening  04/08/2022    Hemoglobin A1c  04/21/2022    Mammogram  05/20/2022     Triggered LINKS. Updated Care Everywhere. Pt has lab appt scheduled for 4/21/2022. Record request has been sent to Dr. Ailin Heard asking for a copy of pt's most recent eye exam results. Chart review completed.

## 2022-04-21 NOTE — LETTER
AUTHORIZATION FOR RELEASE OF   CONFIDENTIAL INFORMATION    Dear Dr. Ailin Heard,    We are seeing Suni Rodríguez, date of birth 1960, in the clinic at [facility] INTERNAL MEDICINE. Jade Chin MD is the patient's PCP. Suni Rodríguez has an outstanding lab/procedure at the time we reviewed her chart. In order to help keep her health information updated, she has authorized us to request the following medical record(s):        (  )  MAMMOGRAM                                      (  )  COLONOSCOPY      (  )  PAP SMEAR                                          (  )  OUTSIDE LAB RESULTS     (  )  DEXA SCAN                                          ( X )  EYE EXAM            (  )  FOOT EXAM                                          (  )  ENTIRE RECORD     (  )  OUTSIDE IMMUNIZATIONS                 (  )  _______________         Please fax records to [facility], Le Kim Nguyen, MD, [phone]     If you have any questions, please contact DAKOTA Ballesteros at [phone].           Patient Name: Suni Rodríguez  : 1960  Patient Phone #: [phone]

## 2022-04-21 NOTE — Clinical Note
Please get records for her eye exam Dr. Heard at [facility], last seen in March

## 2022-05-05 ENCOUNTER — PATIENT OUTREACH (OUTPATIENT)
Dept: ADMINISTRATIVE | Facility: HOSPITAL | Age: 62
End: 2022-05-05
Payer: COMMERCIAL

## 2022-05-05 NOTE — LETTER
AUTHORIZATION FOR RELEASE OF   CONFIDENTIAL INFORMATION    Dear Dr. Ailin Heard,    We are seeing Suni Rodríguez, date of birth 1960, in the clinic at [facility] INTERNAL MEDICINE. Jade Chin MD is the patient's PCP. Suni Rodríguez has an outstanding lab/procedure at the time we reviewed her chart. In order to help keep her health information updated, she has authorized us to request the following medical record(s):        (  )  MAMMOGRAM                                      (  )  COLONOSCOPY      (  )  PAP SMEAR                                          (  )  OUTSIDE LAB RESULTS     (  )  DEXA SCAN                                          ( X )  EYE EXAM            (  )  FOOT EXAM                                          (  )  ENTIRE RECORD     (  )  OUTSIDE IMMUNIZATIONS                 (  )  _______________         Please fax records to [facility], Le Kim Nguyen, MD, [phone]     If you have any questions, please contact DAKOTA Ballesteros at [phone].           Patient Name: Suni Rodríguez  : 1960  Patient Phone #: [phone]

## 2022-05-05 NOTE — PROGRESS NOTES
Health Maintenance Due   Topic Date Due    HIV Screening  Never done    Eye Exam  04/08/2022    Mammogram  05/20/2022     Triggered LINKS. Updated Care Everywhere. Pt has appts scheduled for labs and mammogram. Record request has been resent to Dr. Ailin Heard asking for a copy of pt's most recent eye exam results. Chart review completed.

## 2022-07-11 ENCOUNTER — PATIENT MESSAGE (OUTPATIENT)
Dept: ADMINISTRATIVE | Facility: HOSPITAL | Age: 62
End: 2022-07-11
Payer: COMMERCIAL

## 2022-08-11 ENCOUNTER — TELEPHONE (OUTPATIENT)
Dept: ENDOSCOPY | Facility: HOSPITAL | Age: 62
End: 2022-08-11
Payer: COMMERCIAL

## 2022-09-29 ENCOUNTER — HOSPITAL ENCOUNTER (OUTPATIENT)
Dept: RADIOLOGY | Facility: HOSPITAL | Age: 62
Discharge: HOME | End: 2022-09-29
Attending: INTERNAL MEDICINE
Payer: COMMERCIAL

## 2022-09-29 VITALS — HEIGHT: 64 IN | BODY MASS INDEX: 25.27 KG/M2 | WEIGHT: 148 LBS

## 2022-09-29 DIAGNOSIS — Z12.31 SCREENING MAMMOGRAM, ENCOUNTER FOR: ICD-10-CM

## 2022-09-29 PROCEDURE — 77063 BREAST TOMOSYNTHESIS BI: CPT | Mod: 26,,, | Performed by: RADIOLOGY

## 2022-09-29 PROCEDURE — 77067 MAMMO DIGITAL SCREENING BILAT WITH TOMO: ICD-10-PCS | Mod: 26,,, | Performed by: RADIOLOGY

## 2022-09-29 PROCEDURE — 77063 MAMMO DIGITAL SCREENING BILAT WITH TOMO: ICD-10-PCS | Mod: 26,,, | Performed by: RADIOLOGY

## 2022-09-29 PROCEDURE — 77063 BREAST TOMOSYNTHESIS BI: CPT | Mod: TC

## 2022-09-29 PROCEDURE — 77067 SCR MAMMO BI INCL CAD: CPT | Mod: 26,,, | Performed by: RADIOLOGY

## 2022-10-20 ENCOUNTER — IMMUNIZATION (OUTPATIENT)
Dept: INTERNAL MEDICINE | Facility: CLINIC | Age: 62
End: 2022-10-20
Payer: COMMERCIAL

## 2022-10-20 ENCOUNTER — LAB VISIT (OUTPATIENT)
Dept: LAB | Facility: HOSPITAL | Age: 62
End: 2022-10-20
Attending: INTERNAL MEDICINE
Payer: COMMERCIAL

## 2022-10-20 DIAGNOSIS — Z11.4 SCREENING FOR HIV (HUMAN IMMUNODEFICIENCY VIRUS): ICD-10-CM

## 2022-10-20 DIAGNOSIS — Z23 NEED FOR VACCINATION: Primary | ICD-10-CM

## 2022-10-20 DIAGNOSIS — E78.2 MIXED HYPERLIPIDEMIA: ICD-10-CM

## 2022-10-20 DIAGNOSIS — E11.69 TYPE 2 DIABETES MELLITUS WITH HYPERLIPIDEMIA: ICD-10-CM

## 2022-10-20 DIAGNOSIS — E78.5 TYPE 2 DIABETES MELLITUS WITH HYPERLIPIDEMIA: ICD-10-CM

## 2022-10-20 LAB
ALBUMIN SERPL BCP-MCNC: 4 G/DL (ref 3.5–5.2)
ALP SERPL-CCNC: 128 U/L (ref 55–135)
ALT SERPL W/O P-5'-P-CCNC: 29 U/L (ref 10–44)
ANION GAP SERPL CALC-SCNC: 6 MMOL/L (ref 8–16)
AST SERPL-CCNC: 22 U/L (ref 10–40)
BILIRUB SERPL-MCNC: 0.7 MG/DL (ref 0.1–1)
BUN SERPL-MCNC: 9 MG/DL (ref 8–23)
CALCIUM SERPL-MCNC: 9.7 MG/DL (ref 8.7–10.5)
CHLORIDE SERPL-SCNC: 104 MMOL/L (ref 95–110)
CHOLEST SERPL-MCNC: 126 MG/DL (ref 120–199)
CHOLEST/HDLC SERPL: 3.3 {RATIO} (ref 2–5)
CO2 SERPL-SCNC: 29 MMOL/L (ref 23–29)
CREAT SERPL-MCNC: 0.7 MG/DL (ref 0.5–1.4)
EST. GFR  (NO RACE VARIABLE): >60 ML/MIN/1.73 M^2
ESTIMATED AVG GLUCOSE: 131 MG/DL (ref 68–131)
GLUCOSE SERPL-MCNC: 115 MG/DL (ref 70–110)
HBA1C MFR BLD: 6.2 % (ref 4–5.6)
HDLC SERPL-MCNC: 38 MG/DL (ref 40–75)
HDLC SERPL: 30.2 % (ref 20–50)
HIV 1+2 AB+HIV1 P24 AG SERPL QL IA: NORMAL
LDLC SERPL CALC-MCNC: 69.6 MG/DL (ref 63–159)
NONHDLC SERPL-MCNC: 88 MG/DL
POTASSIUM SERPL-SCNC: 4.6 MMOL/L (ref 3.5–5.1)
PROT SERPL-MCNC: 7.3 G/DL (ref 6–8.4)
SODIUM SERPL-SCNC: 139 MMOL/L (ref 136–145)
TRIGL SERPL-MCNC: 92 MG/DL (ref 30–150)

## 2022-10-20 PROCEDURE — 80061 LIPID PANEL: CPT | Performed by: INTERNAL MEDICINE

## 2022-10-20 PROCEDURE — 80053 COMPREHEN METABOLIC PANEL: CPT | Performed by: INTERNAL MEDICINE

## 2022-10-20 PROCEDURE — 91312 COVID-19, MRNA, LNP-S, BIVALENT BOOSTER, PF, 30 MCG/0.3 ML DOSE: CPT | Mod: S$GLB,,, | Performed by: INTERNAL MEDICINE

## 2022-10-20 PROCEDURE — 0124A COVID-19, MRNA, LNP-S, BIVALENT BOOSTER, PF, 30 MCG/0.3 ML DOSE: CPT | Mod: CV19,PBBFAC | Performed by: INTERNAL MEDICINE

## 2022-10-20 PROCEDURE — 36415 COLL VENOUS BLD VENIPUNCTURE: CPT | Performed by: INTERNAL MEDICINE

## 2022-10-20 PROCEDURE — 91312 COVID-19, MRNA, LNP-S, BIVALENT BOOSTER, PF, 30 MCG/0.3 ML DOSE: ICD-10-PCS | Mod: S$GLB,,, | Performed by: INTERNAL MEDICINE

## 2022-10-20 PROCEDURE — 87389 HIV-1 AG W/HIV-1&-2 AB AG IA: CPT | Performed by: INTERNAL MEDICINE

## 2022-10-20 PROCEDURE — 83036 HEMOGLOBIN GLYCOSYLATED A1C: CPT | Performed by: INTERNAL MEDICINE

## 2022-10-27 ENCOUNTER — IMMUNIZATION (OUTPATIENT)
Dept: INTERNAL MEDICINE | Facility: CLINIC | Age: 62
End: 2022-10-27
Payer: COMMERCIAL

## 2022-10-27 ENCOUNTER — OFFICE VISIT (OUTPATIENT)
Dept: INTERNAL MEDICINE | Facility: CLINIC | Age: 62
End: 2022-10-27
Payer: COMMERCIAL

## 2022-10-27 VITALS
BODY MASS INDEX: 25.45 KG/M2 | OXYGEN SATURATION: 99 % | HEART RATE: 75 BPM | HEIGHT: 64 IN | WEIGHT: 149.06 LBS | DIASTOLIC BLOOD PRESSURE: 82 MMHG | SYSTOLIC BLOOD PRESSURE: 126 MMHG

## 2022-10-27 DIAGNOSIS — F41.8 DEPRESSION WITH ANXIETY: ICD-10-CM

## 2022-10-27 DIAGNOSIS — I10 BENIGN ESSENTIAL HTN: Primary | ICD-10-CM

## 2022-10-27 DIAGNOSIS — E78.5 TYPE 2 DIABETES MELLITUS WITH HYPERLIPIDEMIA: ICD-10-CM

## 2022-10-27 DIAGNOSIS — M85.852 OSTEOPENIA OF NECK OF LEFT FEMUR: ICD-10-CM

## 2022-10-27 DIAGNOSIS — E04.2 MULTINODULAR GOITER (NONTOXIC): ICD-10-CM

## 2022-10-27 DIAGNOSIS — E11.69 TYPE 2 DIABETES MELLITUS WITH HYPERLIPIDEMIA: ICD-10-CM

## 2022-10-27 DIAGNOSIS — L71.0 PERIORAL DERMATITIS: ICD-10-CM

## 2022-10-27 DIAGNOSIS — E78.2 MIXED HYPERLIPIDEMIA: ICD-10-CM

## 2022-10-27 DIAGNOSIS — Z01.419 WELL WOMAN EXAM: ICD-10-CM

## 2022-10-27 DIAGNOSIS — K21.9 GERD WITHOUT ESOPHAGITIS: ICD-10-CM

## 2022-10-27 PROCEDURE — 3066F PR DOCUMENTATION OF TREATMENT FOR NEPHROPATHY: ICD-10-PCS | Mod: CPTII,S$GLB,, | Performed by: INTERNAL MEDICINE

## 2022-10-27 PROCEDURE — 3061F NEG MICROALBUMINURIA REV: CPT | Mod: CPTII,S$GLB,, | Performed by: INTERNAL MEDICINE

## 2022-10-27 PROCEDURE — 1160F RVW MEDS BY RX/DR IN RCRD: CPT | Mod: CPTII,S$GLB,, | Performed by: INTERNAL MEDICINE

## 2022-10-27 PROCEDURE — 99214 PR OFFICE/OUTPT VISIT, EST, LEVL IV, 30-39 MIN: ICD-10-PCS | Mod: 25,S$GLB,, | Performed by: INTERNAL MEDICINE

## 2022-10-27 PROCEDURE — 90471 FLU VACCINE (QUAD) GREATER THAN OR EQUAL TO 3YO PRESERVATIVE FREE IM: ICD-10-PCS | Mod: S$GLB,,, | Performed by: INTERNAL MEDICINE

## 2022-10-27 PROCEDURE — 1159F MED LIST DOCD IN RCRD: CPT | Mod: CPTII,S$GLB,, | Performed by: INTERNAL MEDICINE

## 2022-10-27 PROCEDURE — 3066F NEPHROPATHY DOC TX: CPT | Mod: CPTII,S$GLB,, | Performed by: INTERNAL MEDICINE

## 2022-10-27 PROCEDURE — 3061F PR NEG MICROALBUMINURIA RESULT DOCUMENTED/REVIEW: ICD-10-PCS | Mod: CPTII,S$GLB,, | Performed by: INTERNAL MEDICINE

## 2022-10-27 PROCEDURE — 3044F HG A1C LEVEL LT 7.0%: CPT | Mod: CPTII,S$GLB,, | Performed by: INTERNAL MEDICINE

## 2022-10-27 PROCEDURE — 3074F PR MOST RECENT SYSTOLIC BLOOD PRESSURE < 130 MM HG: ICD-10-PCS | Mod: CPTII,S$GLB,, | Performed by: INTERNAL MEDICINE

## 2022-10-27 PROCEDURE — 4010F ACE/ARB THERAPY RXD/TAKEN: CPT | Mod: CPTII,S$GLB,, | Performed by: INTERNAL MEDICINE

## 2022-10-27 PROCEDURE — 99214 OFFICE O/P EST MOD 30 MIN: CPT | Mod: 25,S$GLB,, | Performed by: INTERNAL MEDICINE

## 2022-10-27 PROCEDURE — 3079F PR MOST RECENT DIASTOLIC BLOOD PRESSURE 80-89 MM HG: ICD-10-PCS | Mod: CPTII,S$GLB,, | Performed by: INTERNAL MEDICINE

## 2022-10-27 PROCEDURE — 99999 PR PBB SHADOW E&M-EST. PATIENT-LVL V: CPT | Mod: PBBFAC,,, | Performed by: INTERNAL MEDICINE

## 2022-10-27 PROCEDURE — 3079F DIAST BP 80-89 MM HG: CPT | Mod: CPTII,S$GLB,, | Performed by: INTERNAL MEDICINE

## 2022-10-27 PROCEDURE — 99999 PR PBB SHADOW E&M-EST. PATIENT-LVL V: ICD-10-PCS | Mod: PBBFAC,,, | Performed by: INTERNAL MEDICINE

## 2022-10-27 PROCEDURE — 3074F SYST BP LT 130 MM HG: CPT | Mod: CPTII,S$GLB,, | Performed by: INTERNAL MEDICINE

## 2022-10-27 PROCEDURE — 90686 IIV4 VACC NO PRSV 0.5 ML IM: CPT | Mod: S$GLB,,, | Performed by: INTERNAL MEDICINE

## 2022-10-27 PROCEDURE — 3044F PR MOST RECENT HEMOGLOBIN A1C LEVEL <7.0%: ICD-10-PCS | Mod: CPTII,S$GLB,, | Performed by: INTERNAL MEDICINE

## 2022-10-27 PROCEDURE — 1159F PR MEDICATION LIST DOCUMENTED IN MEDICAL RECORD: ICD-10-PCS | Mod: CPTII,S$GLB,, | Performed by: INTERNAL MEDICINE

## 2022-10-27 PROCEDURE — 4010F PR ACE/ARB THEARPY RXD/TAKEN: ICD-10-PCS | Mod: CPTII,S$GLB,, | Performed by: INTERNAL MEDICINE

## 2022-10-27 PROCEDURE — 90471 IMMUNIZATION ADMIN: CPT | Mod: S$GLB,,, | Performed by: INTERNAL MEDICINE

## 2022-10-27 PROCEDURE — 90686 FLU VACCINE (QUAD) GREATER THAN OR EQUAL TO 3YO PRESERVATIVE FREE IM: ICD-10-PCS | Mod: S$GLB,,, | Performed by: INTERNAL MEDICINE

## 2022-10-27 PROCEDURE — 1160F PR REVIEW ALL MEDS BY PRESCRIBER/CLIN PHARMACIST DOCUMENTED: ICD-10-PCS | Mod: CPTII,S$GLB,, | Performed by: INTERNAL MEDICINE

## 2022-10-27 RX ORDER — METRONIDAZOLE 10 MG/G
GEL TOPICAL DAILY
Qty: 60 G | Refills: 0 | Status: SHIPPED | OUTPATIENT
Start: 2022-10-27 | End: 2023-04-13

## 2022-10-27 RX ORDER — AMLODIPINE BESYLATE 5 MG/1
5 TABLET ORAL DAILY
Qty: 90 TABLET | Refills: 3 | Status: SHIPPED | OUTPATIENT
Start: 2022-10-27 | End: 2023-10-26 | Stop reason: SDUPTHER

## 2022-10-27 RX ORDER — LISINOPRIL 40 MG/1
40 TABLET ORAL DAILY
Qty: 90 TABLET | Refills: 3 | Status: SHIPPED | OUTPATIENT
Start: 2022-10-27 | End: 2022-12-19 | Stop reason: SDUPTHER

## 2022-10-27 RX ORDER — ATORVASTATIN CALCIUM 80 MG/1
80 TABLET, FILM COATED ORAL DAILY
Qty: 90 TABLET | Refills: 3 | Status: SHIPPED | OUTPATIENT
Start: 2022-10-27 | End: 2023-10-26 | Stop reason: SDUPTHER

## 2022-10-27 RX ORDER — METOPROLOL SUCCINATE 50 MG/1
50 TABLET, EXTENDED RELEASE ORAL DAILY
Qty: 90 TABLET | Refills: 3 | Status: SHIPPED | OUTPATIENT
Start: 2022-10-27 | End: 2023-10-26 | Stop reason: SDUPTHER

## 2022-10-27 RX ORDER — METFORMIN HYDROCHLORIDE 500 MG/1
500 TABLET, EXTENDED RELEASE ORAL DAILY
Qty: 90 TABLET | Refills: 3 | Status: SHIPPED | OUTPATIENT
Start: 2022-10-27 | End: 2023-10-26 | Stop reason: SDUPTHER

## 2022-10-27 NOTE — PROGRESS NOTES
INTERNAL MEDICINE ESTABLISHED PATIENT VISIT NOTE    Subjective:     Chief Complaint: Follow-up  HTN, DM     Patient ID: Suni Rodríguez is a 62 y.o. female with HTN, type 2 DM uncomplicated, depression c anxiety, GERD, glaucoma, nontoxic MNG (last u/s 3/2020 c mult nodules c plan to repeat u/s in 3 yrs), hx adhesive capsulitis of L shoulder, last seen by me in April for her annual, here today for 6 mo f/u HTN, DM.    Feeling well overall.  Taking all meds as rx'ed.  Today c/o rash around her mouth, present for several mos and keeps coming and going.  Unsure if related to her mask.  Hyperpigmented.  No painful or pruritic.  No significant scaling, just feels like small bumps.    Past Medical History:  Past Medical History:   Diagnosis Date    Adhesive capsulitis of left shoulder     Benign essential HTN     Depression with anxiety     GERD without esophagitis     Glaucoma     Mixed hyperlipidemia     Multinodular goiter (nontoxic)     Uncomplicated type 2 diabetes mellitus        Home Medications:  Prior to Admission medications    Medication Sig Start Date End Date Taking? Authorizing Provider   amLODIPine (NORVASC) 5 MG tablet Take 1 tablet (5 mg total) by mouth once daily. 11/15/21  Yes Jade Chin MD   atorvastatin (LIPITOR) 80 MG tablet Take 1 tablet (80 mg total) by mouth once daily. 11/15/21  Yes Jade Chin MD   dorzolamide-timolol 2-0.5% (COSOPT) 22.3-6.8 mg/mL ophthalmic solution INSTILL 1 DROP INTO BOTH EYES 2 TIMES DAILY. 3/2/17  Yes Historical Provider   latanoprost 0.005 % ophthalmic solution  4/23/17  Yes Historical Provider   lisinopriL (PRINIVIL,ZESTRIL) 40 MG tablet Take 1 tablet (40 mg total) by mouth once daily. 11/15/21  Yes Jade Chin MD   metFORMIN (GLUCOPHAGE-XR) 500 MG ER 24hr tablet Take 1 tablet (500 mg total) by mouth once daily. 11/15/21  Yes Jade Chin MD   metoprolol succinate (TOPROL-XL) 50 MG 24 hr tablet Take 1 tablet (50 mg total) by mouth once daily. 11/15/21  Yes Jade Chin MD    multivitamin (THERAGRAN) per tablet Take 1 tablet by mouth once daily.   Yes Historical Provider   sertraline (ZOLOFT) 50 MG tablet Take 1 tablet (50 mg total) by mouth once daily. 4/21/22  Yes Jade Chin MD       Allergies:  Review of patient's allergies indicates:   Allergen Reactions    Penicillins Hives       Social History:  Social History     Tobacco Use    Smoking status: Former     Packs/day: 1.00     Years: 20.00     Pack years: 20.00     Types: Cigarettes    Smokeless tobacco: Never    Tobacco comments:     quit smoking in 1996   Substance Use Topics    Alcohol use: Yes     Comment: rare use    Drug use: No        Review of Systems   Constitutional:  Negative for appetite change, chills, fatigue, fever and unexpected weight change.   HENT:  Negative for congestion, hearing loss and rhinorrhea.    Eyes:  Negative for pain and visual disturbance.   Respiratory:  Negative for cough, chest tightness, shortness of breath and wheezing.    Cardiovascular:  Negative for chest pain, palpitations and leg swelling.   Gastrointestinal:  Negative for abdominal distention and abdominal pain.   Endocrine: Negative for polydipsia and polyuria.   Genitourinary:  Negative for decreased urine volume, difficulty urinating, dysuria, hematuria and vaginal discharge.   Skin:  Positive for rash.   Neurological:  Negative for weakness, numbness and headaches.   Psychiatric/Behavioral:  Negative for behavioral problems and confusion.        Health Maintenance:     Immunizations:   Influenza 10/2021, repeat today  TDap is up to date 1/2016  Pneumovax is up to date. 2013 since DM  Shingrix completed 5/2020, 10/2020  COVID vaccines completed Pfizer 3/2/2021, 3/23/2021, 10/2021, 10/2022     Cancer Screening:  PAP: 10/2021 NILM c neg HPV c Tiffany Reynaga, rec f/u in Oct.  Mammogram: 9/2022 benign  Colonoscopy: 2/2021, polyp removed, path c/w tubular adenoma, rec repeat in 7 yrs.  DEXA:  12/2020 c osteopenia, can repeat in  "Dec.    Objective:   /82 (BP Location: Right arm, Patient Position: Sitting, BP Method: Medium (Manual))   Pulse 75   Ht 5' 4" (1.626 m)   Wt 67.6 kg (149 lb 0.5 oz)   SpO2 99%   BMI 25.58 kg/m²        General: AAO x3, no apparent distress  HEENT: PERRL, OP clear  CV: RRR, no m/r/g  Pulm: Lungs CTAB, no crackles, no wheezes  Abd: s/NT/ND +BS  Extremities: no c/c/e  Foot exam completed today.  No decreased sensation with monofilament bilaterally.  No ulcerations noted.  No calluses.  2+ distal pulses bilaterally.  Skin: hyperpigmentation noted on chin around her mouth c small papules, no scaling appreciated.      Labs:     Lab Results   Component Value Date    WBC 7.27 04/21/2022    HGB 12.8 04/21/2022    HCT 40.0 04/21/2022    MCV 91 04/21/2022     04/21/2022     Sodium   Date Value Ref Range Status   10/20/2022 139 136 - 145 mmol/L Final     Potassium   Date Value Ref Range Status   10/20/2022 4.6 3.5 - 5.1 mmol/L Final     Chloride   Date Value Ref Range Status   10/20/2022 104 95 - 110 mmol/L Final     CO2   Date Value Ref Range Status   10/20/2022 29 23 - 29 mmol/L Final     Glucose   Date Value Ref Range Status   10/20/2022 115 (H) 70 - 110 mg/dL Final     BUN   Date Value Ref Range Status   10/20/2022 9 8 - 23 mg/dL Final     Creatinine   Date Value Ref Range Status   10/20/2022 0.7 0.5 - 1.4 mg/dL Final     Calcium   Date Value Ref Range Status   10/20/2022 9.7 8.7 - 10.5 mg/dL Final     Total Protein   Date Value Ref Range Status   10/20/2022 7.3 6.0 - 8.4 g/dL Final     Albumin   Date Value Ref Range Status   10/20/2022 4.0 3.5 - 5.2 g/dL Final     Total Bilirubin   Date Value Ref Range Status   10/20/2022 0.7 0.1 - 1.0 mg/dL Final     Comment:     For infants and newborns, interpretation of results should be based  on gestational age, weight and in agreement with clinical  observations.    Premature Infant recommended reference ranges:  Up to 24 hours.............<8.0 mg/dL  Up to 48 " hours............<12.0 mg/dL  3-5 days..................<15.0 mg/dL  6-29 days.................<15.0 mg/dL       Alkaline Phosphatase   Date Value Ref Range Status   10/20/2022 128 55 - 135 U/L Final     AST   Date Value Ref Range Status   10/20/2022 22 10 - 40 U/L Final     ALT   Date Value Ref Range Status   10/20/2022 29 10 - 44 U/L Final     Anion Gap   Date Value Ref Range Status   10/20/2022 6 (L) 8 - 16 mmol/L Final     eGFR if    Date Value Ref Range Status   04/21/2022 >60.0 >60 mL/min/1.73 m^2 Final     eGFR if non    Date Value Ref Range Status   04/21/2022 >60.0 >60 mL/min/1.73 m^2 Final     Comment:     Calculation used to obtain the estimated glomerular filtration  rate (eGFR) is the CKD-EPI equation.        Lab Results   Component Value Date    HGBA1C 6.2 (H) 10/20/2022     Lab Results   Component Value Date    LDLCALC 69.6 10/20/2022     Lab Results   Component Value Date    TSH 0.618 10/21/2021         Assessment/Plan     Suni was seen today for follow-up.    Diagnoses and all orders for this visit:    Benign essential HTN  at goal.  Cont current medications.  -     amLODIPine (NORVASC) 5 MG tablet; Take 1 tablet (5 mg total) by mouth once daily.  -     lisinopriL (PRINIVIL,ZESTRIL) 40 MG tablet; Take 1 tablet (40 mg total) by mouth once daily.  -     metoprolol succinate (TOPROL-XL) 50 MG 24 hr tablet; Take 1 tablet (50 mg total) by mouth once daily.    Type 2 diabetes mellitus with hyperlipidemia  Lab Results   Component Value Date    HGBA1C 6.2 (H) 10/20/2022     Improved on recent labs, well controlled on Metformin.  Sees eye dr at [facility] q 6 mos, just seen around Sept  Foot exam done today  Urine on last check neg for microalbuminuria  -     metFORMIN (GLUCOPHAGE-XR) 500 MG ER 24hr tablet; Take 1 tablet (500 mg total) by mouth once daily.  -     CBC Auto Differential; Future  -     Comprehensive Metabolic Panel; Future  -     Hemoglobin A1C; Future  -      Microalbumin/Creatinine Ratio, Urine; Future    Mixed hyperlipidemia  Lab Results   Component Value Date    LDLCALC 69.6 10/20/2022     at goal.  Cont current medications.  -     atorvastatin (LIPITOR) 80 MG tablet; Take 1 tablet (80 mg total) by mouth once daily.  -     Lipid Panel; Future    Multinodular goiter (nontoxic)  Repeat u/s due in April, can order at f/u visit  Lab Results   Component Value Date    TSH 0.618 10/21/2021     Osteopenia of neck of left femur  Repeat DEXA due in Dec.  On D3 2000 daily  Rec wt bearing exercises as tolerated  -     DXA Bone Density Spine And Hip; Future  -     Vitamin D; Future    GERD without esophagitis  Stable, no issues    Depression with anxiety  Stable on Sertraline, ok to cont meds    Perioral dermatitis  As per HPI  Will do trial of Metrogel, rec f/u c Derm.  -     Ambulatory referral/consult to Dermatology; Future  -     metronidazole 1% (METROGEL) 1 % Gel; Apply topically once daily.    Well woman exam  -     Ambulatory referral/consult to Obstetrics / Gynecology; Future      HM as above  RTC in 6 mos for annual exam c fasting labs prior, sooner if needed.    Jade Chin MD  [facility] [address]  10/27/2022

## 2022-11-03 ENCOUNTER — OFFICE VISIT (OUTPATIENT)
Dept: DERMATOLOGY | Facility: CLINIC | Age: 62
End: 2022-11-03
Payer: COMMERCIAL

## 2022-11-03 DIAGNOSIS — E70.29: Primary | ICD-10-CM

## 2022-11-03 PROCEDURE — 99999 PR PBB SHADOW E&M-EST. PATIENT-LVL III: CPT | Mod: PBBFAC,,, | Performed by: STUDENT IN AN ORGANIZED HEALTH CARE EDUCATION/TRAINING PROGRAM

## 2022-11-03 PROCEDURE — 1160F RVW MEDS BY RX/DR IN RCRD: CPT | Mod: CPTII,S$GLB,, | Performed by: STUDENT IN AN ORGANIZED HEALTH CARE EDUCATION/TRAINING PROGRAM

## 2022-11-03 PROCEDURE — 3066F PR DOCUMENTATION OF TREATMENT FOR NEPHROPATHY: ICD-10-PCS | Mod: CPTII,S$GLB,, | Performed by: STUDENT IN AN ORGANIZED HEALTH CARE EDUCATION/TRAINING PROGRAM

## 2022-11-03 PROCEDURE — 99204 PR OFFICE/OUTPT VISIT, NEW, LEVL IV, 45-59 MIN: ICD-10-PCS | Mod: S$GLB,,, | Performed by: STUDENT IN AN ORGANIZED HEALTH CARE EDUCATION/TRAINING PROGRAM

## 2022-11-03 PROCEDURE — 99999 PR PBB SHADOW E&M-EST. PATIENT-LVL III: ICD-10-PCS | Mod: PBBFAC,,, | Performed by: STUDENT IN AN ORGANIZED HEALTH CARE EDUCATION/TRAINING PROGRAM

## 2022-11-03 PROCEDURE — 3044F HG A1C LEVEL LT 7.0%: CPT | Mod: CPTII,S$GLB,, | Performed by: STUDENT IN AN ORGANIZED HEALTH CARE EDUCATION/TRAINING PROGRAM

## 2022-11-03 PROCEDURE — 1159F MED LIST DOCD IN RCRD: CPT | Mod: CPTII,S$GLB,, | Performed by: STUDENT IN AN ORGANIZED HEALTH CARE EDUCATION/TRAINING PROGRAM

## 2022-11-03 PROCEDURE — 1160F PR REVIEW ALL MEDS BY PRESCRIBER/CLIN PHARMACIST DOCUMENTED: ICD-10-PCS | Mod: CPTII,S$GLB,, | Performed by: STUDENT IN AN ORGANIZED HEALTH CARE EDUCATION/TRAINING PROGRAM

## 2022-11-03 PROCEDURE — 4010F ACE/ARB THERAPY RXD/TAKEN: CPT | Mod: CPTII,S$GLB,, | Performed by: STUDENT IN AN ORGANIZED HEALTH CARE EDUCATION/TRAINING PROGRAM

## 2022-11-03 PROCEDURE — 3061F NEG MICROALBUMINURIA REV: CPT | Mod: CPTII,S$GLB,, | Performed by: STUDENT IN AN ORGANIZED HEALTH CARE EDUCATION/TRAINING PROGRAM

## 2022-11-03 PROCEDURE — 3044F PR MOST RECENT HEMOGLOBIN A1C LEVEL <7.0%: ICD-10-PCS | Mod: CPTII,S$GLB,, | Performed by: STUDENT IN AN ORGANIZED HEALTH CARE EDUCATION/TRAINING PROGRAM

## 2022-11-03 PROCEDURE — 99204 OFFICE O/P NEW MOD 45 MIN: CPT | Mod: S$GLB,,, | Performed by: STUDENT IN AN ORGANIZED HEALTH CARE EDUCATION/TRAINING PROGRAM

## 2022-11-03 PROCEDURE — 1159F PR MEDICATION LIST DOCUMENTED IN MEDICAL RECORD: ICD-10-PCS | Mod: CPTII,S$GLB,, | Performed by: STUDENT IN AN ORGANIZED HEALTH CARE EDUCATION/TRAINING PROGRAM

## 2022-11-03 PROCEDURE — 4010F PR ACE/ARB THEARPY RXD/TAKEN: ICD-10-PCS | Mod: CPTII,S$GLB,, | Performed by: STUDENT IN AN ORGANIZED HEALTH CARE EDUCATION/TRAINING PROGRAM

## 2022-11-03 PROCEDURE — 3066F NEPHROPATHY DOC TX: CPT | Mod: CPTII,S$GLB,, | Performed by: STUDENT IN AN ORGANIZED HEALTH CARE EDUCATION/TRAINING PROGRAM

## 2022-11-03 PROCEDURE — 3061F PR NEG MICROALBUMINURIA RESULT DOCUMENTED/REVIEW: ICD-10-PCS | Mod: CPTII,S$GLB,, | Performed by: STUDENT IN AN ORGANIZED HEALTH CARE EDUCATION/TRAINING PROGRAM

## 2022-11-03 RX ORDER — TRETINOIN 0.5 MG/G
CREAM TOPICAL NIGHTLY
Qty: 20 G | Refills: 3 | Status: SHIPPED | OUTPATIENT
Start: 2022-11-03

## 2022-11-03 RX ORDER — TACROLIMUS 1 MG/G
OINTMENT TOPICAL 2 TIMES DAILY
Qty: 100 G | Refills: 3 | Status: SHIPPED | OUTPATIENT
Start: 2022-11-03 | End: 2024-01-24

## 2022-11-03 NOTE — PROGRESS NOTES
"  Subjective:       Patient ID:  Suni Rodríguez is a 62 y.o. female who presents for   Chief Complaint   Patient presents with    Rash     Chin     Rash - Initial  Affected locations: chin  Duration: 2 years  Signs / symptoms: itching and redness (discoloration)  Aggravated by: friction  Relieving factors/Treatments tried: OTC antibiotic cream, OTC antifungals, OTC hydrocortisone and OTC moisturizer  Improvement on treatment: no relief    Hyperpigmentation on chin for years. When it first started, it was similar to acne bumps. She tried many products and feels that some of them irritated her skin and made the darkness worse. She has used many products for years to try to get rid of that are over the counter "lightening creams"    Review of Systems   Skin:  Positive for itching and rash.      Objective:    Physical Exam   Constitutional: She appears well-developed and well-nourished. No distress.   Neurological: She is alert and oriented to person, place, and time. She is not disoriented.   Psychiatric: She has a normal mood and affect.   Skin:   Areas Examined (abnormalities noted in diagram):   Head / Face Inspection Performed            Diagram Legend     Erythematous scaling macule/papule c/w actinic keratosis       Vascular papule c/w angioma      Pigmented verrucoid papule/plaque c/w seborrheic keratosis      Yellow umbilicated papule c/w sebaceous hyperplasia      Irregularly shaped tan macule c/w lentigo     1-2 mm smooth white papules consistent with Milia      Movable subcutaneous cyst with punctum c/w epidermal inclusion cyst      Subcutaneous movable cyst c/w pilar cyst      Firm pink to brown papule c/w dermatofibroma      Pedunculated fleshy papule(s) c/w skin tag(s)      Evenly pigmented macule c/w junctional nevus     Mildly variegated pigmented, slightly irregular-bordered macule c/w mildly atypical nevus      Flesh colored to evenly pigmented papule c/w intradermal nevus       Pink pearly " papule/plaque c/w basal cell carcinoma      Erythematous hyperkeratotic cursted plaque c/w SCC      Surgical scar with no sign of skin cancer recurrence      Open and closed comedones      Inflammatory papules and pustules      Verrucoid papule consistent consistent with wart     Erythematous eczematous patches and plaques     Dystrophic onycholytic nail with subungual debris c/w onychomycosis     Umbilicated papule    Erythematous-base heme-crusted tan verrucoid plaque consistent with inflamed seborrheic keratosis     Erythematous Silvery Scaling Plaque c/w Psoriasis     See annotation      Assessment / Plan:        Exogenous ochronosis  -     Ambulatory referral/consult to Dermatology  -     tacrolimus (PROTOPIC) 0.1 % ointment; Apply topically 2 (two) times daily.  Dispense: 100 g; Refill: 3  -     tretinoin (RETIN-A) 0.05 % cream; Apply topically every evening. Start 3 times weekly then increase to every night as tolerated. Pea sized amount can cover entire face  Dispense: 20 g; Refill: 3  - small papules may be syringomas or other benign cysts / neoplasms. The hyperpigmentation is possibly PIH vs exogenous ochronosis  - stop all topicals including hydroquinone containing creams  - Can consider consultation with cosmetic dermatologist to see if laser or chemical peels could help with small papules around mouth         Follow up if symptoms worsen or fail to improve.

## 2022-12-13 ENCOUNTER — HOSPITAL ENCOUNTER (OUTPATIENT)
Dept: RADIOLOGY | Facility: CLINIC | Age: 62
Discharge: HOME | End: 2022-12-13
Attending: INTERNAL MEDICINE
Payer: COMMERCIAL

## 2022-12-13 DIAGNOSIS — M85.852 OSTEOPENIA OF NECK OF LEFT FEMUR: ICD-10-CM

## 2022-12-13 PROCEDURE — 77080 DXA BONE DENSITY AXIAL: CPT | Mod: 26,,, | Performed by: INTERNAL MEDICINE

## 2022-12-13 PROCEDURE — 77080 DXA BONE DENSITY AXIAL: CPT | Mod: TC

## 2022-12-13 PROCEDURE — 77080 DEXA BONE DENSITY SPINE HIP: ICD-10-PCS | Mod: 26,,, | Performed by: INTERNAL MEDICINE

## 2022-12-15 ENCOUNTER — OFFICE VISIT (OUTPATIENT)
Dept: OBSTETRICS AND GYNECOLOGY | Facility: CLINIC | Age: 62
End: 2022-12-15
Payer: COMMERCIAL

## 2022-12-15 VITALS
SYSTOLIC BLOOD PRESSURE: 114 MMHG | BODY MASS INDEX: 25.16 KG/M2 | HEIGHT: 64 IN | DIASTOLIC BLOOD PRESSURE: 78 MMHG | WEIGHT: 147.38 LBS

## 2022-12-15 DIAGNOSIS — N95.1 VAGINAL DRYNESS, MENOPAUSAL: ICD-10-CM

## 2022-12-15 DIAGNOSIS — Z01.419 WELL WOMAN EXAM: Primary | ICD-10-CM

## 2022-12-15 DIAGNOSIS — N95.2 VAGINAL ATROPHY: ICD-10-CM

## 2022-12-15 PROCEDURE — 1159F MED LIST DOCD IN RCRD: CPT | Mod: CPTII,S$GLB,,

## 2022-12-15 PROCEDURE — 3078F PR MOST RECENT DIASTOLIC BLOOD PRESSURE < 80 MM HG: ICD-10-PCS | Mod: CPTII,S$GLB,,

## 2022-12-15 PROCEDURE — 3066F NEPHROPATHY DOC TX: CPT | Mod: CPTII,S$GLB,,

## 2022-12-15 PROCEDURE — 1160F PR REVIEW ALL MEDS BY PRESCRIBER/CLIN PHARMACIST DOCUMENTED: ICD-10-PCS | Mod: CPTII,S$GLB,,

## 2022-12-15 PROCEDURE — 1160F RVW MEDS BY RX/DR IN RCRD: CPT | Mod: CPTII,S$GLB,,

## 2022-12-15 PROCEDURE — 3066F PR DOCUMENTATION OF TREATMENT FOR NEPHROPATHY: ICD-10-PCS | Mod: CPTII,S$GLB,,

## 2022-12-15 PROCEDURE — 3061F PR NEG MICROALBUMINURIA RESULT DOCUMENTED/REVIEW: ICD-10-PCS | Mod: CPTII,S$GLB,,

## 2022-12-15 PROCEDURE — 3044F PR MOST RECENT HEMOGLOBIN A1C LEVEL <7.0%: ICD-10-PCS | Mod: CPTII,S$GLB,,

## 2022-12-15 PROCEDURE — 3061F NEG MICROALBUMINURIA REV: CPT | Mod: CPTII,S$GLB,,

## 2022-12-15 PROCEDURE — 3078F DIAST BP <80 MM HG: CPT | Mod: CPTII,S$GLB,,

## 2022-12-15 PROCEDURE — 99999 PR PBB SHADOW E&M-EST. PATIENT-LVL IV: ICD-10-PCS | Mod: PBBFAC,,,

## 2022-12-15 PROCEDURE — 3044F HG A1C LEVEL LT 7.0%: CPT | Mod: CPTII,S$GLB,,

## 2022-12-15 PROCEDURE — 99396 PR PREVENTIVE VISIT,EST,40-64: ICD-10-PCS | Mod: S$GLB,,,

## 2022-12-15 PROCEDURE — 99999 PR PBB SHADOW E&M-EST. PATIENT-LVL IV: CPT | Mod: PBBFAC,,,

## 2022-12-15 PROCEDURE — 4010F PR ACE/ARB THEARPY RXD/TAKEN: ICD-10-PCS | Mod: CPTII,S$GLB,,

## 2022-12-15 PROCEDURE — 1159F PR MEDICATION LIST DOCUMENTED IN MEDICAL RECORD: ICD-10-PCS | Mod: CPTII,S$GLB,,

## 2022-12-15 PROCEDURE — 3008F BODY MASS INDEX DOCD: CPT | Mod: CPTII,S$GLB,,

## 2022-12-15 PROCEDURE — 3008F PR BODY MASS INDEX (BMI) DOCUMENTED: ICD-10-PCS | Mod: CPTII,S$GLB,,

## 2022-12-15 PROCEDURE — 3074F SYST BP LT 130 MM HG: CPT | Mod: CPTII,S$GLB,,

## 2022-12-15 PROCEDURE — 99396 PREV VISIT EST AGE 40-64: CPT | Mod: S$GLB,,,

## 2022-12-15 PROCEDURE — 4010F ACE/ARB THERAPY RXD/TAKEN: CPT | Mod: CPTII,S$GLB,,

## 2022-12-15 PROCEDURE — 3074F PR MOST RECENT SYSTOLIC BLOOD PRESSURE < 130 MM HG: ICD-10-PCS | Mod: CPTII,S$GLB,,

## 2022-12-15 NOTE — PROGRESS NOTES
CC: Annual  HPI: Pt is a 62 y.o.  female who presents for routine annual exam. She is postmenopausal. Denies vaginal bleeding. Reports vaginal dryness that makes intercourse painful. Reports using lubricants with little success.  The patient participates in regular exercise: no.  The patient does not smoke.  The patient wears seatbelts.   Pt denies any domestic violence.    History of abnormal pap: No  Last pap: 10/18/21-NILM, HPV-  Last MM22-BIRADS 1  Last colonoscopy:   Last DXA: 22-osteopenia     FH:  Breast cancer: daughter  Colon cancer: none  Ovarian cancer: none  Endometrial cancer: none    ROS:  GENERAL: Feeling well overall. Denies fever or chills.   SKIN: Denies rash or lesions.   HEAD: Denies head injury or headache.   NODES: Denies enlarged lymph nodes.   CHEST: Denies chest pain or shortness of breath.   CARDIOVASCULAR: Denies palpitations or left sided chest pain.   ABDOMEN: No abdominal pain, constipation, diarrhea, nausea, vomiting or rectal bleeding.   URINARY: No dysuria, hematuria, or burning on urination.  REPRODUCTIVE: See HPI.   BREASTS: Denies pain, lumps, or nipple discharge.   HEMATOLOGIC: No easy bruisability or excessive bleeding.   MUSCULOSKELETAL: Denies joint pain or swelling.   NEUROLOGIC: Denies syncope or weakness.   PSYCHIATRIC: Denies depression, anxiety or mood swings.    PE:   APPEARANCE: Well nourished, well developed, Black or  female in no acute distress.  NODES: no cervical, supraclavicular, or inguinal lymphadenopathy  BREASTS: Symmetrical, no skin changes or visible lesions. No palpable masses, nipple discharge or adenopathy bilaterally.  ABDOMEN: Soft. No tenderness or masses. No distention. No hernias palpated. No CVA tenderness.  VULVA: No lesions. Normal external female genitalia.  URETHRAL MEATUS: Normal size and location, no lesions, no prolapse.  URETHRA: No masses, tenderness, or prolapse.  VAGINA: Moist. No lesions or lacerations  noted. No abnormal discharge present. No odor present. + atrophy  CERVIX: No lesions or discharge. No cervical motion tenderness.   UTERUS: Normal size, regular shape, mobile, non-tender.  ADNEXA: No tenderness. No fullness or masses palpated in the adnexal regions.   ANUS PERINEUM: Normal.      Diagnosis:  1. Well woman exam    2. Vaginal atrophy    3. Vaginal dryness, menopausal        Plan:   Pap UTD-due 10/18/26  MMG UTD-due 9/29/23  Colonoscopy UTD-due in 6 years  DXA UTD-osteopenia, repeat 2-3 years    Recommend a calcium intake of a total of 1200 mg daily (dietary preferred) along with vitamin D of at least 1000-2000IU daily to support bone health.      Counseled on risk/benefits of vaginal estrogen cream. Recommended to use every night for 2 weeks, then twice a week thereafter.     Orders Placed This Encounter    conjugated estrogens (PREMARIN) vaginal cream       Patient was counseled today on the new ACS guidelines for cervical cytology screening as well as the current recommendations for breast cancer screening. She was counseled to follow up with her PCP for other routine health maintenance. Counseling session lasted approximately 10 minutes, and all her questions were answered.    Follow-up with me in 1 year for routine exam       DEL Esparza

## 2022-12-19 DIAGNOSIS — I10 BENIGN ESSENTIAL HTN: ICD-10-CM

## 2022-12-19 NOTE — TELEPHONE ENCOUNTER
No new care gaps identified.  [facility] Embedded Care Gaps. Reference number: [id number]. 12/19/2022   11:12:26 AM CST   [facility]  Hepatology Service  Progress Note    Patient Name: Madhavi Bell  MRN: [id number]  Admission Date: 10/30/2020  Hospital Length of Stay: 1 days  Code Status: Full Code   Principal Problem:Decompensated hepatic cirrhosis      Subjective:     Interval history:   When for liver biopsy and thoracentesis yesterday.  1.7 L removed.  On Levophed for HRS, creatinine up to 4.  Sodium up 123 to 125.  This morning, patient is still short of breath, slightly better than yesterday.    Objective:     Vitals:    10/31/20 1200   BP: (!) 90/52   Pulse: 91   Resp: (!) 23   Temp:        General: Alert and Oriented, moderate respiratory distress.  On high-flow nasal cannula  HEENT:  Positive scleral icterus.  Resp:  Increased work of breathing  Cardiac: S1 and S2 normal  Abdomen: Normoactive bowel sounds. Non-distended. Soft. Non-tender.  Extremities: No peripheral edema.   Neurologic: No gross neurological Deficits  Psych: Calm, cooperative. Normal mood and affect.    Significant Labs:  Recent Labs   Lab 10/30/20  1340 10/31/20  0453 10/31/20  0916   HGB 11.2* 9.3* 9.4*       Lab Results   Component Value Date    WBC 17.29 (H) 10/31/2020    HGB 9.4 (L) 10/31/2020    HCT 27.6 (L) 10/31/2020    MCV 93 10/31/2020     (L) 10/31/2020       Lab Results   Component Value Date     (L) 10/31/2020    K 4.7 10/31/2020    CL 90 (L) 10/31/2020    CO2 22 (L) 10/31/2020    BUN 55 (H) 10/31/2020    CREATININE 4.0 (H) 10/31/2020    CALCIUM 8.1 (L) 10/31/2020    ANIONGAP 13 10/31/2020    ESTGFRAFRICA 14.6 (A) 10/31/2020    EGFRNONAA 12.7 (A) 10/31/2020       Lab Results   Component Value Date    ALT 58 (H) 10/31/2020    AST 80 (H) 10/31/2020     (H) 10/08/2020    ALKPHOS 563 (H) 10/31/2020    BILITOT 25.0 (H) 10/31/2020       Lab Results   Component Value Date    INR 3.5 (H) 10/31/2020    INR 2.4 (H) 10/30/2020    INR 2.6 (H) 10/29/2020       Significant Imaging:  Reviewed pertinent radiology  findings.       Assessment/Plan:     Madhavi Bell is a 44 y.o. female with history of decompensated liver cirrhosis due to alpha-1 antitrypsin deficiency and alcohol, recurrent pleural effusion, HCC (with the additional bilobar indeterminate liver lesions) who presented to [place] with shortness of breath and transferred to [facility] for further evaluation.     Problem List:  1. Decompensated liver cirrhosis due to alpha-1 antitrypsin deficiency and alcohol  2. Hepatocellular carcinoma - 3.4 cm right lesion with additional indeterminate foci.  Single lesion alone will would make patient a transplant candidate, but discussed that IR conference and will need biopsy of 1 of the other lesions to make sure she is still a transplant candidate  3. Recurrent hepatic hydrothorax  4. Acute renal failure / HRS  5. Hyponatremia     Plan:  - HCC:  Pending path from one of the indeterminate liver lesions.  - Hepatic hydrothorax: CXR on presentation with right lung opacification.  Thoracentesis -1.7L 10/30.  2 gram Na restrict.  Hold diuretics given hyponatremia and renal failure.  Consider repeat thoracentesis today for continued increased work of breathing.  Recommend against chest tube as hepatic hydrothorax will continuously drain and lead to significant dehydration/electrolyte abnormalities.  - LINN/HRS:  Nephrology following.  On Levophed, octreotide, albumin.  Also on midodrine  - Hyponatremia:  Hold diuretics, fluid restrict.  Trial of IV albumin.  - Hypotension:  Brought infectious workup, empiric antibiotics, low threshold for adding fungal coverage  - Esophageal varices: last EGD 9/9 with small esophageal varices  - Hepatic encephalopathy: none  - Transplant:  Candidacy will depend on results liver biopsy.  Has completed remaining workup.  Will present to committee after biopsy results are obtained.    Thank you for involving us in the care of Madhavi Bell. Please call with any additional questions, concerns  or changes in the patient's clinical status.    Shane Ross MD  Gastroenterology Fellow PGY IV   [facility]

## 2022-12-20 RX ORDER — LISINOPRIL 40 MG/1
40 TABLET ORAL DAILY
Qty: 90 TABLET | Refills: 3 | Status: SHIPPED | OUTPATIENT
Start: 2022-12-20 | End: 2023-10-26 | Stop reason: SDUPTHER

## 2022-12-20 NOTE — TELEPHONE ENCOUNTER
Refill Decision Note   Suni Rodríguez  is requesting a refill authorization.  Brief Assessment and Rationale for Refill:  Approve     Medication Therapy Plan:       Medication Reconciliation Completed: No   Comments:     No Care Gaps recommended.     Note composed:10:14 AM 12/20/2022

## 2022-12-21 ENCOUNTER — TELEPHONE (OUTPATIENT)
Dept: OBSTETRICS AND GYNECOLOGY | Facility: CLINIC | Age: 62
End: 2022-12-21
Payer: COMMERCIAL

## 2022-12-21 DIAGNOSIS — N95.2 POSTMENOPAUSAL ATROPHIC VAGINITIS: Primary | ICD-10-CM

## 2022-12-21 NOTE — TELEPHONE ENCOUNTER
Spoke with pt. Informed her that Rx script for Introssa was sent over to pharmacy for . Pt verbalized understanding.  Sent pt my chart message.  ----- Message from Sylvia Tay sent at 12/21/2022 10:45 AM CST -----  Contact: Patient @ portal  Good Morning  Patient is calling due to price her last Rx was not able to be bought. Patient would like other Rx    Please call and advise

## 2022-12-21 NOTE — TELEPHONE ENCOUNTER
I sent in intrarosa. She may be able to print off a coupon on their web site and bring it to the pharmacy with her. This is inserted vaginally every night.

## 2023-04-03 ENCOUNTER — PATIENT MESSAGE (OUTPATIENT)
Dept: ADMINISTRATIVE | Facility: HOSPITAL | Age: 63
End: 2023-04-03
Payer: COMMERCIAL

## 2023-04-20 ENCOUNTER — LAB VISIT (OUTPATIENT)
Dept: LAB | Facility: HOSPITAL | Age: 63
End: 2023-04-20
Attending: INTERNAL MEDICINE
Payer: COMMERCIAL

## 2023-04-20 DIAGNOSIS — E11.69 TYPE 2 DIABETES MELLITUS WITH HYPERLIPIDEMIA: ICD-10-CM

## 2023-04-20 DIAGNOSIS — E78.2 MIXED HYPERLIPIDEMIA: ICD-10-CM

## 2023-04-20 DIAGNOSIS — M85.852 OSTEOPENIA OF NECK OF LEFT FEMUR: ICD-10-CM

## 2023-04-20 DIAGNOSIS — E78.5 TYPE 2 DIABETES MELLITUS WITH HYPERLIPIDEMIA: ICD-10-CM

## 2023-04-20 LAB
25(OH)D3+25(OH)D2 SERPL-MCNC: 66 NG/ML (ref 30–96)
ALBUMIN SERPL BCP-MCNC: 4.1 G/DL (ref 3.5–5.2)
ALP SERPL-CCNC: 107 U/L (ref 55–135)
ALT SERPL W/O P-5'-P-CCNC: 33 U/L (ref 10–44)
ANION GAP SERPL CALC-SCNC: 8 MMOL/L (ref 8–16)
AST SERPL-CCNC: 22 U/L (ref 10–40)
BASOPHILS # BLD AUTO: 0.03 K/UL (ref 0–0.2)
BASOPHILS NFR BLD: 0.3 % (ref 0–1.9)
BILIRUB SERPL-MCNC: 0.5 MG/DL (ref 0.1–1)
BUN SERPL-MCNC: 10 MG/DL (ref 8–23)
CALCIUM SERPL-MCNC: 9.6 MG/DL (ref 8.7–10.5)
CHLORIDE SERPL-SCNC: 105 MMOL/L (ref 95–110)
CHOLEST SERPL-MCNC: 109 MG/DL (ref 120–199)
CHOLEST/HDLC SERPL: 2.7 {RATIO} (ref 2–5)
CO2 SERPL-SCNC: 28 MMOL/L (ref 23–29)
CREAT SERPL-MCNC: 0.8 MG/DL (ref 0.5–1.4)
DIFFERENTIAL METHOD: ABNORMAL
EOSINOPHIL # BLD AUTO: 0 K/UL (ref 0–0.5)
EOSINOPHIL NFR BLD: 0.4 % (ref 0–8)
ERYTHROCYTE [DISTWIDTH] IN BLOOD BY AUTOMATED COUNT: 12.5 % (ref 11.5–14.5)
EST. GFR  (NO RACE VARIABLE): >60 ML/MIN/1.73 M^2
ESTIMATED AVG GLUCOSE: 134 MG/DL (ref 68–131)
GLUCOSE SERPL-MCNC: 94 MG/DL (ref 70–110)
HBA1C MFR BLD: 6.3 % (ref 4–5.6)
HCT VFR BLD AUTO: 43.7 % (ref 37–48.5)
HDLC SERPL-MCNC: 41 MG/DL (ref 40–75)
HDLC SERPL: 37.6 % (ref 20–50)
HGB BLD-MCNC: 13.5 G/DL (ref 12–16)
IMM GRANULOCYTES # BLD AUTO: 0.02 K/UL (ref 0–0.04)
IMM GRANULOCYTES NFR BLD AUTO: 0.2 % (ref 0–0.5)
LDLC SERPL CALC-MCNC: 51.2 MG/DL (ref 63–159)
LYMPHOCYTES # BLD AUTO: 2.9 K/UL (ref 1–4.8)
LYMPHOCYTES NFR BLD: 32.3 % (ref 18–48)
MCH RBC QN AUTO: 28.4 PG (ref 27–31)
MCHC RBC AUTO-ENTMCNC: 30.9 G/DL (ref 32–36)
MCV RBC AUTO: 92 FL (ref 82–98)
MONOCYTES # BLD AUTO: 0.6 K/UL (ref 0.3–1)
MONOCYTES NFR BLD: 6.3 % (ref 4–15)
NEUTROPHILS # BLD AUTO: 5.5 K/UL (ref 1.8–7.7)
NEUTROPHILS NFR BLD: 60.5 % (ref 38–73)
NONHDLC SERPL-MCNC: 68 MG/DL
NRBC BLD-RTO: 0 /100 WBC
PLATELET # BLD AUTO: 277 K/UL (ref 150–450)
PMV BLD AUTO: 12.4 FL (ref 9.2–12.9)
POTASSIUM SERPL-SCNC: 4.4 MMOL/L (ref 3.5–5.1)
PROT SERPL-MCNC: 7.1 G/DL (ref 6–8.4)
RBC # BLD AUTO: 4.75 M/UL (ref 4–5.4)
SODIUM SERPL-SCNC: 141 MMOL/L (ref 136–145)
TRIGL SERPL-MCNC: 84 MG/DL (ref 30–150)
WBC # BLD AUTO: 9.04 K/UL (ref 3.9–12.7)

## 2023-04-20 PROCEDURE — 83036 HEMOGLOBIN GLYCOSYLATED A1C: CPT | Performed by: INTERNAL MEDICINE

## 2023-04-20 PROCEDURE — 80061 LIPID PANEL: CPT | Performed by: INTERNAL MEDICINE

## 2023-04-20 PROCEDURE — 80053 COMPREHEN METABOLIC PANEL: CPT | Performed by: INTERNAL MEDICINE

## 2023-04-20 PROCEDURE — 36415 COLL VENOUS BLD VENIPUNCTURE: CPT | Performed by: INTERNAL MEDICINE

## 2023-04-20 PROCEDURE — 82306 VITAMIN D 25 HYDROXY: CPT | Performed by: INTERNAL MEDICINE

## 2023-04-20 PROCEDURE — 85025 COMPLETE CBC W/AUTO DIFF WBC: CPT | Performed by: INTERNAL MEDICINE

## 2023-04-26 LAB
LEFT EYE DM RETINOPATHY: NEGATIVE
RIGHT EYE DM RETINOPATHY: NEGATIVE

## 2023-04-27 ENCOUNTER — PATIENT OUTREACH (OUTPATIENT)
Dept: ADMINISTRATIVE | Facility: HOSPITAL | Age: 63
End: 2023-04-27
Payer: COMMERCIAL

## 2023-04-27 ENCOUNTER — OFFICE VISIT (OUTPATIENT)
Dept: INTERNAL MEDICINE | Facility: CLINIC | Age: 63
End: 2023-04-27
Payer: COMMERCIAL

## 2023-04-27 VITALS
DIASTOLIC BLOOD PRESSURE: 74 MMHG | WEIGHT: 145.31 LBS | OXYGEN SATURATION: 97 % | HEIGHT: 64 IN | BODY MASS INDEX: 24.81 KG/M2 | HEART RATE: 55 BPM | SYSTOLIC BLOOD PRESSURE: 128 MMHG

## 2023-04-27 DIAGNOSIS — E78.2 MIXED HYPERLIPIDEMIA: ICD-10-CM

## 2023-04-27 DIAGNOSIS — E04.2 MULTINODULAR GOITER (NONTOXIC): ICD-10-CM

## 2023-04-27 DIAGNOSIS — Z12.31 SCREENING MAMMOGRAM, ENCOUNTER FOR: ICD-10-CM

## 2023-04-27 DIAGNOSIS — E78.5 TYPE 2 DIABETES MELLITUS WITH HYPERLIPIDEMIA: ICD-10-CM

## 2023-04-27 DIAGNOSIS — E11.69 TYPE 2 DIABETES MELLITUS WITH HYPERLIPIDEMIA: ICD-10-CM

## 2023-04-27 DIAGNOSIS — I10 BENIGN ESSENTIAL HTN: ICD-10-CM

## 2023-04-27 DIAGNOSIS — Z00.00 VISIT FOR ANNUAL HEALTH EXAMINATION: Primary | ICD-10-CM

## 2023-04-27 DIAGNOSIS — E70.29: ICD-10-CM

## 2023-04-27 DIAGNOSIS — M85.852 OSTEOPENIA OF NECK OF LEFT FEMUR: ICD-10-CM

## 2023-04-27 DIAGNOSIS — F41.8 DEPRESSION WITH ANXIETY: ICD-10-CM

## 2023-04-27 PROCEDURE — 3066F PR DOCUMENTATION OF TREATMENT FOR NEPHROPATHY: ICD-10-PCS | Mod: CPTII,S$GLB,, | Performed by: INTERNAL MEDICINE

## 2023-04-27 PROCEDURE — 3074F SYST BP LT 130 MM HG: CPT | Mod: CPTII,S$GLB,, | Performed by: INTERNAL MEDICINE

## 2023-04-27 PROCEDURE — 1159F MED LIST DOCD IN RCRD: CPT | Mod: CPTII,S$GLB,, | Performed by: INTERNAL MEDICINE

## 2023-04-27 PROCEDURE — 3044F PR MOST RECENT HEMOGLOBIN A1C LEVEL <7.0%: ICD-10-PCS | Mod: CPTII,S$GLB,, | Performed by: INTERNAL MEDICINE

## 2023-04-27 PROCEDURE — 1160F RVW MEDS BY RX/DR IN RCRD: CPT | Mod: CPTII,S$GLB,, | Performed by: INTERNAL MEDICINE

## 2023-04-27 PROCEDURE — 3078F DIAST BP <80 MM HG: CPT | Mod: CPTII,S$GLB,, | Performed by: INTERNAL MEDICINE

## 2023-04-27 PROCEDURE — 3074F PR MOST RECENT SYSTOLIC BLOOD PRESSURE < 130 MM HG: ICD-10-PCS | Mod: CPTII,S$GLB,, | Performed by: INTERNAL MEDICINE

## 2023-04-27 PROCEDURE — 99999 PR PBB SHADOW E&M-EST. PATIENT-LVL V: CPT | Mod: PBBFAC,,, | Performed by: INTERNAL MEDICINE

## 2023-04-27 PROCEDURE — 3061F NEG MICROALBUMINURIA REV: CPT | Mod: CPTII,S$GLB,, | Performed by: INTERNAL MEDICINE

## 2023-04-27 PROCEDURE — 3044F HG A1C LEVEL LT 7.0%: CPT | Mod: CPTII,S$GLB,, | Performed by: INTERNAL MEDICINE

## 2023-04-27 PROCEDURE — 3078F PR MOST RECENT DIASTOLIC BLOOD PRESSURE < 80 MM HG: ICD-10-PCS | Mod: CPTII,S$GLB,, | Performed by: INTERNAL MEDICINE

## 2023-04-27 PROCEDURE — 1159F PR MEDICATION LIST DOCUMENTED IN MEDICAL RECORD: ICD-10-PCS | Mod: CPTII,S$GLB,, | Performed by: INTERNAL MEDICINE

## 2023-04-27 PROCEDURE — 3008F PR BODY MASS INDEX (BMI) DOCUMENTED: ICD-10-PCS | Mod: CPTII,S$GLB,, | Performed by: INTERNAL MEDICINE

## 2023-04-27 PROCEDURE — 4010F PR ACE/ARB THEARPY RXD/TAKEN: ICD-10-PCS | Mod: CPTII,S$GLB,, | Performed by: INTERNAL MEDICINE

## 2023-04-27 PROCEDURE — 4010F ACE/ARB THERAPY RXD/TAKEN: CPT | Mod: CPTII,S$GLB,, | Performed by: INTERNAL MEDICINE

## 2023-04-27 PROCEDURE — 3066F NEPHROPATHY DOC TX: CPT | Mod: CPTII,S$GLB,, | Performed by: INTERNAL MEDICINE

## 2023-04-27 PROCEDURE — 99396 PR PREVENTIVE VISIT,EST,40-64: ICD-10-PCS | Mod: S$GLB,,, | Performed by: INTERNAL MEDICINE

## 2023-04-27 PROCEDURE — 99396 PREV VISIT EST AGE 40-64: CPT | Mod: S$GLB,,, | Performed by: INTERNAL MEDICINE

## 2023-04-27 PROCEDURE — 99999 PR PBB SHADOW E&M-EST. PATIENT-LVL V: ICD-10-PCS | Mod: PBBFAC,,, | Performed by: INTERNAL MEDICINE

## 2023-04-27 PROCEDURE — 1160F PR REVIEW ALL MEDS BY PRESCRIBER/CLIN PHARMACIST DOCUMENTED: ICD-10-PCS | Mod: CPTII,S$GLB,, | Performed by: INTERNAL MEDICINE

## 2023-04-27 PROCEDURE — 3008F BODY MASS INDEX DOCD: CPT | Mod: CPTII,S$GLB,, | Performed by: INTERNAL MEDICINE

## 2023-04-27 PROCEDURE — 3061F PR NEG MICROALBUMINURIA RESULT DOCUMENTED/REVIEW: ICD-10-PCS | Mod: CPTII,S$GLB,, | Performed by: INTERNAL MEDICINE

## 2023-04-27 NOTE — PROGRESS NOTES
There are no preventive care reminders to display for this patient.    Triggered LINKS. Updated Care Everywhere. Record request sent to Dr. Ailin Heard asking for a copy of pt's most recent eye exam results. Chart review completed.

## 2023-04-27 NOTE — Clinical Note
Christian Price Ms. Marcos saw Dr. Heard at [facility] for her eye exam yesterday, note in Care Everywhere. Best, Dr. Chin

## 2023-04-27 NOTE — PROGRESS NOTES
INTERNAL MEDICINE ESTABLISHED PATIENT VISIT NOTE    Subjective:     Chief Complaint: Annual Exam       Patient ID: Suni Rodríguez is a 62 y.o. female with HTN, type 2 DM uncomplicated, depression c anxiety, GERD, glaucoma, nontoxic MNG (last u/s 3/2020 c mult nodules c plan to repeat u/s in 3 yrs), hx adhesive capsulitis of L shoulder, last seen by me 10/2022, here today for annual exam and f/u lab results.    Was seen by derm in Nov for a rash around her mouth.  Told exogenous ochronis and rx'ed topical tacrolimus and tretinoin.    Seen by Dr. Heard at [facility] for her eye exam yesterday and goes back on June.    Feels like mood is overall good on Sertraline but sometimes feels like she over-thinks and over-analyzes certain situations.    Past Medical History:  Past Medical History:   Diagnosis Date    Adhesive capsulitis of left shoulder     Benign essential HTN     Depression with anxiety     GERD without esophagitis     Glaucoma     Mixed hyperlipidemia     Multinodular goiter (nontoxic)     Uncomplicated type 2 diabetes mellitus        Home Medications:  Prior to Admission medications    Medication Sig Start Date End Date Taking? Authorizing Provider   amLODIPine (NORVASC) 5 MG tablet Take 1 tablet (5 mg total) by mouth once daily. 10/27/22   Jade Chin MD   atorvastatin (LIPITOR) 80 MG tablet Take 1 tablet (80 mg total) by mouth once daily. 10/27/22   Jade Chin MD   dorzolamide-timolol 2-0.5% (COSOPT) 22.3-6.8 mg/mL ophthalmic solution INSTILL 1 DROP INTO BOTH EYES 2 TIMES DAILY. 3/2/17   Historical Provider   latanoprost 0.005 % ophthalmic solution  4/23/17   Historical Provider   lisinopriL (PRINIVIL,ZESTRIL) 40 MG tablet Take 1 tablet (40 mg total) by mouth once daily. 12/20/22   Jade Chin MD   metFORMIN (GLUCOPHAGE-XR) 500 MG ER 24hr tablet Take 1 tablet (500 mg total) by mouth once daily. 10/27/22   Jade Chin MD   metoprolol succinate (TOPROL-XL) 50 MG 24 hr tablet Take 1 tablet (50 mg total) by  mouth once daily. 10/27/22   Jade Chin MD   metronidazole 1% (METROGEL) 1 % Gel APPLY TO AFFECTED AREA EVERY DAY 23   Jade Chin MD   multivitamin (THERAGRAN) per tablet Take 1 tablet by mouth once daily.    Historical Provider   prasterone, dhea, (INTRAROSA) 6.5 mg Inst Place 6.5 mg vaginally every evening. 22   Magda Ramos, NP   sertraline (ZOLOFT) 50 MG tablet TAKE 1 TABLET BY MOUTH EVERY DAY 23   Jade Chin MD   tacrolimus (PROTOPIC) 0.1 % ointment Apply topically 2 (two) times daily. 11/3/22   Phil Vincent MD   tretinoin (RETIN-A) 0.05 % cream Apply topically every evening. Start 3 times weekly then increase to every night as tolerated. Pea sized amount can cover entire face 11/3/22   Phil Vincent MD       Allergies:  Review of patient's allergies indicates:   Allergen Reactions    Penicillins Hives       Social History:  Social History     Tobacco Use    Smoking status: Former     Packs/day: 1.00     Years: 23.00     Pack years: 23.00     Types: Cigarettes     Start date: 1973     Quit date: 1996     Years since quittin.3     Passive exposure: Never    Smokeless tobacco: Never    Tobacco comments:     quit smoking in    Substance Use Topics    Alcohol use: Yes     Alcohol/week: 2.0 standard drinks     Types: 2 Glasses of wine per week     Comment: Sometimes special occasions, holidays only    Drug use: No        Review of Systems   Constitutional:  Negative for appetite change, chills, fatigue, fever and unexpected weight change.   HENT:  Negative for congestion, hearing loss and rhinorrhea.    Eyes:  Negative for pain and visual disturbance.   Respiratory:  Negative for cough, chest tightness, shortness of breath and wheezing.    Cardiovascular:  Negative for chest pain, palpitations and leg swelling.   Gastrointestinal:  Negative for abdominal distention and abdominal pain.   Endocrine: Negative for polydipsia and polyuria.   Genitourinary:  Negative for  "decreased urine volume, difficulty urinating, dysuria, hematuria and vaginal discharge.   Neurological:  Negative for weakness, numbness and headaches.   Psychiatric/Behavioral:  Negative for behavioral problems and confusion.        Health Maintenance:     Immunizations:   Influenza 10/2022  TDap is up to date 1/2016  Pneumovax is up to date. 2013 since DM, rec Prevnar 20 in Aug.  Shingrix completed 5/2020, 10/2020  COVID vaccines completed Pfizer 3/2/2021, 3/23/2021, 10/2021, 10/2022     Cancer Screening:  PAP: 10/2021 NILM c neg HPV c Tiffany Reynaga, WWE done 12/2022 c NP Francheska Barker  Mammogram: 9/2022 benign, can order repeat for Sept.  Colonoscopy: 2/2021, polyp removed, path c/w tubular adenoma, rec repeat in 7 yrs.  DEXA:  12/2022 c osteopenia, improved at L spine and stable at hip     Objective:   /74 (BP Location: Left arm, Patient Position: Sitting, BP Method: Medium (Manual))   Pulse (!) 55   Ht 5' 4" (1.626 m)   Wt 65.9 kg (145 lb 4.5 oz)   SpO2 97%   BMI 24.94 kg/m²        General: AAO x3, no apparent distress  HEENT: PERRL, OP clear  CV: RRR, no m/r/g  Pulm: Lungs CTAB, no crackles, no wheezes  Abd: s/NT/ND +BS  Extremities: no c/c/e  Foot exam completed today.  No decreased sensation with monofilament bilaterally.  No ulcerations noted.  No calluses.  2+ distal pulses bilaterally.      Labs:     Lab Results   Component Value Date    WBC 9.04 04/20/2023    HGB 13.5 04/20/2023    HCT 43.7 04/20/2023    MCV 92 04/20/2023     04/20/2023     Sodium   Date Value Ref Range Status   04/20/2023 141 136 - 145 mmol/L Final     Potassium   Date Value Ref Range Status   04/20/2023 4.4 3.5 - 5.1 mmol/L Final     Chloride   Date Value Ref Range Status   04/20/2023 105 95 - 110 mmol/L Final     CO2   Date Value Ref Range Status   04/20/2023 28 23 - 29 mmol/L Final     Glucose   Date Value Ref Range Status   04/20/2023 94 70 - 110 mg/dL Final     BUN   Date Value Ref Range Status "   04/20/2023 10 8 - 23 mg/dL Final     Creatinine   Date Value Ref Range Status   04/20/2023 0.8 0.5 - 1.4 mg/dL Final     Calcium   Date Value Ref Range Status   04/20/2023 9.6 8.7 - 10.5 mg/dL Final     Total Protein   Date Value Ref Range Status   04/20/2023 7.1 6.0 - 8.4 g/dL Final     Albumin   Date Value Ref Range Status   04/20/2023 4.1 3.5 - 5.2 g/dL Final     Total Bilirubin   Date Value Ref Range Status   04/20/2023 0.5 0.1 - 1.0 mg/dL Final     Comment:     For infants and newborns, interpretation of results should be based  on gestational age, weight and in agreement with clinical  observations.    Premature Infant recommended reference ranges:  Up to 24 hours.............<8.0 mg/dL  Up to 48 hours............<12.0 mg/dL  3-5 days..................<15.0 mg/dL  6-29 days.................<15.0 mg/dL       Alkaline Phosphatase   Date Value Ref Range Status   04/20/2023 107 55 - 135 U/L Final     AST   Date Value Ref Range Status   04/20/2023 22 10 - 40 U/L Final     ALT   Date Value Ref Range Status   04/20/2023 33 10 - 44 U/L Final     Anion Gap   Date Value Ref Range Status   04/20/2023 8 8 - 16 mmol/L Final     eGFR if    Date Value Ref Range Status   04/21/2022 >60.0 >60 mL/min/1.73 m^2 Final     eGFR if non    Date Value Ref Range Status   04/21/2022 >60.0 >60 mL/min/1.73 m^2 Final     Comment:     Calculation used to obtain the estimated glomerular filtration  rate (eGFR) is the CKD-EPI equation.        Lab Results   Component Value Date    HGBA1C 6.3 (H) 04/20/2023     Lab Results   Component Value Date    LDLCALC 51.2 (L) 04/20/2023     Lab Results   Component Value Date    TSH 0.618 10/21/2021         Assessment/Plan     Suni was seen today for annual exam.    Diagnoses and all orders for this visit:    Visit for annual health examination  History and physical exam completed.  Health maintenance reviewed as above.    Type 2 diabetes mellitus with  hyperlipidemia  Lab Results   Component Value Date    HGBA1C 6.3 (H) 04/20/2023     Well controlled on Metformin, cont meds  -     Comprehensive Metabolic Panel; Future  -     Hemoglobin A1C; Future    Benign essential HTN  at goal.  Cont current medications.    Mixed hyperlipidemia  Lab Results   Component Value Date    LDLCALC 51.2 (L) 04/20/2023     at goal.  Cont current medications.  -     Lipid Panel; Future    Depression with anxiety  Overall controlled on Sertraline.  Some ruminating thoughts at times, can increase dose vs refer for therapy, pt states sx mild and declined at this time but will let me know if adjustments needed in the future.    Osteopenia of neck of left femur  DEXA utd and stable  Recent vit d wnl  Rec wt bearing exercises as tolerated and cont supplements.    Multinodular goiter (nontoxic)  Due for repeat u/s  -     US Soft Tissue Head Neck Thyroid; Future    Screening mammogram, encounter for  -     Mammo Digital Screening Bilat w/ Cholo; Future    Exogenous ochronosis  Sx improved but not resolved  Can f/u c Derm  -     Ambulatory referral/consult to Dermatology; Future        HM as above  RTC in 6 mos c fasting labs prior, sooner if needed.      Jade Chin MD  Department of Internal Medicine - [facility]  04/27/2023

## 2023-04-27 NOTE — LETTER
AUTHORIZATION FOR RELEASE OF   CONFIDENTIAL INFORMATION    Dear Dr. Ailin Heard,    We are seeing Suni Rodríguez, date of birth 1960, in the clinic at [facility] INTERNAL MEDICINE. Jade Chin MD is the patient's PCP. Suni Rodríguez has an outstanding lab/procedure at the time we reviewed her chart. In order to help keep her health information updated, she has authorized us to request the following medical record(s):        (  )  MAMMOGRAM                                      (  )  COLONOSCOPY      (  )  PAP SMEAR                                          (  )  OUTSIDE LAB RESULTS     (  )  DEXA SCAN                                          ( X )  EYE EXAM            (  )  FOOT EXAM                                          (  )  ENTIRE RECORD     (  )  OUTSIDE IMMUNIZATIONS                 (  )  _______________         Please fax records to Jade Chin MD, [phone]     If you have any questions, please contact DAKOTA Mclean at [phone].           Patient Name: Suni Rodríguez  : 1960  Patient Phone #: [phone]

## 2023-05-01 ENCOUNTER — TELEPHONE (OUTPATIENT)
Dept: ADMINISTRATIVE | Facility: HOSPITAL | Age: 63
End: 2023-05-01
Payer: COMMERCIAL

## 2023-05-15 ENCOUNTER — PATIENT OUTREACH (OUTPATIENT)
Dept: ADMINISTRATIVE | Facility: HOSPITAL | Age: 63
End: 2023-05-15
Payer: COMMERCIAL

## 2023-05-15 NOTE — PROGRESS NOTES
There are no preventive care reminders to display for this patient.    Triggered LINKS. Updated Care Everywhere. Imported outside diabetic eye exam results received from Dr. Ailin Heard into : negative retinopathy. Chart review completed.

## 2023-10-05 ENCOUNTER — HOSPITAL ENCOUNTER (OUTPATIENT)
Dept: RADIOLOGY | Facility: HOSPITAL | Age: 63
Discharge: HOME | End: 2023-10-05
Attending: INTERNAL MEDICINE
Payer: COMMERCIAL

## 2023-10-05 DIAGNOSIS — Z12.31 SCREENING MAMMOGRAM, ENCOUNTER FOR: ICD-10-CM

## 2023-10-05 PROCEDURE — 77063 BREAST TOMOSYNTHESIS BI: CPT | Mod: 26,,, | Performed by: RADIOLOGY

## 2023-10-05 PROCEDURE — 77067 SCR MAMMO BI INCL CAD: CPT | Mod: 26,,, | Performed by: RADIOLOGY

## 2023-10-05 PROCEDURE — 77067 SCR MAMMO BI INCL CAD: CPT | Mod: TC

## 2023-10-05 PROCEDURE — 77063 MAMMO DIGITAL SCREENING BILAT WITH TOMO: ICD-10-PCS | Mod: 26,,, | Performed by: RADIOLOGY

## 2023-10-05 PROCEDURE — 77067 MAMMO DIGITAL SCREENING BILAT WITH TOMO: ICD-10-PCS | Mod: 26,,, | Performed by: RADIOLOGY

## 2023-10-19 ENCOUNTER — HOSPITAL ENCOUNTER (OUTPATIENT)
Dept: RADIOLOGY | Facility: HOSPITAL | Age: 63
Discharge: HOME | End: 2023-10-19
Attending: INTERNAL MEDICINE
Payer: COMMERCIAL

## 2023-10-19 DIAGNOSIS — E04.2 MULTINODULAR GOITER (NONTOXIC): ICD-10-CM

## 2023-10-19 PROCEDURE — 76536 US EXAM OF HEAD AND NECK: CPT | Mod: TC

## 2023-10-19 PROCEDURE — 76536 US SOFT TISSUE HEAD NECK THYROID: ICD-10-PCS | Mod: 26,,, | Performed by: INTERNAL MEDICINE

## 2023-10-19 PROCEDURE — 76536 US EXAM OF HEAD AND NECK: CPT | Mod: 26,,, | Performed by: INTERNAL MEDICINE

## 2023-10-26 ENCOUNTER — OFFICE VISIT (OUTPATIENT)
Dept: INTERNAL MEDICINE | Facility: CLINIC | Age: 63
End: 2023-10-26
Payer: COMMERCIAL

## 2023-10-26 ENCOUNTER — IMMUNIZATION (OUTPATIENT)
Dept: INTERNAL MEDICINE | Facility: CLINIC | Age: 63
End: 2023-10-26
Payer: COMMERCIAL

## 2023-10-26 VITALS
HEART RATE: 76 BPM | WEIGHT: 145.5 LBS | DIASTOLIC BLOOD PRESSURE: 70 MMHG | HEIGHT: 64 IN | OXYGEN SATURATION: 99 % | SYSTOLIC BLOOD PRESSURE: 124 MMHG | BODY MASS INDEX: 24.84 KG/M2

## 2023-10-26 DIAGNOSIS — Z23 NEED FOR VACCINATION: ICD-10-CM

## 2023-10-26 DIAGNOSIS — E78.5 TYPE 2 DIABETES MELLITUS WITH HYPERLIPIDEMIA: Primary | ICD-10-CM

## 2023-10-26 DIAGNOSIS — E11.69 TYPE 2 DIABETES MELLITUS WITH HYPERLIPIDEMIA: Primary | ICD-10-CM

## 2023-10-26 DIAGNOSIS — F41.8 DEPRESSION WITH ANXIETY: ICD-10-CM

## 2023-10-26 DIAGNOSIS — I10 BENIGN ESSENTIAL HTN: ICD-10-CM

## 2023-10-26 DIAGNOSIS — E78.2 MIXED HYPERLIPIDEMIA: ICD-10-CM

## 2023-10-26 DIAGNOSIS — M62.838 TRAPEZIUS MUSCLE SPASM: ICD-10-CM

## 2023-10-26 DIAGNOSIS — E04.2 MULTINODULAR GOITER (NONTOXIC): ICD-10-CM

## 2023-10-26 DIAGNOSIS — H40.9 GLAUCOMA, UNSPECIFIED GLAUCOMA TYPE, UNSPECIFIED LATERALITY: ICD-10-CM

## 2023-10-26 PROCEDURE — 3074F SYST BP LT 130 MM HG: CPT | Mod: CPTII,S$GLB,, | Performed by: INTERNAL MEDICINE

## 2023-10-26 PROCEDURE — 3078F DIAST BP <80 MM HG: CPT | Mod: CPTII,S$GLB,, | Performed by: INTERNAL MEDICINE

## 2023-10-26 PROCEDURE — 1101F PT FALLS ASSESS-DOCD LE1/YR: CPT | Mod: CPTII,S$GLB,, | Performed by: INTERNAL MEDICINE

## 2023-10-26 PROCEDURE — 1101F PR PT FALLS ASSESS DOC 0-1 FALLS W/OUT INJ PAST YR: ICD-10-PCS | Mod: CPTII,S$GLB,, | Performed by: INTERNAL MEDICINE

## 2023-10-26 PROCEDURE — 4010F PR ACE/ARB THEARPY RXD/TAKEN: ICD-10-PCS | Mod: CPTII,S$GLB,, | Performed by: INTERNAL MEDICINE

## 2023-10-26 PROCEDURE — 1159F MED LIST DOCD IN RCRD: CPT | Mod: CPTII,S$GLB,, | Performed by: INTERNAL MEDICINE

## 2023-10-26 PROCEDURE — 99214 PR OFFICE/OUTPT VISIT, EST, LEVL IV, 30-39 MIN: ICD-10-PCS | Mod: S$GLB,,, | Performed by: INTERNAL MEDICINE

## 2023-10-26 PROCEDURE — G0008 FLU VACCINE - QUADRIVALENT - ADJUVANTED: ICD-10-PCS | Mod: S$GLB,,, | Performed by: INTERNAL MEDICINE

## 2023-10-26 PROCEDURE — 3044F PR MOST RECENT HEMOGLOBIN A1C LEVEL <7.0%: ICD-10-PCS | Mod: CPTII,S$GLB,, | Performed by: INTERNAL MEDICINE

## 2023-10-26 PROCEDURE — 99999 PR PBB SHADOW E&M-EST. PATIENT-LVL IV: ICD-10-PCS | Mod: PBBFAC,,, | Performed by: INTERNAL MEDICINE

## 2023-10-26 PROCEDURE — 99999 PR PBB SHADOW E&M-EST. PATIENT-LVL IV: CPT | Mod: PBBFAC,,, | Performed by: INTERNAL MEDICINE

## 2023-10-26 PROCEDURE — 2023F DILAT RTA XM W/O RTNOPTHY: CPT | Mod: CPTII,S$GLB,, | Performed by: INTERNAL MEDICINE

## 2023-10-26 PROCEDURE — 3044F HG A1C LEVEL LT 7.0%: CPT | Mod: CPTII,S$GLB,, | Performed by: INTERNAL MEDICINE

## 2023-10-26 PROCEDURE — 90694 VACC AIIV4 NO PRSRV 0.5ML IM: CPT | Mod: S$GLB,,, | Performed by: INTERNAL MEDICINE

## 2023-10-26 PROCEDURE — G0009 ADMIN PNEUMOCOCCAL VACCINE: HCPCS | Mod: S$GLB,,, | Performed by: INTERNAL MEDICINE

## 2023-10-26 PROCEDURE — 90677 PCV20 VACCINE IM: CPT | Mod: S$GLB,,, | Performed by: INTERNAL MEDICINE

## 2023-10-26 PROCEDURE — 3288F FALL RISK ASSESSMENT DOCD: CPT | Mod: CPTII,S$GLB,, | Performed by: INTERNAL MEDICINE

## 2023-10-26 PROCEDURE — G0008 ADMIN INFLUENZA VIRUS VAC: HCPCS | Mod: S$GLB,,, | Performed by: INTERNAL MEDICINE

## 2023-10-26 PROCEDURE — 90677 PNEUMOCOCCAL CONJUGATE VACCINE 20-VALENT: ICD-10-PCS | Mod: S$GLB,,, | Performed by: INTERNAL MEDICINE

## 2023-10-26 PROCEDURE — 3008F PR BODY MASS INDEX (BMI) DOCUMENTED: ICD-10-PCS | Mod: CPTII,S$GLB,, | Performed by: INTERNAL MEDICINE

## 2023-10-26 PROCEDURE — 1160F PR REVIEW ALL MEDS BY PRESCRIBER/CLIN PHARMACIST DOCUMENTED: ICD-10-PCS | Mod: CPTII,S$GLB,, | Performed by: INTERNAL MEDICINE

## 2023-10-26 PROCEDURE — 3078F PR MOST RECENT DIASTOLIC BLOOD PRESSURE < 80 MM HG: ICD-10-PCS | Mod: CPTII,S$GLB,, | Performed by: INTERNAL MEDICINE

## 2023-10-26 PROCEDURE — 2023F PR DILATED RETINAL EXAM W/O EVID OF RETINOPATHY: ICD-10-PCS | Mod: CPTII,S$GLB,, | Performed by: INTERNAL MEDICINE

## 2023-10-26 PROCEDURE — 3074F PR MOST RECENT SYSTOLIC BLOOD PRESSURE < 130 MM HG: ICD-10-PCS | Mod: CPTII,S$GLB,, | Performed by: INTERNAL MEDICINE

## 2023-10-26 PROCEDURE — 3066F NEPHROPATHY DOC TX: CPT | Mod: CPTII,S$GLB,, | Performed by: INTERNAL MEDICINE

## 2023-10-26 PROCEDURE — 1160F RVW MEDS BY RX/DR IN RCRD: CPT | Mod: CPTII,S$GLB,, | Performed by: INTERNAL MEDICINE

## 2023-10-26 PROCEDURE — G0009 PNEUMOCOCCAL CONJUGATE VACCINE 20-VALENT: ICD-10-PCS | Mod: S$GLB,,, | Performed by: INTERNAL MEDICINE

## 2023-10-26 PROCEDURE — 3061F NEG MICROALBUMINURIA REV: CPT | Mod: CPTII,S$GLB,, | Performed by: INTERNAL MEDICINE

## 2023-10-26 PROCEDURE — 1159F PR MEDICATION LIST DOCUMENTED IN MEDICAL RECORD: ICD-10-PCS | Mod: CPTII,S$GLB,, | Performed by: INTERNAL MEDICINE

## 2023-10-26 PROCEDURE — 3288F PR FALLS RISK ASSESSMENT DOCUMENTED: ICD-10-PCS | Mod: CPTII,S$GLB,, | Performed by: INTERNAL MEDICINE

## 2023-10-26 PROCEDURE — 3061F PR NEG MICROALBUMINURIA RESULT DOCUMENTED/REVIEW: ICD-10-PCS | Mod: CPTII,S$GLB,, | Performed by: INTERNAL MEDICINE

## 2023-10-26 PROCEDURE — 90694 FLU VACCINE - QUADRIVALENT - ADJUVANTED: ICD-10-PCS | Mod: S$GLB,,, | Performed by: INTERNAL MEDICINE

## 2023-10-26 PROCEDURE — 4010F ACE/ARB THERAPY RXD/TAKEN: CPT | Mod: CPTII,S$GLB,, | Performed by: INTERNAL MEDICINE

## 2023-10-26 PROCEDURE — 3066F PR DOCUMENTATION OF TREATMENT FOR NEPHROPATHY: ICD-10-PCS | Mod: CPTII,S$GLB,, | Performed by: INTERNAL MEDICINE

## 2023-10-26 PROCEDURE — 3008F BODY MASS INDEX DOCD: CPT | Mod: CPTII,S$GLB,, | Performed by: INTERNAL MEDICINE

## 2023-10-26 PROCEDURE — 99214 OFFICE O/P EST MOD 30 MIN: CPT | Mod: S$GLB,,, | Performed by: INTERNAL MEDICINE

## 2023-10-26 RX ORDER — CYCLOBENZAPRINE HCL 5 MG
5 TABLET ORAL NIGHTLY PRN
Qty: 10 TABLET | Refills: 0 | Status: SHIPPED | OUTPATIENT
Start: 2023-10-26 | End: 2023-11-05

## 2023-10-26 RX ORDER — AMLODIPINE BESYLATE 5 MG/1
5 TABLET ORAL DAILY
Qty: 90 TABLET | Refills: 3 | Status: SHIPPED | OUTPATIENT
Start: 2023-10-26 | End: 2024-01-16 | Stop reason: SDUPTHER

## 2023-10-26 RX ORDER — ATORVASTATIN CALCIUM 80 MG/1
80 TABLET, FILM COATED ORAL DAILY
Qty: 90 TABLET | Refills: 3 | Status: SHIPPED | OUTPATIENT
Start: 2023-10-26 | End: 2024-01-16 | Stop reason: SDUPTHER

## 2023-10-26 RX ORDER — LISINOPRIL 40 MG/1
40 TABLET ORAL DAILY
Qty: 90 TABLET | Refills: 3 | Status: SHIPPED | OUTPATIENT
Start: 2023-10-26 | End: 2024-01-16 | Stop reason: SDUPTHER

## 2023-10-26 RX ORDER — SERTRALINE HYDROCHLORIDE 50 MG/1
50 TABLET, FILM COATED ORAL DAILY
Qty: 90 TABLET | Refills: 3 | Status: SHIPPED | OUTPATIENT
Start: 2023-10-26 | End: 2024-01-16 | Stop reason: SDUPTHER

## 2023-10-26 RX ORDER — METFORMIN HYDROCHLORIDE 500 MG/1
500 TABLET, EXTENDED RELEASE ORAL DAILY
Qty: 90 TABLET | Refills: 3 | Status: SHIPPED | OUTPATIENT
Start: 2023-10-26 | End: 2024-01-16 | Stop reason: SDUPTHER

## 2023-10-26 RX ORDER — METOPROLOL SUCCINATE 50 MG/1
50 TABLET, EXTENDED RELEASE ORAL DAILY
Qty: 90 TABLET | Refills: 3 | Status: SHIPPED | OUTPATIENT
Start: 2023-10-26 | End: 2024-01-16 | Stop reason: SDUPTHER

## 2023-10-26 NOTE — PROGRESS NOTES
"INTERNAL MEDICINE ESTABLISHED PATIENT VISIT NOTE    Subjective:     Chief Complaint: Follow-up  HTN, DM     Patient ID: Suni Rodríguez is a 63 y.o. female with HTN, type 2 DM uncomplicated, depression c anxiety, GERD, glaucoma, nontoxic MNG (last u/s 3/2020 c mult nodules c plan to repeat u/s in 3 yrs), hx adhesive capsulitis of L shoulder, last seen by me in April for her annual exam, here today for f/u HTN, DM, and lab results.    Feeling well overall and taking all meds as rx'ed.  Does c/o some neck and shoulder pain this morning.  States it feels tight.  Thinks she "slept funny."    Past Medical History:  Past Medical History:   Diagnosis Date    Adhesive capsulitis of left shoulder     Benign essential HTN     Depression with anxiety     GERD without esophagitis     Glaucoma     Mixed hyperlipidemia     Multinodular goiter (nontoxic)     Uncomplicated type 2 diabetes mellitus        Home Medications:  Prior to Admission medications    Medication Sig Start Date End Date Taking? Authorizing Provider   amLODIPine (NORVASC) 5 MG tablet Take 1 tablet (5 mg total) by mouth once daily. 10/27/22  Yes Jade Chin MD   atorvastatin (LIPITOR) 80 MG tablet Take 1 tablet (80 mg total) by mouth once daily. 10/27/22  Yes Jade Chin MD   dorzolamide-timolol 2-0.5% (COSOPT) 22.3-6.8 mg/mL ophthalmic solution INSTILL 1 DROP INTO BOTH EYES 2 TIMES DAILY. 3/2/17  Yes Provider, Historical   latanoprost 0.005 % ophthalmic solution  4/23/17  Yes Provider, Historical   lisinopriL (PRINIVIL,ZESTRIL) 40 MG tablet Take 1 tablet (40 mg total) by mouth once daily. 12/20/22  Yes Jade Chin MD   metFORMIN (GLUCOPHAGE-XR) 500 MG ER 24hr tablet Take 1 tablet (500 mg total) by mouth once daily. 10/27/22  Yes Jade Chin MD   metoprolol succinate (TOPROL-XL) 50 MG 24 hr tablet Take 1 tablet (50 mg total) by mouth once daily. 10/27/22  Yes Jade Chin MD   metronidazole 1% (METROGEL) 1 % Gel APPLY TO AFFECTED AREA EVERY DAY 4/13/23  Yes " Jade Chin MD   multivitamin (THERAGRAN) per tablet Take 1 tablet by mouth once daily.   Yes Provider, Historical   prasterone, dhea, (INTRAROSA) 6.5 mg Inst Place 6.5 mg vaginally every evening. 22  Yes Magda Ramos NP   sertraline (ZOLOFT) 50 MG tablet TAKE 1 TABLET BY MOUTH EVERY DAY 23  Yes Jade Chin MD   tacrolimus (PROTOPIC) 0.1 % ointment Apply topically 2 (two) times daily. 11/3/22  Yes Phil Vincent MD   tretinoin (RETIN-A) 0.05 % cream Apply topically every evening. Start 3 times weekly then increase to every night as tolerated. Pea sized amount can cover entire face 11/3/22  Yes Phil Vincent MD       Allergies:  Review of patient's allergies indicates:   Allergen Reactions    Penicillins Hives       Social History:  Social History     Tobacco Use    Smoking status: Former     Current packs/day: 0.00     Average packs/day: 1 pack/day for 23.0 years (23.0 ttl pk-yrs)     Types: Cigarettes     Start date: 1973     Quit date: 1996     Years since quittin.8     Passive exposure: Never    Smokeless tobacco: Never    Tobacco comments:     quit smoking in    Substance Use Topics    Alcohol use: Yes     Alcohol/week: 2.0 standard drinks of alcohol     Types: 2 Glasses of wine per week     Comment: Sometimes special occasions, holidays only    Drug use: No        Review of Systems   Constitutional:  Negative for chills, fatigue and fever.   Respiratory:  Negative for cough, chest tightness and shortness of breath.    Cardiovascular:  Negative for chest pain.   Gastrointestinal:  Negative for abdominal pain and blood in stool.   Genitourinary:  Negative for dysuria and frequency.   Musculoskeletal:  Positive for neck pain.   Hematological: Negative.          Health Maintenance:     Immunizations:   Influenza 10/2022, repeat today.  TDap is up to date 2016  Pneumovax is up to date. 2013 since DM, rec Prevnar 20 today.  Shingrix completed 2020, 10/2020  COVID vaccines  "completed Pfizer 3/2/2021, 3/23/2021, 10/2021, 10/2022, rec repeat once Pfizer booster available.  Rec RSV vaccine in 2 weeks.     Cancer Screening:  PAP: 10/2021 NILM c neg HPV c LENCHO Wyatt done 12/2022 c NP Francheska St. Beard  Mammogram: 10/2023 benign  Colonoscopy: 2/2021, polyp removed, path c/w tubular adenoma, rec repeat in 7 yrs.  DEXA:  12/2022 c osteopenia, improved at L spine and stable at hip    Objective:   /70 (BP Location: Left arm, Patient Position: Sitting, BP Method: Medium (Automatic))   Pulse 76   Ht 5' 4" (1.626 m)   Wt 66 kg (145 lb 8.1 oz)   SpO2 99%   BMI 24.98 kg/m²        General: AAO x3, no apparent distress  HEENT: no cervical LAD, no thyroid masses appreciated  CV: RRR, no m/r/g  Pulm: Lungs CTAB, no crackles, no wheezes  Abd: s/NT/ND +BS  Extremities: no c/c/e  Foot exam completed today.  No decreased sensation with monofilament bilaterally.  No ulcerations noted.  No calluses.  2+ distal pulses bilaterally.      Labs:     Lab Results   Component Value Date    WBC 9.04 04/20/2023    HGB 13.5 04/20/2023    HCT 43.7 04/20/2023    MCV 92 04/20/2023     04/20/2023     Sodium   Date Value Ref Range Status   10/19/2023 142 136 - 145 mmol/L Final     Potassium   Date Value Ref Range Status   10/19/2023 4.1 3.5 - 5.1 mmol/L Final     Chloride   Date Value Ref Range Status   10/19/2023 108 95 - 110 mmol/L Final     CO2   Date Value Ref Range Status   10/19/2023 26 23 - 29 mmol/L Final     Glucose   Date Value Ref Range Status   10/19/2023 103 70 - 110 mg/dL Final     BUN   Date Value Ref Range Status   10/19/2023 8 8 - 23 mg/dL Final     Creatinine   Date Value Ref Range Status   10/19/2023 0.7 0.5 - 1.4 mg/dL Final     Calcium   Date Value Ref Range Status   10/19/2023 9.5 8.7 - 10.5 mg/dL Final     Total Protein   Date Value Ref Range Status   10/19/2023 7.1 6.0 - 8.4 g/dL Final     Albumin   Date Value Ref Range Status   10/19/2023 3.9 3.5 - 5.2 g/dL Final "     Total Bilirubin   Date Value Ref Range Status   10/19/2023 0.5 0.1 - 1.0 mg/dL Final     Comment:     For infants and newborns, interpretation of results should be based  on gestational age, weight and in agreement with clinical  observations.    Premature Infant recommended reference ranges:  Up to 24 hours.............<8.0 mg/dL  Up to 48 hours............<12.0 mg/dL  3-5 days..................<15.0 mg/dL  6-29 days.................<15.0 mg/dL       Alkaline Phosphatase   Date Value Ref Range Status   10/19/2023 100 55 - 135 U/L Final     AST   Date Value Ref Range Status   10/19/2023 23 10 - 40 U/L Final     ALT   Date Value Ref Range Status   10/19/2023 27 10 - 44 U/L Final     Anion Gap   Date Value Ref Range Status   10/19/2023 8 8 - 16 mmol/L Final     eGFR if    Date Value Ref Range Status   04/21/2022 >60.0 >60 mL/min/1.73 m^2 Final     eGFR if non    Date Value Ref Range Status   04/21/2022 >60.0 >60 mL/min/1.73 m^2 Final     Comment:     Calculation used to obtain the estimated glomerular filtration  rate (eGFR) is the CKD-EPI equation.        Lab Results   Component Value Date    HGBA1C 6.1 (H) 10/19/2023     Lab Results   Component Value Date    LDLCALC 58.4 (L) 10/19/2023     Lab Results   Component Value Date    TSH 0.618 10/21/2021         Assessment/Plan     Suni was seen today for follow-up.    Diagnoses and all orders for this visit:    Type 2 diabetes mellitus with hyperlipidemia  History and physical exam completed.  Health maintenance reviewed as above.  -     metFORMIN (GLUCOPHAGE-XR) 500 MG ER 24hr tablet; Take 1 tablet (500 mg total) by mouth once daily.  -     CBC Auto Differential; Future  -     Comprehensive Metabolic Panel; Future  -     Hemoglobin A1C; Future  -     TSH; Future  -     Microalbumin/Creatinine Ratio, Urine; Future    Mixed hyperlipidemia  Lab Results   Component Value Date    LDLCALC 58.4 (L) 10/19/2023     at goal.  Cont current  medications.  -     atorvastatin (LIPITOR) 80 MG tablet; Take 1 tablet (80 mg total) by mouth once daily.  -     Lipid Panel; Future    Benign essential HTN  at goal.  Cont current medications.  -     lisinopriL (PRINIVIL,ZESTRIL) 40 MG tablet; Take 1 tablet (40 mg total) by mouth once daily.  -     amLODIPine (NORVASC) 5 MG tablet; Take 1 tablet (5 mg total) by mouth once daily.  -     metoprolol succinate (TOPROL-XL) 50 MG 24 hr tablet; Take 1 tablet (50 mg total) by mouth once daily.    Depression with anxiety  Stable on current regimen  Cont meds  -     sertraline (ZOLOFT) 50 MG tablet; Take 1 tablet (50 mg total) by mouth once daily.    Glaucoma, unspecified glaucoma type, unspecified laterality  Stable, cont routine f/u c Dr. Heard at [facility], sees q6 mos    Multinodular goiter (nontoxic)  Stable on recent thyroid u/s, no longer req surveillance    Trapezius muscle spasm  Noted on exam  Rec heating pad, stretching exercises  Okay for short course of Flexeril  -     cyclobenzaprine (FLEXERIL) 5 MG tablet; Take 1 tablet (5 mg total) by mouth nightly as needed for Muscle spasms.    Need for vaccination  -     (In Office Administered) Pneumococcal Conjugate Vaccine (20 Valent) (IM) (Preferred)        HM as above  RTC in 6 mos c fasting labs one week prior, sooner if needed.  Flu and Prevnar today.    Jade Chin MD  Department of Internal Medicine - [facility]  10/26/2023

## 2023-11-03 DIAGNOSIS — N95.2 POSTMENOPAUSAL ATROPHIC VAGINITIS: ICD-10-CM

## 2023-11-27 ENCOUNTER — TELEPHONE (OUTPATIENT)
Dept: INTERNAL MEDICINE | Facility: CLINIC | Age: 63
End: 2023-11-27
Payer: COMMERCIAL

## 2023-11-27 NOTE — TELEPHONE ENCOUNTER
----- Message from Priscila Hebert sent at 11/27/2023 12:22 PM CST -----  Contact: [phone] Patient  Patient would like to get medical advice.  Symptoms (please be specific):   Pt states she has a Medicare form for Dr Chin to verify her medical condition and sign. Pt states the Medicare enrollment ends on 12/07/23 and she needs to have enough time to select another plan if she does not qualify for the plan she selected. Pt states she would like to drop off the paperwork at the office.   How long have you had these symptoms: N/A  Would you like a call back, or a response through your [facility] portal?:  call back   Pharmacy name and phone # (copy from chart):   N/A  Comments:

## 2024-01-11 DIAGNOSIS — Z00.00 ENCOUNTER FOR MEDICARE ANNUAL WELLNESS EXAM: ICD-10-CM

## 2024-01-16 ENCOUNTER — TELEPHONE (OUTPATIENT)
Dept: INTERNAL MEDICINE | Facility: CLINIC | Age: 64
End: 2024-01-16
Payer: MEDICARE

## 2024-01-16 DIAGNOSIS — E78.2 MIXED HYPERLIPIDEMIA: ICD-10-CM

## 2024-01-16 DIAGNOSIS — I10 BENIGN ESSENTIAL HTN: ICD-10-CM

## 2024-01-16 DIAGNOSIS — E78.5 TYPE 2 DIABETES MELLITUS WITH HYPERLIPIDEMIA: ICD-10-CM

## 2024-01-16 DIAGNOSIS — F41.8 DEPRESSION WITH ANXIETY: ICD-10-CM

## 2024-01-16 DIAGNOSIS — E11.69 TYPE 2 DIABETES MELLITUS WITH HYPERLIPIDEMIA: ICD-10-CM

## 2024-01-16 RX ORDER — METOPROLOL SUCCINATE 50 MG/1
50 TABLET, EXTENDED RELEASE ORAL DAILY
Qty: 90 TABLET | Refills: 3 | Status: SHIPPED | OUTPATIENT
Start: 2024-01-16

## 2024-01-16 RX ORDER — SERTRALINE HYDROCHLORIDE 50 MG/1
50 TABLET, FILM COATED ORAL DAILY
Qty: 90 TABLET | Refills: 3 | Status: SHIPPED | OUTPATIENT
Start: 2024-01-16

## 2024-01-16 RX ORDER — ATORVASTATIN CALCIUM 80 MG/1
80 TABLET, FILM COATED ORAL DAILY
Qty: 90 TABLET | Refills: 3 | Status: SHIPPED | OUTPATIENT
Start: 2024-01-16

## 2024-01-16 RX ORDER — METFORMIN HYDROCHLORIDE 500 MG/1
500 TABLET, EXTENDED RELEASE ORAL DAILY
Qty: 90 TABLET | Refills: 3 | Status: SHIPPED | OUTPATIENT
Start: 2024-01-16

## 2024-01-16 RX ORDER — AMLODIPINE BESYLATE 5 MG/1
5 TABLET ORAL DAILY
Qty: 90 TABLET | Refills: 3 | Status: SHIPPED | OUTPATIENT
Start: 2024-01-16

## 2024-01-16 RX ORDER — LISINOPRIL 40 MG/1
40 TABLET ORAL DAILY
Qty: 90 TABLET | Refills: 3 | Status: SHIPPED | OUTPATIENT
Start: 2024-01-16

## 2024-01-16 NOTE — TELEPHONE ENCOUNTER
----- Message from Sylvia Tay sent at 1/16/2024 12:45 PM CST -----  Contact: Patient @ [phone]  Requesting an RX refill or new RX.  Is this a refill or new RX:   RX name and strength lisinopriL (PRINIVIL,ZESTRIL) 40 MG tablet  Is this a 30 day or 90 day RX: 90  Pharmacy name and phone # [facility] [address] [facility]  The doctors have asked that we provide their patients with the following 2 reminders -- prescription refills can take up to 72 hours, and a friendly reminder that in the future you can use your [facility] account to request refills: YES    Requesting an RX refill or new RX.  Is this a refill or new RX:   RX name and strength amLODIPine (NORVASC) 5 MG tablet  Is this a 30 day or 90 day RX: 90  Pharmacy name and phone #[facility] [address]  The doctors have asked that we provide their patients with the following 2 reminders -- prescription refills can take up to 72 hours, and a friendly reminder that in the future you can use your [facility] account to request refills: yes    Requesting an RX refill or new RX.  Is this a refill or new RX:   RX name and strength metoprolol succinate (TOPROL-XL) 50 MG 24 hr tablet   Is this a 30 day or 90 day RX: 90  Pharmacy name and phone # [place], [place] - [phone]   The doctors have asked that we provide their patients with the following 2 reminders -- prescription refills can take up to 72 hours, and a friendly reminder that in the future you can use your [facility] account to request refills: YES    Requesting an RX refill or new RX.  Is this a refill or new RX:   RX name and strength metFORMIN (GLUCOPHAGE-XR) 500 MG ER 24hr tablet   Is this a 30 day or 90 day RX: 90  Pharmacy name and phone # [facility] [address] [facility]  The doctors have asked that we provide their patients with  the following 2 reminders -- prescription refills can take up to 72 hours, and a friendly reminder that in the future you can use your [facility] account to request refills: YES    Requesting an RX refill or new RX.  Is this a refill or new RX:   RX name and strength atorvastatin (LIPITOR) 80 MG tablet  Is this a 30 day or 90 day RX: 90  Pharmacy name and phone # [facility] [address] [facility]   The doctors have asked that we provide their patients with the following 2 reminders -- prescription refills can take up to 72 hours, and a friendly reminder that in the future you can use your [facility] account to request refills: YES    Requesting an RX refill or new RX.  Is this a refill or new RX:   RX name and strength sertraline (ZOLOFT) 50 MG tablet  Is this a 30 day or 90 day RX: 90  Pharmacy name and phone # [facility] [address]  The doctors have asked that we provide their patients with the following 2 reminders -- prescription refills can take up to 72 hours, and a friendly reminder that in the future you can use your [facility] account to request refills: YES

## 2024-01-23 ENCOUNTER — TELEPHONE (OUTPATIENT)
Dept: INTERNAL MEDICINE | Facility: CLINIC | Age: 64
End: 2024-01-23
Payer: MEDICARE

## 2024-01-23 NOTE — TELEPHONE ENCOUNTER
----- Message from Merced Singh sent at 1/23/2024  2:39 PM CST -----  Contact: [phone] Patient  [email or web address]     Patient is calling for Medical Advice regarding: persistent cough, congestion, headache, sore throat, chills ,fever. While pt is coughing she can't stop coughing and having trouble with breathing while coughing she states she can't get any air in. Pt stated she is taking robitussin, drinking teas, and cough drops with no relief    How long has patient had these symptoms: x 3 weeks for the cough ,everything in the beginning    Pharmacy name and phone#:   [id number] [address]  [address]  [place] [address]  Phone: [phone] Fax: [phone]      Would like response via [place]:  Call Back Please. Pt is asking if the doctor can please call something in for the cough.     Comments:

## 2024-01-24 ENCOUNTER — OFFICE VISIT (OUTPATIENT)
Dept: INTERNAL MEDICINE | Facility: CLINIC | Age: 64
End: 2024-01-24
Payer: MEDICARE

## 2024-01-24 VITALS
OXYGEN SATURATION: 99 % | DIASTOLIC BLOOD PRESSURE: 80 MMHG | SYSTOLIC BLOOD PRESSURE: 120 MMHG | HEART RATE: 64 BPM | HEIGHT: 64 IN | WEIGHT: 149.5 LBS | BODY MASS INDEX: 25.52 KG/M2

## 2024-01-24 DIAGNOSIS — E78.5 TYPE 2 DIABETES MELLITUS WITH HYPERLIPIDEMIA: ICD-10-CM

## 2024-01-24 DIAGNOSIS — J01.00 ACUTE NON-RECURRENT MAXILLARY SINUSITIS: Primary | ICD-10-CM

## 2024-01-24 DIAGNOSIS — E11.69 TYPE 2 DIABETES MELLITUS WITH HYPERLIPIDEMIA: ICD-10-CM

## 2024-01-24 DIAGNOSIS — R05.2 SUBACUTE COUGH: ICD-10-CM

## 2024-01-24 DIAGNOSIS — I10 BENIGN ESSENTIAL HTN: ICD-10-CM

## 2024-01-24 PROBLEM — E70.29: Status: RESOLVED | Noted: 2023-04-27 | Resolved: 2024-01-24

## 2024-01-24 PROCEDURE — 1159F MED LIST DOCD IN RCRD: CPT | Mod: CPTII,S$GLB,, | Performed by: INTERNAL MEDICINE

## 2024-01-24 PROCEDURE — 99999 PR PBB SHADOW E&M-EST. PATIENT-LVL IV: CPT | Mod: PBBFAC,,, | Performed by: INTERNAL MEDICINE

## 2024-01-24 PROCEDURE — 1160F RVW MEDS BY RX/DR IN RCRD: CPT | Mod: CPTII,S$GLB,, | Performed by: INTERNAL MEDICINE

## 2024-01-24 PROCEDURE — 3079F DIAST BP 80-89 MM HG: CPT | Mod: CPTII,S$GLB,, | Performed by: INTERNAL MEDICINE

## 2024-01-24 PROCEDURE — 3288F FALL RISK ASSESSMENT DOCD: CPT | Mod: CPTII,S$GLB,, | Performed by: INTERNAL MEDICINE

## 2024-01-24 PROCEDURE — 1126F AMNT PAIN NOTED NONE PRSNT: CPT | Mod: CPTII,S$GLB,, | Performed by: INTERNAL MEDICINE

## 2024-01-24 PROCEDURE — 3008F BODY MASS INDEX DOCD: CPT | Mod: CPTII,S$GLB,, | Performed by: INTERNAL MEDICINE

## 2024-01-24 PROCEDURE — 4010F ACE/ARB THERAPY RXD/TAKEN: CPT | Mod: CPTII,S$GLB,, | Performed by: INTERNAL MEDICINE

## 2024-01-24 PROCEDURE — 3074F SYST BP LT 130 MM HG: CPT | Mod: CPTII,S$GLB,, | Performed by: INTERNAL MEDICINE

## 2024-01-24 PROCEDURE — 99214 OFFICE O/P EST MOD 30 MIN: CPT | Mod: S$GLB,,, | Performed by: INTERNAL MEDICINE

## 2024-01-24 PROCEDURE — 1101F PT FALLS ASSESS-DOCD LE1/YR: CPT | Mod: CPTII,S$GLB,, | Performed by: INTERNAL MEDICINE

## 2024-01-24 RX ORDER — AZITHROMYCIN 250 MG/1
TABLET, FILM COATED ORAL
Qty: 6 TABLET | Refills: 0 | Status: SHIPPED | OUTPATIENT
Start: 2024-01-24 | End: 2024-01-29

## 2024-01-24 RX ORDER — CODEINE PHOSPHATE AND GUAIFENESIN 10; 100 MG/5ML; MG/5ML
5 SOLUTION ORAL EVERY 6 HOURS PRN
Qty: 237 ML | Refills: 0 | Status: SHIPPED | OUTPATIENT
Start: 2024-01-24 | End: 2024-02-05

## 2024-01-24 NOTE — PROGRESS NOTES
INTERNAL MEDICINE SAME DAY PRIMARY CARE VISIT NOTE    Subjective:     Chief Complaint: Cough       Patient ID: Suni Rodríguez is a 63 y.o. female with HTN, type 2 DM uncomplicated, depression c anxiety, GERD, glaucoma, nontoxic MNG (last u/s 3/2020 c mult nodules c plan to repeat u/s in 3 yrs), hx adhesive capsulitis of L shoulder, last seen by me in Oct for her 6 mo f/u, here today for focused same-day primary care visit.    Today, patient with complaint of persistent cough for 2-3 weeks.  About 3 weeks ago started c sore throat, rhinorrhea, cough, chills, and congestion.   Home COVID test negative.   had similar sx.  No fever.  +body aches.  +sneezing.  States she felt better after about a week and a half but then started feeling worse c worsening cough and congestion.  Cough productive.  Had recent coughing episode that made her gag and throw up.      Past Medical History:  Past Medical History:   Diagnosis Date    Adhesive capsulitis of left shoulder     Benign essential HTN     Depression with anxiety     GERD without esophagitis     Glaucoma     Mixed hyperlipidemia     Multinodular goiter (nontoxic)     Uncomplicated type 2 diabetes mellitus        Home Medications:  Prior to Admission medications    Medication Sig Start Date End Date Taking? Authorizing Provider   amLODIPine (NORVASC) 5 MG tablet Take 1 tablet (5 mg total) by mouth once daily. 1/16/24   Jade Chin MD   atorvastatin (LIPITOR) 80 MG tablet Take 1 tablet (80 mg total) by mouth once daily. 1/16/24   Jade Chin MD   dorzolamide-timolol 2-0.5% (COSOPT) 22.3-6.8 mg/mL ophthalmic solution INSTILL 1 DROP INTO BOTH EYES 2 TIMES DAILY. 3/2/17   Provider, Historical   latanoprost 0.005 % ophthalmic solution  4/23/17   Provider, Historical   lisinopriL (PRINIVIL,ZESTRIL) 40 MG tablet Take 1 tablet (40 mg total) by mouth once daily. 1/16/24   Jade Chin MD   metFORMIN (GLUCOPHAGE-XR) 500 MG ER 24hr tablet Take 1 tablet (500 mg total) by  mouth once daily. 24   Jade Chin MD   metoprolol succinate (TOPROL-XL) 50 MG 24 hr tablet Take 1 tablet (50 mg total) by mouth once daily. 24   Jade Chin MD   metronidazole 1% (METROGEL) 1 % Gel APPLY TO AFFECTED AREA EVERY DAY 23   Jade Chin MD   multivitamin (THERAGRAN) per tablet Take 1 tablet by mouth once daily.    Provider, Historical   prasterone, dhea, (INTRAROSA) 6.5 mg Inst Place 6.5 mg vaginally every evening. 23   Magda Ramos, NP   sertraline (ZOLOFT) 50 MG tablet Take 1 tablet (50 mg total) by mouth once daily. 24   Jade Chin MD   tacrolimus (PROTOPIC) 0.1 % ointment Apply topically 2 (two) times daily. 11/3/22   Phil Vincent MD   tretinoin (RETIN-A) 0.05 % cream Apply topically every evening. Start 3 times weekly then increase to every night as tolerated. Pea sized amount can cover entire face 11/3/22   Phil Vincent MD       Allergies:  Review of patient's allergies indicates:   Allergen Reactions    Penicillins Hives       Social History:  Social History     Tobacco Use    Smoking status: Former     Current packs/day: 0.00     Average packs/day: 1 pack/day for 23.0 years (23.0 ttl pk-yrs)     Types: Cigarettes     Start date: 1973     Quit date: 1996     Years since quittin.0     Passive exposure: Never    Smokeless tobacco: Never    Tobacco comments:     quit smoking in    Substance Use Topics    Alcohol use: Yes     Alcohol/week: 2.0 standard drinks of alcohol     Types: 2 Glasses of wine per week     Comment: Sometimes special occasions, holidays only    Drug use: No         Review of Systems   Constitutional:  Positive for fatigue. Negative for chills and fever.   HENT:  Positive for congestion, postnasal drip, rhinorrhea, sinus pressure, sinus pain and sore throat.    Respiratory:  Positive for cough. Negative for chest tightness and shortness of breath.    Cardiovascular:  Negative for chest pain.   Gastrointestinal:  Negative for  "abdominal pain and blood in stool.   Genitourinary:  Negative for dysuria and frequency.           Objective:   /80 (BP Location: Left arm, Patient Position: Sitting, BP Method: Medium (Manual))   Pulse 64   Ht 5' 4" (1.626 m)   Wt 67.8 kg (149 lb 7.6 oz)   SpO2 99%   BMI 25.66 kg/m²        General: AAO x3, coughing on exam.  HEENT: nasal turbinates boggy, post nasal drip noted.  CV: RRR, no m/r/g  Pulm: Lungs CTAB, no crackles, no wheezes  Abd: s/NT/ND +BS    Labs:         Assessment/Plan     Suni was seen today for cough.    Diagnoses and all orders for this visit:    Acute non-recurrent maxillary sinusitis  Based on duration of sx will tx c Zpak  Also rec otc Coricidin and Mucinex  Increase water intake  -     azithromycin (Z-JONAH) 250 MG tablet; Take 2 tablets by mouth on day 1; Take 1 tablet by mouth on days 2-5    Subacute cough  -     guaiFENesin-codeine 100-10 mg/5 ml (TUSSI-ORGANIDIN NR)  mg/5 mL syrup; Take 5 mLs by mouth every 6 (six) hours as needed for Cough.    Type 2 diabetes mellitus with hyperlipidemia  Lab Results   Component Value Date    HGBA1C 6.1 (H) 10/19/2023     Well controlled on last check  Keep f/u in April    Benign essential HTN  at goal.  Cont current medications.      RTC prn and with PCP as per routine.    Jade Chin MD  Department of Internal Medicine - [facility]  01/24/2024    "

## 2024-04-22 RX ORDER — CYCLOBENZAPRINE HCL 5 MG
5 TABLET ORAL 3 TIMES DAILY PRN
Qty: 30 TABLET | Refills: 0 | Status: SHIPPED | OUTPATIENT
Start: 2024-04-22 | End: 2024-05-02

## 2024-04-22 NOTE — TELEPHONE ENCOUNTER
----- Message from Valentinolelaleena Starks sent at 4/22/2024  9:42 AM CDT -----  Contact: [phone]  Requesting an RX refill or new RX.  Is this a refill or new RX: refill   RX name and strength (copy/paste from chart):  cyclobenzaprine (FLEXERIL) 5 MG tablet  Is this a 30 day or 90 day RX:   Pharmacy name and phone # (copy/paste from chart):    [id number] [address]  [address] [facility] [place] [address]  Phone: [phone] Fax: [phone]    The doctors have asked that we provide their patients with the following 2 reminders -- prescription refills can take up to 72 hours, and a friendly reminder that in the future you can use your [facility] account to request refills: y

## 2024-04-22 NOTE — TELEPHONE ENCOUNTER
Care Due:                  Date            Visit Type   Department     Provider  --------------------------------------------------------------------------------                                SAME DAY -                              ESTABLISHED   [facility] INTERNAL  Last Visit: 01-      PATIENT      MEDICINE       Britany Chin                              EP -                              PRIMARY      [facility] INTERNAL  Next Visit: 04-      CARE ([facility])   MEDICINE       Britany Chin                                                            Last  Test          Frequency    Reason                     Performed    Due Date  --------------------------------------------------------------------------------    HBA1C.......  6 months...  metFORMIN................  10-   04-    [employer] Embedded Care Due Messages. Reference number: [id number].   4/22/2024 9:46:27 AM CDT

## 2024-04-24 ENCOUNTER — LAB VISIT (OUTPATIENT)
Dept: LAB | Facility: HOSPITAL | Age: 64
End: 2024-04-24
Attending: INTERNAL MEDICINE
Payer: MEDICARE

## 2024-04-24 DIAGNOSIS — E11.69 TYPE 2 DIABETES MELLITUS WITH HYPERLIPIDEMIA: ICD-10-CM

## 2024-04-24 DIAGNOSIS — E78.5 TYPE 2 DIABETES MELLITUS WITH HYPERLIPIDEMIA: ICD-10-CM

## 2024-04-24 LAB
ALBUMIN/CREAT UR: NORMAL UG/MG (ref 0–30)
CREAT UR-MCNC: 88 MG/DL (ref 15–325)
MICROALBUMIN UR DL<=1MG/L-MCNC: <5 UG/ML

## 2024-04-24 PROCEDURE — 82043 UR ALBUMIN QUANTITATIVE: CPT | Performed by: INTERNAL MEDICINE

## 2024-04-29 ENCOUNTER — PATIENT OUTREACH (OUTPATIENT)
Dept: ADMINISTRATIVE | Facility: HOSPITAL | Age: 64
End: 2024-04-29
Payer: MEDICARE

## 2024-04-29 ENCOUNTER — OFFICE VISIT (OUTPATIENT)
Dept: INTERNAL MEDICINE | Facility: CLINIC | Age: 64
End: 2024-04-29
Payer: MEDICARE

## 2024-04-29 ENCOUNTER — HOSPITAL ENCOUNTER (OUTPATIENT)
Dept: RADIOLOGY | Facility: HOSPITAL | Age: 64
Discharge: HOME | End: 2024-04-29
Attending: INTERNAL MEDICINE
Payer: MEDICARE

## 2024-04-29 VITALS
DIASTOLIC BLOOD PRESSURE: 80 MMHG | SYSTOLIC BLOOD PRESSURE: 136 MMHG | BODY MASS INDEX: 26.2 KG/M2 | WEIGHT: 153.44 LBS | OXYGEN SATURATION: 98 % | HEIGHT: 64 IN | HEART RATE: 80 BPM

## 2024-04-29 DIAGNOSIS — E11.69 TYPE 2 DIABETES MELLITUS WITH HYPERLIPIDEMIA: Primary | ICD-10-CM

## 2024-04-29 DIAGNOSIS — R05.3 CHRONIC COUGH: ICD-10-CM

## 2024-04-29 DIAGNOSIS — E04.2 MULTINODULAR GOITER (NONTOXIC): ICD-10-CM

## 2024-04-29 DIAGNOSIS — F41.8 DEPRESSION WITH ANXIETY: ICD-10-CM

## 2024-04-29 DIAGNOSIS — E78.2 MIXED HYPERLIPIDEMIA: ICD-10-CM

## 2024-04-29 DIAGNOSIS — R00.2 PALPITATIONS: ICD-10-CM

## 2024-04-29 DIAGNOSIS — M85.852 OSTEOPENIA OF NECK OF LEFT FEMUR: ICD-10-CM

## 2024-04-29 DIAGNOSIS — I10 BENIGN ESSENTIAL HTN: ICD-10-CM

## 2024-04-29 DIAGNOSIS — E78.5 TYPE 2 DIABETES MELLITUS WITH HYPERLIPIDEMIA: Primary | ICD-10-CM

## 2024-04-29 DIAGNOSIS — Z12.31 SCREENING MAMMOGRAM, ENCOUNTER FOR: ICD-10-CM

## 2024-04-29 PROCEDURE — 1160F RVW MEDS BY RX/DR IN RCRD: CPT | Mod: CPTII,S$GLB,, | Performed by: INTERNAL MEDICINE

## 2024-04-29 PROCEDURE — 1126F AMNT PAIN NOTED NONE PRSNT: CPT | Mod: CPTII,S$GLB,, | Performed by: INTERNAL MEDICINE

## 2024-04-29 PROCEDURE — 1101F PT FALLS ASSESS-DOCD LE1/YR: CPT | Mod: CPTII,S$GLB,, | Performed by: INTERNAL MEDICINE

## 2024-04-29 PROCEDURE — 71046 X-RAY EXAM CHEST 2 VIEWS: CPT | Mod: TC

## 2024-04-29 PROCEDURE — 4010F ACE/ARB THERAPY RXD/TAKEN: CPT | Mod: CPTII,S$GLB,, | Performed by: INTERNAL MEDICINE

## 2024-04-29 PROCEDURE — 71046 X-RAY EXAM CHEST 2 VIEWS: CPT | Mod: 26,,, | Performed by: RADIOLOGY

## 2024-04-29 PROCEDURE — 3061F NEG MICROALBUMINURIA REV: CPT | Mod: CPTII,S$GLB,, | Performed by: INTERNAL MEDICINE

## 2024-04-29 PROCEDURE — 99999 PR PBB SHADOW E&M-EST. PATIENT-LVL V: CPT | Mod: PBBFAC,,, | Performed by: INTERNAL MEDICINE

## 2024-04-29 PROCEDURE — 3075F SYST BP GE 130 - 139MM HG: CPT | Mod: CPTII,S$GLB,, | Performed by: INTERNAL MEDICINE

## 2024-04-29 PROCEDURE — 3079F DIAST BP 80-89 MM HG: CPT | Mod: CPTII,S$GLB,, | Performed by: INTERNAL MEDICINE

## 2024-04-29 PROCEDURE — 99214 OFFICE O/P EST MOD 30 MIN: CPT | Mod: S$GLB,,, | Performed by: INTERNAL MEDICINE

## 2024-04-29 PROCEDURE — 3008F BODY MASS INDEX DOCD: CPT | Mod: CPTII,S$GLB,, | Performed by: INTERNAL MEDICINE

## 2024-04-29 PROCEDURE — 3066F NEPHROPATHY DOC TX: CPT | Mod: CPTII,S$GLB,, | Performed by: INTERNAL MEDICINE

## 2024-04-29 PROCEDURE — 3288F FALL RISK ASSESSMENT DOCD: CPT | Mod: CPTII,S$GLB,, | Performed by: INTERNAL MEDICINE

## 2024-04-29 PROCEDURE — 3044F HG A1C LEVEL LT 7.0%: CPT | Mod: CPTII,S$GLB,, | Performed by: INTERNAL MEDICINE

## 2024-04-29 PROCEDURE — 1159F MED LIST DOCD IN RCRD: CPT | Mod: CPTII,S$GLB,, | Performed by: INTERNAL MEDICINE

## 2024-04-29 NOTE — PROGRESS NOTES
Health Maintenance Due   Topic Date Due    Eye Exam  04/26/2024     Triggered LINKS and reconciled immunizations. Updated Care Everywhere. Record request sent to Dr. Ailin Heard asking for a copy of pt's most recent eye exam results. Chart review completed.

## 2024-04-29 NOTE — PROGRESS NOTES
INTERNAL MEDICINE ESTABLISHED PATIENT VISIT NOTE    Subjective:     Chief Complaint: Follow-up, Palpitations, and Cough  Hypertension, diabetes, lab results     Patient ID: Suni Rodríguez is a 63 y.o. female with HTN, type 2 DM uncomplicated, depression c anxiety, GERD, glaucoma, nontoxic MNG (last u/s 3/2020 c mult nodules c plan to repeat u/s in 3 yrs), hx adhesive capsulitis of L shoulder, last seen by me in January for focused visit for postviral cough, here today for follow-up hypertension and diabetes and lab results.    States cough from January mostly improved but still coughs at times.  Worse in AM.  Does have some post nasal drip.  No sneezing.    Today also c/o palpitations x 2 weeks, sx intermittent.  Does drink 2 cups of coffee in the mornings and drinks tea in the evenings.  Denies chest pain or sob.    Presents today c her  Kali.  Has gained weight since last visit.  Admits she has been eating candy in the evenings.    Past Medical History:  Past Medical History:   Diagnosis Date    Adhesive capsulitis of left shoulder     Benign essential HTN     Depression with anxiety     GERD without esophagitis     Glaucoma     Mixed hyperlipidemia     Multinodular goiter (nontoxic)     Uncomplicated type 2 diabetes mellitus        Home Medications:  Prior to Admission medications    Medication Sig Start Date End Date Taking? Authorizing Provider   amLODIPine (NORVASC) 5 MG tablet Take 1 tablet (5 mg total) by mouth once daily. 1/16/24   Jade Chin MD   atorvastatin (LIPITOR) 80 MG tablet Take 1 tablet (80 mg total) by mouth once daily. 1/16/24   Jade Chin MD   cyclobenzaprine (FLEXERIL) 5 MG tablet Take 1 tablet (5 mg total) by mouth 3 (three) times daily as needed for Muscle spasms. 4/22/24 5/2/24  Jade Chin MD   dorzolamide-timolol 2-0.5% (COSOPT) 22.3-6.8 mg/mL ophthalmic solution INSTILL 1 DROP INTO BOTH EYES 2 TIMES DAILY. 3/2/17   Provider, Historical   latanoprost 0.005 % ophthalmic  solution  17   Provider, Historical   lisinopriL (PRINIVIL,ZESTRIL) 40 MG tablet Take 1 tablet (40 mg total) by mouth once daily. 24   Jade Chin MD   metFORMIN (GLUCOPHAGE-XR) 500 MG ER 24hr tablet Take 1 tablet (500 mg total) by mouth once daily. 24   Jade Chin MD   metoprolol succinate (TOPROL-XL) 50 MG 24 hr tablet Take 1 tablet (50 mg total) by mouth once daily. 24   Jade Chin MD   metronidazole 1% (METROGEL) 1 % Gel APPLY TO AFFECTED AREA EVERY DAY 23   Jade Chin MD   multivitamin (THERAGRAN) per tablet Take 1 tablet by mouth once daily.    Provider, Historical   prasterone, dhea, (INTRAROSA) 6.5 mg Inst Place 6.5 mg vaginally every evening. 23   Magda Ramos NP   sertraline (ZOLOFT) 50 MG tablet Take 1 tablet (50 mg total) by mouth once daily. 24   Jade Chin MD   tretinoin (RETIN-A) 0.05 % cream Apply topically every evening. Start 3 times weekly then increase to every night as tolerated. Pea sized amount can cover entire face 11/3/22   Phil Vincent MD       Allergies:  Review of patient's allergies indicates:   Allergen Reactions    Penicillins Hives       Social History:  Social History     Tobacco Use    Smoking status: Former     Current packs/day: 0.00     Average packs/day: 1 pack/day for 23.0 years (23.0 ttl pk-yrs)     Types: Cigarettes     Start date: 1973     Quit date: 1996     Years since quittin.3     Passive exposure: Never    Smokeless tobacco: Never    Tobacco comments:     quit smoking in    Substance Use Topics    Alcohol use: Yes     Alcohol/week: 2.0 standard drinks of alcohol     Types: 2 Glasses of wine per week     Comment: Sometimes special occasions, holidays only    Drug use: No        Review of Systems   Constitutional:  Positive for unexpected weight change (weight gain since last visit). Negative for appetite change, chills, fatigue and fever.   HENT:  Negative for congestion, hearing loss and rhinorrhea.   "  Eyes:  Negative for pain and visual disturbance.   Respiratory:  Positive for cough (as per HPI, dry). Negative for chest tightness, shortness of breath and wheezing.    Cardiovascular:  Positive for palpitations (as per HPI). Negative for chest pain and leg swelling.   Gastrointestinal:  Negative for abdominal distention and abdominal pain.   Endocrine: Negative for polydipsia and polyuria.   Genitourinary:  Negative for decreased urine volume, difficulty urinating, dysuria, hematuria and vaginal discharge.   Neurological:  Negative for weakness, numbness and headaches.   Psychiatric/Behavioral:  Negative for behavioral problems and confusion.          Health Maintenance:     Immunizations:   Influenza 10/2023  TDap is up to date 1/2016  Pneumovax is up to date. 2013 since DM, Prevnar 20 10/2023  Shingrix completed 5/2020, 10/2020  COVID vaccines completed Pfizer 3/2/2021, 3/23/2021, 10/2021, 10/2022, 11/2023.  RSV 01/2024     Cancer Screening:  PAP: 10/2021 NILM c neg HPV c LENCHO Wyatt done 12/2022 c NP Francheska Barker  Mammogram: 10/2023 benign, will order repeat for Oct.  Colonoscopy: 2/2021, polyp removed, path c/w tubular adenoma, rec repeat in 7 yrs.  DEXA:  12/2022 c osteopenia, improved at L spine and stable at hip, can order repeat at f/u.    Objective:   /80 (BP Location: Right arm, Patient Position: Sitting, BP Method: Medium (Manual))   Pulse 80   Ht 5' 4" (1.626 m)   Wt 69.6 kg (153 lb 7 oz)   SpO2 98%   BMI 26.34 kg/m²        General: AAO x3, no apparent distress  CV: RRR, no m/r/g  Pulm: Lungs CTAB, no crackles, no wheezes  Abd: s/NT/ND +BS  Extremities: no c/c/e  Foot exam completed today.  No decreased sensation with monofilament bilaterally.  No ulcerations noted.  No calluses.  2+ distal pulses bilaterally.      Labs:     Lab Results   Component Value Date    WBC 7.25 04/24/2024    HGB 13.3 04/24/2024    HCT 42.0 04/24/2024    MCV 91 04/24/2024     04/24/2024 "     Sodium   Date Value Ref Range Status   04/24/2024 142 136 - 145 mmol/L Final     Potassium   Date Value Ref Range Status   04/24/2024 4.2 3.5 - 5.1 mmol/L Final     Chloride   Date Value Ref Range Status   04/24/2024 106 95 - 110 mmol/L Final     CO2   Date Value Ref Range Status   04/24/2024 26 23 - 29 mmol/L Final     Glucose   Date Value Ref Range Status   04/24/2024 125 (H) 70 - 110 mg/dL Final     BUN   Date Value Ref Range Status   04/24/2024 6 (L) 8 - 23 mg/dL Final     Creatinine   Date Value Ref Range Status   04/24/2024 0.7 0.5 - 1.4 mg/dL Final     Calcium   Date Value Ref Range Status   04/24/2024 10.0 8.7 - 10.5 mg/dL Final     Total Protein   Date Value Ref Range Status   04/24/2024 7.6 6.0 - 8.4 g/dL Final     Albumin   Date Value Ref Range Status   04/24/2024 3.9 3.5 - 5.2 g/dL Final     Total Bilirubin   Date Value Ref Range Status   04/24/2024 0.5 0.1 - 1.0 mg/dL Final     Comment:     For infants and newborns, interpretation of results should be based  on gestational age, weight and in agreement with clinical  observations.    Premature Infant recommended reference ranges:  Up to 24 hours.............<8.0 mg/dL  Up to 48 hours............<12.0 mg/dL  3-5 days..................<15.0 mg/dL  6-29 days.................<15.0 mg/dL       Alkaline Phosphatase   Date Value Ref Range Status   04/24/2024 114 55 - 135 U/L Final     AST   Date Value Ref Range Status   04/24/2024 26 10 - 40 U/L Final     ALT   Date Value Ref Range Status   04/24/2024 33 10 - 44 U/L Final     Anion Gap   Date Value Ref Range Status   04/24/2024 10 8 - 16 mmol/L Final     eGFR if    Date Value Ref Range Status   04/21/2022 >60.0 >60 mL/min/1.73 m^2 Final     eGFR if non    Date Value Ref Range Status   04/21/2022 >60.0 >60 mL/min/1.73 m^2 Final     Comment:     Calculation used to obtain the estimated glomerular filtration  rate (eGFR) is the CKD-EPI equation.        Lab Results   Component  Value Date    HGBA1C 6.5 (H) 04/24/2024     Lab Results   Component Value Date    LDLCALC 64.8 04/24/2024     Lab Results   Component Value Date    TSH 0.887 04/24/2024         Assessment/Plan     Suni was seen today for follow-up, palpitations and cough.    Diagnoses and all orders for this visit:    Type 2 diabetes mellitus with hyperlipidemia  Lab Results   Component Value Date    HGBA1C 6.5 (H) 04/24/2024     Worse on recent labs likely due to intermittent compliance with diet but still at goal.  We will continue metformin.  Advised to stop eating candy and emphasize compliance  -     Comprehensive Metabolic Panel; Future  -     Hemoglobin A1C; Future    Palpitations  As per HPI, we will schedule EKG.  Advised to cut back on her caffeine intake to see if this helps.  If symptoms persist even after cutting back on caffeine, would recommend Holter monitor and possible stress echo, advised to let me know if symptoms do not improve   Recent TSH and lytes within normal limits   -     EKG 12-lead; Future    Mixed hyperlipidemia  Lab Results   Component Value Date    LDLCALC 64.8 04/24/2024     At goal on Lipitor, continue current medications   -     Lipid Panel; Future    Benign essential HTN  At goal, continue current regimen     Multinodular goiter (nontoxic)  As per HPI, not requiring further surveillance ultrasound based on last check     Depression with anxiety  Stable on current regimen, okay to continue medications     Osteopenia of neck of left femur  DEXA up-to-date, repeat due in December, can order at follow-up visit     Chronic cough  Previously presumed to be postviral but may have component of allergic cough secondary to postnasal drip  Recommend over-the-counter Zyrtec or other allergy medication.  Patient declined Flonase as she has difficulty with nasal sprays   We will also check x-ray of the chest for further evaluation   Patient also has GERD, consider PPI later if needed   -     X-Ray Chest PA  And Lateral; Future    Screening mammogram, encounter for  We will order for October   -     Mammo Digital Screening Bilat w/ [place]; Future        HM as above  RTC in 6 months with fasting labs prior, sooner if needed.  Chest x-ray today    Jade Chin MD  Department of Internal Medicine - [facility]  04/29/2024

## 2024-04-29 NOTE — LETTER
AUTHORIZATION FOR RELEASE OF   CONFIDENTIAL INFORMATION    Dear Dr. Ailin Heard,    We are seeing Suni Rodríguez, date of birth 1960, in the clinic at [facility] INTERNAL MEDICINE. Jade Chin MD is the patient's PCP. Suni Rodríguez has an outstanding lab/procedure at the time we reviewed her chart. In order to help keep her health information updated, she has authorized us to request the following medical record(s):        (  )  MAMMOGRAM                                      (  )  COLONOSCOPY      (  )  PAP SMEAR                                          (  )  OUTSIDE LAB RESULTS     (  )  DEXA SCAN                                          ( X )  EYE EXAM            (  )  FOOT EXAM                                          (  )  ENTIRE RECORD     (  )  OUTSIDE IMMUNIZATIONS                 (  )  _______________         Please fax records to Jade Chin MD, [phone]     If you have any questions, please contact DAKOTA Mclean at [phone].           Patient Name: Suni Rodríguez  : 1960  Patient Phone #: [phone]

## 2024-06-10 ENCOUNTER — PATIENT MESSAGE (OUTPATIENT)
Dept: INTERNAL MEDICINE | Facility: CLINIC | Age: 64
End: 2024-06-10
Payer: MEDICARE

## 2024-09-05 ENCOUNTER — OFFICE VISIT (OUTPATIENT)
Dept: URGENT CARE | Facility: CLINIC | Age: 64
End: 2024-09-05
Payer: MEDICARE

## 2024-09-05 VITALS
HEIGHT: 64 IN | WEIGHT: 153 LBS | HEART RATE: 79 BPM | SYSTOLIC BLOOD PRESSURE: 138 MMHG | RESPIRATION RATE: 16 BRPM | TEMPERATURE: 98 F | BODY MASS INDEX: 26.12 KG/M2 | DIASTOLIC BLOOD PRESSURE: 80 MMHG | OXYGEN SATURATION: 98 %

## 2024-09-05 DIAGNOSIS — S29.011A MUSCLE STRAIN OF CHEST WALL, INITIAL ENCOUNTER: ICD-10-CM

## 2024-09-05 DIAGNOSIS — R07.9 CHEST PAIN, UNSPECIFIED TYPE: Primary | ICD-10-CM

## 2024-09-05 PROCEDURE — 71046 X-RAY EXAM CHEST 2 VIEWS: CPT | Mod: S$GLB,,, | Performed by: RADIOLOGY

## 2024-09-05 NOTE — PROGRESS NOTES
"Subjective:      Patient ID: Suni Rodríguez is a 64 y.o. female.    Vitals:  height is 5' 4" (1.626 m) and weight is 69.4 kg (153 lb). Her oral temperature is 98.2 °F (36.8 °C). Her blood pressure is 138/80 and her pulse is 79. Her respiration is 16 and oxygen saturation is 98%.     Chief Complaint: Chest Pain    Patient presents c.o. chest pain.Symps began today ( 1 hour ago). Patient has not taken anything for her symptoms.  Provider note below:  This is a 64 y.o. female who presents today with a chief complaint of chest pain that started 1 hour ago, patient reports earlier today her dad passed out and she was trying to hold him so he will not fall down on the lower, patient reports she attempted to hold her dad from the back and all of his weight was on her chest/right-sided chest and arm, dad transfer to hospital, patient later realized that she started having pain on right-sided chest wall area including her arm that resolved later, denies any radiating neck or arm pain, denies any exertional chest pain, denies any prior history of cardiac issues, denies fever, body aches or chills, denies cough, wheezing or shortness of breath, denies nausea, vomiting, diarrhea or abdominal pain, denies chest pain or dizziness positional lightheadedness, denies sore throat or trouble swallowing, denies loss of taste or smell, or any other symptoms         Chest Pain   The current episode started today. The onset quality is sudden. The problem occurs constantly. The problem has been unchanged. The pain is at a severity of 7/10. The pain is moderate. The quality of the pain is described as sharp. The pain does not radiate. Pertinent negatives include no abdominal pain, back pain, claudication, cough, dizziness, exertional chest pressure, fever, headaches, hemoptysis, irregular heartbeat, leg pain, lower extremity edema, malaise/fatigue, nausea, numbness, orthopnea, palpitations, shortness of breath, syncope, vomiting or " weakness. The pain is aggravated by deep breathing. She has tried nothing for the symptoms. The treatment provided no relief.   Her past medical history is significant for diabetes, hyperlipidemia and sleep apnea.   Pertinent negatives for past medical history include no aneurysm, no anxiety/panic attacks, no aortic aneurysm, no aortic dissection, no bicuspid aortic valve, no CAD, no cancer, no congenital heart disease, no COPD, no DVT, no hyperhomocysteinemia, no Kawasaki disease, no Marfan's syndrome, no MI, no pacemaker, no PVD, no rheumatic fever, no seizures, no sickle cell disease, no spontaneous pneumothorax, no strokes, no thyroid problem, no TIA, Fernandez syndrome and no valve disorder.   Her family medical history is significant for diabetes and heart disease.   Pertinent negatives for family medical history include: no aortic dissection, no connective tissue disease, no hyperlipidemia, no hypertension, no Marfan's syndrome, no PE, no sickle cell disease, no stroke and no sudden death. Prior diagnostic workup includes echocardiogram.       Constitution: Negative for fever.   Cardiovascular:  Positive for chest pain. Negative for palpitations and passing out.   Respiratory:  Negative for cough, bloody sputum, COPD and shortness of breath.    Gastrointestinal:  Negative for abdominal pain, nausea and vomiting.   Musculoskeletal:  Negative for back pain.   Neurological:  Negative for dizziness, headaches, numbness and seizures.      Past Medical History:   Diagnosis Date    Adhesive capsulitis of left shoulder     Benign essential HTN     Depression with anxiety     GERD without esophagitis     Glaucoma     Mixed hyperlipidemia     Multinodular goiter (nontoxic)     Uncomplicated type 2 diabetes mellitus        Objective:     Physical Exam   Constitutional: She is oriented to person, place, and time. She appears well-developed. She is cooperative.  Non-toxic appearance. She does not appear ill. No distress.       Comments:Patient sitting comfortably on the exam table, non toxic appearance  and answering questions appropriately, no acute distress, talking in full sentences without pause        HENT:   Head: Normocephalic and atraumatic.   Ears:   Right Ear: Hearing, tympanic membrane, external ear and ear canal normal.   Left Ear: Hearing, tympanic membrane, external ear and ear canal normal.   Nose: Nose normal. No mucosal edema, rhinorrhea or nasal deformity. No epistaxis. Right sinus exhibits no maxillary sinus tenderness and no frontal sinus tenderness. Left sinus exhibits no maxillary sinus tenderness and no frontal sinus tenderness.   Mouth/Throat: Uvula is midline, oropharynx is clear and moist and mucous membranes are normal. No trismus in the jaw. Normal dentition. No uvula swelling. No posterior oropharyngeal erythema.   Eyes: Conjunctivae and lids are normal. Right eye exhibits no discharge. Left eye exhibits no discharge. No scleral icterus.   Neck: Trachea normal and phonation normal. Neck supple.   Cardiovascular: Normal rate, regular rhythm, normal heart sounds and normal pulses.      Comments: EKG NSR vent rate 62   Pulmonary/Chest: Effort normal and breath sounds normal. No respiratory distress. She exhibits tenderness.       Abdominal: Normal appearance and bowel sounds are normal. She exhibits no distension and no mass. Soft. There is no abdominal tenderness.   Musculoskeletal: Normal range of motion.         General: No deformity. Normal range of motion.   Neurological: She is alert and oriented to person, place, and time. She exhibits normal muscle tone. Coordination normal.   Skin: Skin is warm, dry, intact, not diaphoretic and not pale.   Psychiatric: Her speech is normal and behavior is normal. Judgment and thought content normal.   Nursing note and vitals reviewed.  EKG: normal EKG, normal sinus rhythm.   X-Ray Chest PA And Lateral    Result Date: 9/5/2024  EXAMINATION: XR CHEST PA AND LATERAL  CLINICAL HISTORY: Chest pain, unspecified FINDINGS: Chest two views: Heart size is normal.  Lungs are clear.  The bones bowel gas are noncontributory.     No acute process seen. Electronically signed by: Facundo Serna MD Date:    09/05/2024 Time:    11:37        Patient in no acute distress.  Vitals reassuring.  Discussed results/diagnosis/plan in depth with patient in clinic. Strict precautions given to patient to monitor for worsening signs and symptoms. Advised to follow up with primary.All questions answered. Strict ER precautions given. If your symptoms worsens or fail to improve you should go to the Emergency Room. Discharge and follow-up instructions given verbally/printed. Discharge and follow-up instructions discussed with the patient who expressed understanding and willingness to comply with my recommendations.Patient voiced understanding and in agreement with current treatment plan.     Please be advised this text was dictated with [employer] software and may contain errors due to translation.   Assessment:     1. Chest pain, unspecified type    2. Muscle strain of chest wall, initial encounter        Plan:       Chest pain, unspecified type  -     POCT EKG 12-LEAD (Manually Resulted by Ordering Provider)  -     X-Ray Chest PA And Lateral; Future; Expected date: 09/05/2024    Muscle strain of chest wall, initial encounter          Medical Decision Making:   History:   Old Medical Records: I decided to obtain old medical records.  Old Records Summarized: records from clinic visits and records from previous admission(s).  Urgent Care Management:  Patient in no acute distress.  Vitals reassuring.  On exam, patient is nontoxic appearing and afebrile.  Lungs CTA.  Physical examination consistent with tenderness to palpation to right-sided chest wall and ribcage area.  Patient denies any trauma fall or injury, however reports she started feeling pain after she held her dad so he can not fall and all of his weight  was on her right-sided chest area.  Patient reports pain with deep inspiration.  I discussed with patient in detail most likely strain of her chest wall muscle.  I offered Toradol injection in the clinic patient declined as she would like to go see and visit her dad and will take over-the-counter ibuprofen.  Also discussed with patient in detail that we can not rule out all the possible causes of chest pain in the urgent care setting if her pain does not resolve and worsens she will need to be evaluated in the emergency room for further evaluation   over-the-counter medication discussed with patient at length.  Proper hydration advised.  I reiterated the importance of further evaluation if no improvement symptoms and follow-up with primary. Patient voiced understanding and in agreement with current treatment plan.        Additional MDM:     Heart Failure Score:   COPD = No        Patient Instructions   If your condition worsens or fails to improve we recommend that you receive another evaluation at the ER immediately or contact your PCP to discuss your concerns or return here. You must understand that you've received an urgent care treatment only and that you may be released before all your medical problems are known or treated. You the patient will arrange for followup care as instructed.    If you were prescribed antibiotics, please take them to completion.  If you were prescribed a narcotic medication, do not drive or operate heavy equipment or machinery while taking these medications.  Please follow up with your primary care doctor or specialist as needed.  If you  smoke, please stop smoking.

## 2024-09-25 DIAGNOSIS — Z00.00 ENCOUNTER FOR MEDICARE ANNUAL WELLNESS EXAM: ICD-10-CM

## 2024-10-10 ENCOUNTER — HOSPITAL ENCOUNTER (OUTPATIENT)
Dept: RADIOLOGY | Facility: HOSPITAL | Age: 64
Discharge: HOME | End: 2024-10-10
Attending: INTERNAL MEDICINE
Payer: MEDICARE

## 2024-10-10 DIAGNOSIS — Z12.31 SCREENING MAMMOGRAM, ENCOUNTER FOR: ICD-10-CM

## 2024-10-10 PROCEDURE — 77067 SCR MAMMO BI INCL CAD: CPT | Mod: TC,HCNC

## 2024-10-10 PROCEDURE — 77063 BREAST TOMOSYNTHESIS BI: CPT | Mod: 26,HCNC,, | Performed by: RADIOLOGY

## 2024-10-10 PROCEDURE — 77067 SCR MAMMO BI INCL CAD: CPT | Mod: 26,HCNC,, | Performed by: RADIOLOGY

## 2024-10-31 ENCOUNTER — IMMUNIZATION (OUTPATIENT)
Dept: INTERNAL MEDICINE | Facility: CLINIC | Age: 64
End: 2024-10-31
Payer: MEDICARE

## 2024-10-31 ENCOUNTER — OFFICE VISIT (OUTPATIENT)
Dept: INTERNAL MEDICINE | Facility: CLINIC | Age: 64
End: 2024-10-31
Payer: MEDICARE

## 2024-10-31 VITALS
HEART RATE: 64 BPM | DIASTOLIC BLOOD PRESSURE: 82 MMHG | RESPIRATION RATE: 18 BRPM | BODY MASS INDEX: 26.12 KG/M2 | WEIGHT: 153 LBS | OXYGEN SATURATION: 97 % | HEIGHT: 64 IN | SYSTOLIC BLOOD PRESSURE: 132 MMHG

## 2024-10-31 DIAGNOSIS — M85.852 OSTEOPENIA OF NECK OF LEFT FEMUR: ICD-10-CM

## 2024-10-31 DIAGNOSIS — H40.9 GLAUCOMA, UNSPECIFIED GLAUCOMA TYPE, UNSPECIFIED LATERALITY: ICD-10-CM

## 2024-10-31 DIAGNOSIS — Z00.00 VISIT FOR ANNUAL HEALTH EXAMINATION: Primary | ICD-10-CM

## 2024-10-31 DIAGNOSIS — E78.2 MIXED HYPERLIPIDEMIA: ICD-10-CM

## 2024-10-31 DIAGNOSIS — F41.8 DEPRESSION WITH ANXIETY: ICD-10-CM

## 2024-10-31 DIAGNOSIS — Z01.419 WELL WOMAN EXAM: ICD-10-CM

## 2024-10-31 DIAGNOSIS — E78.5 TYPE 2 DIABETES MELLITUS WITH HYPERLIPIDEMIA: ICD-10-CM

## 2024-10-31 DIAGNOSIS — E11.69 TYPE 2 DIABETES MELLITUS WITH HYPERLIPIDEMIA: ICD-10-CM

## 2024-10-31 DIAGNOSIS — Z23 NEED FOR VACCINATION: Primary | ICD-10-CM

## 2024-10-31 DIAGNOSIS — E04.2 MULTINODULAR GOITER (NONTOXIC): ICD-10-CM

## 2024-10-31 DIAGNOSIS — Z71.89 BEREAVEMENT COUNSELING: ICD-10-CM

## 2024-10-31 DIAGNOSIS — I10 BENIGN ESSENTIAL HTN: ICD-10-CM

## 2024-10-31 PROCEDURE — G0008 ADMIN INFLUENZA VIRUS VAC: HCPCS | Mod: HCNC,S$GLB,, | Performed by: INTERNAL MEDICINE

## 2024-10-31 PROCEDURE — 99999 PR PBB SHADOW E&M-EST. PATIENT-LVL V: CPT | Mod: PBBFAC,HCNC,, | Performed by: INTERNAL MEDICINE

## 2024-10-31 PROCEDURE — 90653 IIV ADJUVANT VACCINE IM: CPT | Mod: HCNC,S$GLB,, | Performed by: INTERNAL MEDICINE

## 2024-11-06 DIAGNOSIS — E78.5 TYPE 2 DIABETES MELLITUS WITH HYPERLIPIDEMIA: ICD-10-CM

## 2024-11-06 DIAGNOSIS — F41.8 DEPRESSION WITH ANXIETY: ICD-10-CM

## 2024-11-06 DIAGNOSIS — E11.69 TYPE 2 DIABETES MELLITUS WITH HYPERLIPIDEMIA: ICD-10-CM

## 2024-11-06 DIAGNOSIS — I10 BENIGN ESSENTIAL HTN: ICD-10-CM

## 2024-11-06 DIAGNOSIS — E78.2 MIXED HYPERLIPIDEMIA: ICD-10-CM

## 2024-11-06 RX ORDER — METFORMIN HYDROCHLORIDE 500 MG/1
500 TABLET, EXTENDED RELEASE ORAL
Qty: 90 TABLET | Refills: 1 | Status: SHIPPED | OUTPATIENT
Start: 2024-11-06

## 2024-11-06 RX ORDER — LISINOPRIL 40 MG/1
40 TABLET ORAL
Qty: 90 TABLET | Refills: 3 | Status: SHIPPED | OUTPATIENT
Start: 2024-11-06

## 2024-11-06 RX ORDER — SERTRALINE HYDROCHLORIDE 50 MG/1
50 TABLET, FILM COATED ORAL
Qty: 90 TABLET | Refills: 3 | Status: SHIPPED | OUTPATIENT
Start: 2024-11-06

## 2024-11-06 RX ORDER — AMLODIPINE BESYLATE 5 MG/1
5 TABLET ORAL
Qty: 90 TABLET | Refills: 3 | Status: SHIPPED | OUTPATIENT
Start: 2024-11-06

## 2024-11-06 RX ORDER — METOPROLOL SUCCINATE 50 MG/1
50 TABLET, EXTENDED RELEASE ORAL
Qty: 90 TABLET | Refills: 3 | Status: SHIPPED | OUTPATIENT
Start: 2024-11-06

## 2024-11-06 RX ORDER — ATORVASTATIN CALCIUM 80 MG/1
80 TABLET, FILM COATED ORAL
Qty: 90 TABLET | Refills: 3 | Status: SHIPPED | OUTPATIENT
Start: 2024-11-06

## 2024-11-06 NOTE — TELEPHONE ENCOUNTER
No care due was identified.  [facility] Embedded Care Due Messages. Reference number: [id number].   11/06/2024 1:58:51 AM CST

## 2024-11-06 NOTE — TELEPHONE ENCOUNTER
Refill Decision Note   Suni Marcos  is requesting a refill authorization.  Brief Assessment and Rationale for Refill:  Approve     Medication Therapy Plan:         Comments:     Note composed:6:07 AM 11/06/2024

## 2024-11-20 ENCOUNTER — OFFICE VISIT (OUTPATIENT)
Dept: OBSTETRICS AND GYNECOLOGY | Facility: CLINIC | Age: 64
End: 2024-11-20
Payer: MEDICARE

## 2024-11-20 VITALS
SYSTOLIC BLOOD PRESSURE: 126 MMHG | BODY MASS INDEX: 26.2 KG/M2 | DIASTOLIC BLOOD PRESSURE: 74 MMHG | WEIGHT: 153.44 LBS | HEIGHT: 64 IN

## 2024-11-20 DIAGNOSIS — Z11.3 ENCOUNTER FOR SCREENING FOR INFECTIONS WITH A PREDOMINANTLY SEXUAL MODE OF TRANSMISSION: ICD-10-CM

## 2024-11-20 DIAGNOSIS — Z12.4 ENCOUNTER FOR SCREENING FOR CERVICAL CANCER: ICD-10-CM

## 2024-11-20 DIAGNOSIS — Z72.89 OTHER PROBLEMS RELATED TO LIFESTYLE: ICD-10-CM

## 2024-11-20 DIAGNOSIS — Z01.419 WELL WOMAN EXAM: Primary | ICD-10-CM

## 2024-11-20 DIAGNOSIS — N89.8 VAGINAL IRRITATION: ICD-10-CM

## 2024-11-20 DIAGNOSIS — N95.2 VAGINAL ATROPHY: ICD-10-CM

## 2024-11-20 PROCEDURE — 0352U VAGINOSIS SCREEN BY DNA PROBE: CPT | Mod: HCNC

## 2024-11-20 PROCEDURE — 87491 CHLMYD TRACH DNA AMP PROBE: CPT | Mod: HCNC

## 2024-11-20 PROCEDURE — 99999 PR PBB SHADOW E&M-EST. PATIENT-LVL III: CPT | Mod: PBBFAC,HCNC,,

## 2024-11-20 RX ORDER — ESTRADIOL 0.1 MG/G
CREAM VAGINAL
Qty: 42.5 G | Refills: 6 | Status: SHIPPED | OUTPATIENT
Start: 2024-11-20

## 2024-11-20 NOTE — PROGRESS NOTES
"History & Physical  Gynecology      SUBJECTIVE:     Chief Complaint: Well Woman         History of Present Illness:  Suni Rodríguez is a 64 y.o. female   for annual routine Pap and checkup. No LMP recorded. Patient is postmenopausal.        denies post menopausal bleeding.   complains of vaginal itching or irritation. See below.  denies vaginal discharge.    She is sexually active with 1 male partners. Does not use condoms. Desires STI screening.    FH:   Breast cancer: daughter  Colon cancer: none  Ovarian cancer: none  Uterine cancer: none    Last pap smear:10/2021 NILM/HPV NEG  History of abnormal pap smears: no    Colonoscopy:2021, rpt in   DEXA: scheduled  Mammogram: 10/2024 Birads1, TC score 9%  STD history: no  OB history: G5, hx SAB, hx stillbirth  Tobacco use: quit in     Patient also presents for a problem visit. She has been having vaginal irritation and discomfort for the past 3 days. She used a medicated douche to relieve her symptoms. They have not gotten better or worse. She recently tried a new laundry detergent prior to the symptoms. She denies vaginal odor or discharge. She would also like to discuss her medication for vaginal dryness. She has been using intrarosa nightly and would like to discuss other options.    Review of Systems:  Review of Systems   Genitourinary:  Positive for vaginal pain (vaginal discomfort/irritation). Negative for vaginal bleeding.        OBJECTIVE:   Vitals:     Vitals:    24 1006   BP: 126/74   Weight: 69.6 kg (153 lb 7 oz)   Height: 5' 4" (1.626 m)        Physical Exam:  Physical Exam  Constitutional:       General: She is not in acute distress.     Appearance: She is well-developed.   HENT:      Head: Normocephalic and atraumatic.   Pulmonary:      Effort: Pulmonary effort is normal.   Chest:   Breasts:     Right: Normal. No inverted nipple, mass, nipple discharge or skin change.      Left: Normal. No inverted nipple, mass, nipple " discharge or skin change.   Abdominal:      General: There is no distension.      Palpations: Abdomen is soft. There is no mass.      Tenderness: There is no abdominal tenderness. There is no guarding or rebound.      Hernia: No hernia is present.   Genitourinary:     Vagina: Vaginal discharge (thick white) present.      Cervix: Normal.      Uterus: Normal.       Adnexa: Right adnexa normal and left adnexa normal.      Comments: Atrophic external female genitalia; vagina pale, atrophic; cervix stenotic, no masses; uterus small mobile nontender; no adnexal masses palpated; rectal deferred.    Musculoskeletal:         General: Normal range of motion.      Cervical back: Normal range of motion.   Neurological:      Mental Status: She is alert and oriented to person, place, and time.   Psychiatric:         Behavior: Behavior normal.         Thought Content: Thought content normal.         Judgment: Judgment normal.           ASSESSMENT:       ICD-10-CM ICD-9-CM    1. Well woman exam  Z01.419 V72.31 Ambulatory referral/consult to Gynecology      2. Vaginal irritation  N89.8 623.9 Vaginosis Screen by DNA Probe      3. Encounter for screening for infections with a predominantly sexual mode of transmission  Z11.3 V74.5 C. trachomatis/N. gonorrhoeae by AMP DNA [facility]; Cervicovaginal      4. Other problems related to lifestyle  Z72.89 V69.8 C. trachomatis/N. gonorrhoeae by AMP DNA [facility]; Cervicovaginal      5. Encounter for screening for cervical cancer  Z12.4 V76.2 Liquid-Based Pap Smear, Screening      HPV High Risk Genotypes, PCR      6. Vaginal atrophy  N95.2 627.3 estradioL (ESTRACE) 0.01 % (0.1 mg/gram) vaginal cream                 Plan:      Suni was seen today for well woman.    Diagnoses and all orders for this visit:    Well woman exam  -     Ambulatory referral/consult to Gynecology  -pap updated today, if normal no longer indicated  -MMG utd  -Cscope utd  -DXA scheduled    Encounter for screening for  infections with a predominantly sexual mode of transmission  -     C. trachomatis/N. gonorrhoeae by AMP DNA [facility]; Cervicovaginal    Other problems related to lifestyle  -     C. trachomatis/N. gonorrhoeae by AMP DNA [facility]; Cervicovaginal    Encounter for screening for cervical cancer  -     Liquid-Based Pap Smear, Screening  -     HPV High Risk Genotypes, PCR    Problem visit:  Vaginal irritation  -     Vaginosis Screen by DNA Probe    Vaginal atrophy  -     estradioL (ESTRACE) 0.01 % (0.1 mg/gram) vaginal cream; 0.5 g of cream intravaginally administered nightly for 2 weeks, then reduce to twice weekly  -Switched from intrarosa to estrace cream      Patient was counseled today on vaginitis prevention including :  a. avoiding feminine products such as deoderant soaps, body wash, bubble bath, douches, scented toilet paper, deoderant tampons or pads, feminine wipes, chronic pad use, etc.  b. avoiding other vulvovaginal irritants such as long hot baths, humidity, tight, synthetic clothing, chlorine and sitting around in wet bathing suits  c. wearing cotton underwear, avoiding thong underwear and no underwear to bed  d. taking showers instead of baths and use a hair dryer on cool setting afterwards to dry  e. wearing cotton to exercise and shower immediately after exercise and change clothes  f. using polyurethane condoms without spermicide if sexually active and symptoms are triggered by intercourse      Orders Placed This Encounter   Procedures    HPV High Risk Genotypes, PCR    Vaginosis Screen by DNA Probe    C. trachomatis/N. gonorrhoeae by AMP DNA [facility]; Cervicovaginal       Follow up in about 1 year (around 11/20/2025) for annual.        DEL Shook  Obstetrics & Gynecology

## 2024-11-20 NOTE — PATIENT INSTRUCTIONS
vaginitis prevention:  a. avoiding feminine products such as deoderant soaps, body wash, bubble bath, douches, scented toilet paper, deoderant tampons or pads, feminine wipes, chronic pad use, etc.  b. avoiding other vulvovaginal irritants such as long hot baths, humidity, tight, synthetic clothing, chlorine and sitting around in wet bathing suits  c. wearing cotton underwear, avoiding thong underwear and no underwear to bed  d. taking showers instead of baths and use a hair dryer on cool setting afterwards to dry  e. wearing cotton to exercise and shower immediately after exercise and change clothes  f. using polyurethane condoms without spermicide if sexually active and symptoms are triggered by intercourse

## 2024-11-22 LAB
BACTERIAL VAGINOSIS DNA: NOT DETECTED
CANDIDA GLABRATA/KRUSEI: NOT DETECTED
CANDIDA RRNA VAG QL PROBE: NOT DETECTED
TRICHOMONAS VAGINALIS: NOT DETECTED

## 2024-11-23 LAB
C TRACH DNA SPEC QL NAA+PROBE: NOT DETECTED
N GONORRHOEA DNA SPEC QL NAA+PROBE: NOT DETECTED

## 2024-12-18 ENCOUNTER — HOSPITAL ENCOUNTER (OUTPATIENT)
Dept: RADIOLOGY | Facility: CLINIC | Age: 64
Discharge: HOME | End: 2024-12-18
Attending: INTERNAL MEDICINE
Payer: MEDICARE

## 2024-12-18 DIAGNOSIS — M85.852 OSTEOPENIA OF NECK OF LEFT FEMUR: ICD-10-CM

## 2024-12-18 PROCEDURE — 77080 DXA BONE DENSITY AXIAL: CPT | Mod: TC,HCNC

## 2024-12-18 PROCEDURE — 77080 DXA BONE DENSITY AXIAL: CPT | Mod: 26,HCNC,, | Performed by: INTERNAL MEDICINE

## 2025-01-29 ENCOUNTER — TELEPHONE (OUTPATIENT)
Dept: INTERNAL MEDICINE | Facility: CLINIC | Age: 65
End: 2025-01-29
Payer: MEDICARE

## 2025-01-29 NOTE — TELEPHONE ENCOUNTER
Spoke to pt.  Discussed services provided through [facility].  Pt would like to defer for now since currently well controlled on her current regimen.

## 2025-01-29 NOTE — TELEPHONE ENCOUNTER
----- Message from Med Assistant Ford sent at 1/29/2025  1:49 PM CST -----  Contact: self [phone]  Please Review. Thank you  ----- Message -----  From: Cheikh Agosto  Sent: 1/29/2025   1:41 PM CST  To: [facility] Staff    Would like to receive medical advice.    Would they like a call back or a response via [facility]:  call back     Additional information: Calling to speak with the office about question she has about the [facility] program for diabetic pts.

## 2025-02-21 DIAGNOSIS — Z00.00 ENCOUNTER FOR MEDICARE ANNUAL WELLNESS EXAM: ICD-10-CM

## 2025-04-07 DIAGNOSIS — E78.5 TYPE 2 DIABETES MELLITUS WITH HYPERLIPIDEMIA: ICD-10-CM

## 2025-04-07 DIAGNOSIS — E78.2 MIXED HYPERLIPIDEMIA: ICD-10-CM

## 2025-04-07 DIAGNOSIS — F41.8 DEPRESSION WITH ANXIETY: ICD-10-CM

## 2025-04-07 DIAGNOSIS — I10 BENIGN ESSENTIAL HTN: ICD-10-CM

## 2025-04-07 DIAGNOSIS — E11.69 TYPE 2 DIABETES MELLITUS WITH HYPERLIPIDEMIA: ICD-10-CM

## 2025-04-07 RX ORDER — ATORVASTATIN CALCIUM 80 MG/1
80 TABLET, FILM COATED ORAL NIGHTLY
Qty: 90 TABLET | Refills: 3 | Status: SHIPPED | OUTPATIENT
Start: 2025-04-07

## 2025-04-07 RX ORDER — SERTRALINE HYDROCHLORIDE 50 MG/1
50 TABLET, FILM COATED ORAL DAILY
Qty: 90 TABLET | Refills: 3 | Status: SHIPPED | OUTPATIENT
Start: 2025-04-07

## 2025-04-07 RX ORDER — LISINOPRIL 40 MG/1
40 TABLET ORAL DAILY
Qty: 90 TABLET | Refills: 3 | Status: SHIPPED | OUTPATIENT
Start: 2025-04-07

## 2025-04-07 RX ORDER — METFORMIN HYDROCHLORIDE 500 MG/1
500 TABLET, EXTENDED RELEASE ORAL 2 TIMES DAILY WITH MEALS
Qty: 90 TABLET | Refills: 3 | Status: SHIPPED | OUTPATIENT
Start: 2025-04-07

## 2025-04-07 RX ORDER — AMLODIPINE BESYLATE 5 MG/1
5 TABLET ORAL DAILY
Qty: 90 TABLET | Refills: 3 | Status: SHIPPED | OUTPATIENT
Start: 2025-04-07

## 2025-04-07 RX ORDER — METOPROLOL SUCCINATE 50 MG/1
50 TABLET, EXTENDED RELEASE ORAL DAILY
Qty: 90 TABLET | Refills: 3 | Status: SHIPPED | OUTPATIENT
Start: 2025-04-07

## 2025-04-07 NOTE — TELEPHONE ENCOUNTER
Care Due:                  Date            Visit Type   Department     Provider  --------------------------------------------------------------------------------                                EP -                              PRIMARY      NOM INTERNAL  Last Visit: 10-      CARE ([facility])   ANDREW Chin                              EP -                              PRIMARY      NOM INTERNAL  Next Visit: 04-      CARE ([facility])   ANDREW Chin                                                            Last  Test          Frequency    Reason                     Performed    Due Date  --------------------------------------------------------------------------------    HBA1C.......  6 months...  metFORMIN................  10-   04-    [employer] Embedded Care Due Messages. Reference number: [id number].   4/07/2025 9:38:29 AM CDT

## 2025-04-16 ENCOUNTER — LAB VISIT (OUTPATIENT)
Dept: LAB | Facility: HOSPITAL | Age: 65
End: 2025-04-16
Attending: INTERNAL MEDICINE
Payer: MEDICARE

## 2025-04-16 DIAGNOSIS — E11.69 TYPE 2 DIABETES MELLITUS WITH HYPERLIPIDEMIA: ICD-10-CM

## 2025-04-16 DIAGNOSIS — E78.2 MIXED HYPERLIPIDEMIA: ICD-10-CM

## 2025-04-16 DIAGNOSIS — E78.5 TYPE 2 DIABETES MELLITUS WITH HYPERLIPIDEMIA: ICD-10-CM

## 2025-04-16 LAB
ABSOLUTE EOSINOPHIL (OHS): 0.08 K/UL
ABSOLUTE MONOCYTE (OHS): 0.46 K/UL (ref 0.3–1)
ABSOLUTE NEUTROPHIL COUNT (OHS): 4.76 K/UL (ref 1.8–7.7)
ALBUMIN SERPL BCP-MCNC: 3.9 G/DL (ref 3.5–5.2)
ALBUMIN/CREAT UR: 3.7 UG/MG
ALP SERPL-CCNC: 105 UNIT/L (ref 40–150)
ALT SERPL W/O P-5'-P-CCNC: 27 UNIT/L (ref 10–44)
ANION GAP (OHS): 9 MMOL/L (ref 8–16)
AST SERPL-CCNC: 23 UNIT/L (ref 11–45)
BASOPHILS # BLD AUTO: 0.03 K/UL
BASOPHILS NFR BLD AUTO: 0.4 %
BILIRUB SERPL-MCNC: 0.4 MG/DL (ref 0.1–1)
BUN SERPL-MCNC: 7 MG/DL (ref 8–23)
CALCIUM SERPL-MCNC: 9.2 MG/DL (ref 8.7–10.5)
CHLORIDE SERPL-SCNC: 107 MMOL/L (ref 95–110)
CHOLEST SERPL-MCNC: 123 MG/DL (ref 120–199)
CHOLEST/HDLC SERPL: 3.6 {RATIO} (ref 2–5)
CO2 SERPL-SCNC: 23 MMOL/L (ref 23–29)
CREAT SERPL-MCNC: 0.7 MG/DL (ref 0.5–1.4)
CREAT UR-MCNC: 216 MG/DL (ref 15–325)
EAG (OHS): 148 MG/DL (ref 68–131)
ERYTHROCYTE [DISTWIDTH] IN BLOOD BY AUTOMATED COUNT: 12.5 % (ref 11.5–14.5)
GFR SERPLBLD CREATININE-BSD FMLA CKD-EPI: >60 ML/MIN/1.73/M2
GLUCOSE SERPL-MCNC: 135 MG/DL (ref 70–110)
HBA1C MFR BLD: 6.8 % (ref 4–5.6)
HCT VFR BLD AUTO: 42.6 % (ref 37–48.5)
HDLC SERPL-MCNC: 34 MG/DL (ref 40–75)
HDLC SERPL: 27.6 % (ref 20–50)
HGB BLD-MCNC: 13.3 GM/DL (ref 12–16)
IMM GRANULOCYTES # BLD AUTO: 0.01 K/UL (ref 0–0.04)
IMM GRANULOCYTES NFR BLD AUTO: 0.1 % (ref 0–0.5)
LDLC SERPL CALC-MCNC: 64 MG/DL (ref 63–159)
LYMPHOCYTES # BLD AUTO: 1.67 K/UL (ref 1–4.8)
MCH RBC QN AUTO: 28.6 PG (ref 27–31)
MCHC RBC AUTO-ENTMCNC: 31.2 G/DL (ref 32–36)
MCV RBC AUTO: 92 FL (ref 82–98)
MICROALBUMIN UR-MCNC: 8 UG/ML (ref ?–5000)
NONHDLC SERPL-MCNC: 89 MG/DL
NUCLEATED RBC (/100WBC) (OHS): 0 /100 WBC
PLATELET # BLD AUTO: 267 K/UL (ref 150–450)
PMV BLD AUTO: 12 FL (ref 9.2–12.9)
POTASSIUM SERPL-SCNC: 4 MMOL/L (ref 3.5–5.1)
PROT SERPL-MCNC: 7.4 GM/DL (ref 6–8.4)
RBC # BLD AUTO: 4.65 M/UL (ref 4–5.4)
RELATIVE EOSINOPHIL (OHS): 1.1 %
RELATIVE LYMPHOCYTE (OHS): 23.8 % (ref 18–48)
RELATIVE MONOCYTE (OHS): 6.6 % (ref 4–15)
RELATIVE NEUTROPHIL (OHS): 68 % (ref 38–73)
SODIUM SERPL-SCNC: 139 MMOL/L (ref 136–145)
TRIGL SERPL-MCNC: 125 MG/DL (ref 30–150)
TSH SERPL-ACNC: 0.84 UIU/ML (ref 0.4–4)
WBC # BLD AUTO: 7.01 K/UL (ref 3.9–12.7)

## 2025-04-16 PROCEDURE — 80053 COMPREHEN METABOLIC PANEL: CPT | Mod: HCNC

## 2025-04-16 PROCEDURE — 80061 LIPID PANEL: CPT | Mod: HCNC

## 2025-04-16 PROCEDURE — 83036 HEMOGLOBIN GLYCOSYLATED A1C: CPT | Mod: HCNC

## 2025-04-16 PROCEDURE — 36415 COLL VENOUS BLD VENIPUNCTURE: CPT | Mod: HCNC

## 2025-04-16 PROCEDURE — 82570 ASSAY OF URINE CREATININE: CPT | Mod: HCNC

## 2025-04-16 PROCEDURE — 84443 ASSAY THYROID STIM HORMONE: CPT | Mod: HCNC

## 2025-04-16 PROCEDURE — 85025 COMPLETE CBC W/AUTO DIFF WBC: CPT | Mod: HCNC

## 2025-04-17 ENCOUNTER — RESULTS FOLLOW-UP (OUTPATIENT)
Dept: INTERNAL MEDICINE | Facility: CLINIC | Age: 65
End: 2025-04-17

## 2025-04-22 NOTE — PROGRESS NOTES
INTERNAL MEDICINE ESTABLISHED PATIENT VISIT NOTE    Subjective:     Chief Complaint: Follow-up  HTN, DM     Patient ID: Suni Rodríguez is a 64 y.o. female with HTN, type 2 DM uncomplicated, depression c anxiety, GERD, glaucoma, nontoxic MNG (last u/s 3/2020 c mult nodules c plan to repeat u/s in 3 yrs), hx adhesive capsulitis of L shoulder, last seen by me in January for focused visit for postviral cough , last seen by me in Oct for annual exam, here today for 6 mo f/u HTN, DM, lab results.    At last visit was having issues c mood/sadness due to the passing of her father but felt she was coping well c support of her  so was continued on her same dose of Sertaline.  However, states she still feels down at times and has had decreased motivation and has gained weight since last visit.    Still on Amlodipine, Lisinopril, and Metoprolol for HTN.  On Lipitor for HLD.  On Metformin for DM.      Past Medical History:  Past Medical History:   Diagnosis Date    Adhesive capsulitis of left shoulder     Benign essential HTN     Depression with anxiety     GERD without esophagitis     Glaucoma     Mixed hyperlipidemia     Multinodular goiter (nontoxic)     Uncomplicated type 2 diabetes mellitus        Home Medications:  Prior to Admission medications    Medication Sig Start Date End Date Taking? Authorizing Provider   amLODIPine (NORVASC) 5 MG tablet Take 1 tablet (5 mg total) by mouth once daily. 4/7/25   Jade Chin MD   atorvastatin (LIPITOR) 80 MG tablet Take 1 tablet (80 mg total) by mouth every evening. 4/7/25   Jade Chin MD   dorzolamide-timolol 2-0.5% (COSOPT) 22.3-6.8 mg/mL ophthalmic solution INSTILL 1 DROP INTO BOTH EYES 2 TIMES DAILY. 3/2/17   Provider, Historical   estradioL (ESTRACE) 0.01 % (0.1 mg/gram) vaginal cream 0.5 g of cream intravaginally administered nightly for 2 weeks, then reduce to twice weekly 11/20/24   Francisca Morse WHNP   latanoprost 0.005 % ophthalmic solution  4/23/17    Provider, Historical   lisinopriL (PRINIVIL,ZESTRIL) 40 MG tablet Take 1 tablet (40 mg total) by mouth once daily. 4/7/25   Jade Chin MD   metFORMIN (GLUCOPHAGE-XR) 500 MG ER 24hr tablet Take 1 tablet (500 mg total) by mouth 2 (two) times daily with meals. 4/7/25   Jade Chin MD   metoprolol succinate (TOPROL-XL) 50 MG 24 hr tablet Take 1 tablet (50 mg total) by mouth once daily. 4/7/25   Jade Chin MD   multivitamin (THERAGRAN) per tablet Take 1 tablet by mouth once daily.    Provider, Historical   sertraline (ZOLOFT) 50 MG tablet Take 1 tablet (50 mg total) by mouth once daily. 4/7/25   Jade Chin MD   tretinoin (RETIN-A) 0.05 % cream Apply topically every evening. Start 3 times weekly then increase to every night as tolerated. Pea sized amount can cover entire face 11/3/22   Phil Vincent MD       Allergies:  Review of patient's allergies indicates:   Allergen Reactions    Penicillins Hives       Social History:  Social History[1]     Review of Systems   Constitutional:  Negative for appetite change, chills, fatigue, fever and unexpected weight change.   HENT:  Negative for congestion, hearing loss and rhinorrhea.    Eyes:  Negative for pain and visual disturbance.   Respiratory:  Negative for cough, chest tightness, shortness of breath and wheezing.    Cardiovascular:  Negative for chest pain, palpitations and leg swelling.   Gastrointestinal:  Negative for abdominal distention and abdominal pain.   Endocrine: Negative for polydipsia and polyuria.   Genitourinary:  Negative for decreased urine volume, difficulty urinating, dysuria, hematuria and vaginal discharge.   Neurological:  Negative for weakness, numbness and headaches.   Psychiatric/Behavioral:  Negative for behavioral problems and confusion.          Health Maintenance:     Immunizations:   Influenza 10/2024  TDap is up to date 1/2016  Pneumovax is up to date. 2013 since DM, Prevnar 20 10/2023  Shingrix completed 5/2020, 10/2020  COVID vaccines  "completed Pfizer 3/2/2021, 3/23/2021, 10/2021, 10/2022, 11/2023, 10/2024  RSV 01/2024     Cancer Screening:  PAP: 10/2021 NILM c neg HPV c LENCHO Wyatt done 12/2022 c NP Francheska [facility]  Mammogram: 10/2024 benign  Colonoscopy: 2/2021, polyp removed, path c/w tubular adenoma, rec repeat in 7 yrs.  DEXA:  12/2024 c osteopenia, worse compared to previous but not yet req meds.  Will repeat in 2 yrs.      Objective:   /72   Pulse 80   Ht 5' 4" (1.626 m)   Wt 72.1 kg (158 lb 15.2 oz)   SpO2 98%   BMI 27.28 kg/m²        General: AAO x3, no apparent distress  HEENT: no cervical LAD, no thyroid masses appreciated  CV: RRR, no m/r/g  Pulm: Lungs CTAB, no crackles, no wheezes  Abd: s/NT/ND +BS  Extremities: no c/c/e  Foot exam completed today.  No decreased sensation with monofilament bilaterally.  No ulcerations noted.  No calluses.  2+ distal pulses bilaterally.      Labs:     Lab Results   Component Value Date    WBC 7.01 04/16/2025    HGB 13.3 04/16/2025    HCT 42.6 04/16/2025    MCV 92 04/16/2025     04/16/2025     Sodium   Date Value Ref Range Status   04/16/2025 139 136 - 145 mmol/L Final   10/10/2024 141 136 - 145 mmol/L Final     Potassium   Date Value Ref Range Status   04/16/2025 4.0 3.5 - 5.1 mmol/L Final   10/10/2024 3.9 3.5 - 5.1 mmol/L Final     Chloride   Date Value Ref Range Status   04/16/2025 107 95 - 110 mmol/L Final   10/10/2024 107 95 - 110 mmol/L Final     CO2   Date Value Ref Range Status   04/16/2025 23 23 - 29 mmol/L Final   10/10/2024 25 23 - 29 mmol/L Final     Glucose   Date Value Ref Range Status   10/10/2024 122 (H) 70 - 110 mg/dL Final     BUN   Date Value Ref Range Status   04/16/2025 7 (L) 8 - 23 mg/dL Final     Creatinine   Date Value Ref Range Status   04/16/2025 0.7 0.5 - 1.4 mg/dL Final     Calcium   Date Value Ref Range Status   04/16/2025 9.2 8.7 - 10.5 mg/dL Final   10/10/2024 9.8 8.7 - 10.5 mg/dL Final     Total Protein   Date Value Ref Range Status "   10/10/2024 7.3 6.0 - 8.4 g/dL Final     Albumin   Date Value Ref Range Status   04/16/2025 3.9 3.5 - 5.2 g/dL Final   10/10/2024 3.9 3.5 - 5.2 g/dL Final     Total Bilirubin   Date Value Ref Range Status   10/10/2024 0.6 0.1 - 1.0 mg/dL Final     Comment:     For infants and newborns, interpretation of results should be based  on gestational age, weight and in agreement with clinical  observations.    Premature Infant recommended reference ranges:  Up to 24 hours.............<8.0 mg/dL  Up to 48 hours............<12.0 mg/dL  3-5 days..................<15.0 mg/dL  6-29 days.................<15.0 mg/dL       Bilirubin Total   Date Value Ref Range Status   04/16/2025 0.4 0.1 - 1.0 mg/dL Final     Comment:     For infants and newborns, interpretation of results should be based   on gestational age, weight and in agreement with clinical   observations.    Premature Infant recommended reference ranges:   0-24 hours:  <8.0 mg/dL   24-48 hours: <12.0 mg/dL   3-5 days:    <15.0 mg/dL   6-29 days:   <15.0 mg/dL     Alkaline Phosphatase   Date Value Ref Range Status   10/10/2024 131 55 - 135 U/L Final     ALP   Date Value Ref Range Status   04/16/2025 105 40 - 150 unit/L Final     AST   Date Value Ref Range Status   04/16/2025 23 11 - 45 unit/L Final   10/10/2024 23 10 - 40 U/L Final     ALT   Date Value Ref Range Status   04/16/2025 27 10 - 44 unit/L Final   10/10/2024 27 10 - 44 U/L Final     Anion Gap   Date Value Ref Range Status   04/16/2025 9 8 - 16 mmol/L Final     eGFR if    Date Value Ref Range Status   04/21/2022 >60.0 >60 mL/min/1.73 m^2 Final     eGFR if non    Date Value Ref Range Status   04/21/2022 >60.0 >60 mL/min/1.73 m^2 Final     Comment:     Calculation used to obtain the estimated glomerular filtration  rate (eGFR) is the CKD-EPI equation.        Lab Results   Component Value Date    HGBA1C 6.8 (H) 04/16/2025     Lab Results   Component Value Date    LDLCALC 58.0 (L)  10/10/2024     Lab Results   Component Value Date    TSH 0.841 2025         Assessment/Plan     Suni was seen today for follow-up.    Diagnoses and all orders for this visit:    Type 2 diabetes mellitus with hyperlipidemia  Lab Results   Component Value Date    HGBA1C 6.8 (H) 2025     Marginally worse on recent labs, weight up since last visit as well  Pt was counseled on the need to avoid intake of simple sugars and minimize carbohydrates.  Also recommended weight loss and daily exercise.  Will cont metformin  Eye exam utd via outside provider  Foot exam done today  Recent urine s microalbuminuria.    Mixed hyperlipidemia  LDL 64 on recent labs.  Cont Lipitor at current dose as well as low fat diet and wt loss.    Benign essential HTN  BP at goal on Lisinopril, Amlodipine, and Toprol  Cont meds    Depression with anxiety  As per HPI  Some persistent dysphoric mood and decreased motivation since her father passed away this past year.  Will cont sertraline and add wellbutrin  Risks and benefits were discussed with patient.  Advised to message me in a month and let me know how she is feeling.  -     buPROPion (WELLBUTRIN XL) 150 MG TB24 tablet; Take 1 tablet (150 mg total) by mouth once daily.      HM as above  RTC in 6 mos for annual exam, sooner if needed.    Jade Chin MD  Department of Internal Medicine - [facility]  2025         [1]   Social History  Tobacco Use    Smoking status: Former     Current packs/day: 0.00     Average packs/day: 1 pack/day for 23.0 years (23.0 ttl pk-yrs)     Types: Cigarettes     Start date: 1973     Quit date: 1996     Years since quittin.3     Passive exposure: Never    Smokeless tobacco: Never    Tobacco comments:     quit smoking in    Substance Use Topics    Alcohol use: Yes     Alcohol/week: 2.0 standard drinks of alcohol     Types: 2 Glasses of wine per week     Comment: Sometimes special occasions, holidays only    Drug use: No

## 2025-04-23 ENCOUNTER — OFFICE VISIT (OUTPATIENT)
Dept: INTERNAL MEDICINE | Facility: CLINIC | Age: 65
End: 2025-04-23
Payer: MEDICARE

## 2025-04-23 VITALS
DIASTOLIC BLOOD PRESSURE: 72 MMHG | OXYGEN SATURATION: 98 % | SYSTOLIC BLOOD PRESSURE: 124 MMHG | HEART RATE: 80 BPM | BODY MASS INDEX: 27.13 KG/M2 | WEIGHT: 158.94 LBS | HEIGHT: 64 IN

## 2025-04-23 DIAGNOSIS — E78.5 TYPE 2 DIABETES MELLITUS WITH HYPERLIPIDEMIA: ICD-10-CM

## 2025-04-23 DIAGNOSIS — E11.69 TYPE 2 DIABETES MELLITUS WITH HYPERLIPIDEMIA: Primary | ICD-10-CM

## 2025-04-23 DIAGNOSIS — I10 BENIGN ESSENTIAL HTN: ICD-10-CM

## 2025-04-23 DIAGNOSIS — F41.8 DEPRESSION WITH ANXIETY: ICD-10-CM

## 2025-04-23 DIAGNOSIS — E78.2 MIXED HYPERLIPIDEMIA: ICD-10-CM

## 2025-04-23 DIAGNOSIS — E11.69 TYPE 2 DIABETES MELLITUS WITH HYPERLIPIDEMIA: ICD-10-CM

## 2025-04-23 DIAGNOSIS — E78.5 TYPE 2 DIABETES MELLITUS WITH HYPERLIPIDEMIA: Primary | ICD-10-CM

## 2025-04-23 DIAGNOSIS — I10 BENIGN ESSENTIAL HTN: Primary | ICD-10-CM

## 2025-04-23 PROCEDURE — 99999 PR PBB SHADOW E&M-EST. PATIENT-LVL IV: CPT | Mod: PBBFAC,HCNC,, | Performed by: INTERNAL MEDICINE

## 2025-04-23 PROCEDURE — 3008F BODY MASS INDEX DOCD: CPT | Mod: HCNC,CPTII,S$GLB, | Performed by: INTERNAL MEDICINE

## 2025-04-23 PROCEDURE — 3044F HG A1C LEVEL LT 7.0%: CPT | Mod: HCNC,CPTII,S$GLB, | Performed by: INTERNAL MEDICINE

## 2025-04-23 PROCEDURE — 1126F AMNT PAIN NOTED NONE PRSNT: CPT | Mod: HCNC,CPTII,S$GLB, | Performed by: INTERNAL MEDICINE

## 2025-04-23 PROCEDURE — 3061F NEG MICROALBUMINURIA REV: CPT | Mod: HCNC,CPTII,S$GLB, | Performed by: INTERNAL MEDICINE

## 2025-04-23 PROCEDURE — 3288F FALL RISK ASSESSMENT DOCD: CPT | Mod: HCNC,CPTII,S$GLB, | Performed by: INTERNAL MEDICINE

## 2025-04-23 PROCEDURE — 3074F SYST BP LT 130 MM HG: CPT | Mod: HCNC,CPTII,S$GLB, | Performed by: INTERNAL MEDICINE

## 2025-04-23 PROCEDURE — 3066F NEPHROPATHY DOC TX: CPT | Mod: HCNC,CPTII,S$GLB, | Performed by: INTERNAL MEDICINE

## 2025-04-23 PROCEDURE — 1159F MED LIST DOCD IN RCRD: CPT | Mod: HCNC,CPTII,S$GLB, | Performed by: INTERNAL MEDICINE

## 2025-04-23 PROCEDURE — 3078F DIAST BP <80 MM HG: CPT | Mod: HCNC,CPTII,S$GLB, | Performed by: INTERNAL MEDICINE

## 2025-04-23 PROCEDURE — 4010F ACE/ARB THERAPY RXD/TAKEN: CPT | Mod: HCNC,CPTII,S$GLB, | Performed by: INTERNAL MEDICINE

## 2025-04-23 PROCEDURE — 99214 OFFICE O/P EST MOD 30 MIN: CPT | Mod: HCNC,S$GLB,, | Performed by: INTERNAL MEDICINE

## 2025-04-23 PROCEDURE — 1101F PT FALLS ASSESS-DOCD LE1/YR: CPT | Mod: HCNC,CPTII,S$GLB, | Performed by: INTERNAL MEDICINE

## 2025-04-23 PROCEDURE — 1160F RVW MEDS BY RX/DR IN RCRD: CPT | Mod: HCNC,CPTII,S$GLB, | Performed by: INTERNAL MEDICINE

## 2025-04-23 RX ORDER — BUPROPION HYDROCHLORIDE 150 MG/1
150 TABLET ORAL DAILY
Qty: 90 TABLET | Refills: 3 | Status: SHIPPED | OUTPATIENT
Start: 2025-04-23 | End: 2026-04-23

## 2025-04-30 ENCOUNTER — TELEPHONE (OUTPATIENT)
Dept: OBSTETRICS AND GYNECOLOGY | Facility: CLINIC | Age: 65
End: 2025-04-30
Payer: MEDICARE

## 2025-04-30 NOTE — TELEPHONE ENCOUNTER
Called pt in regards to scheduling appt due to medication change, no answer, lvm asking pt to call office.

## 2025-04-30 NOTE — TELEPHONE ENCOUNTER
----- Message from Med Assistant Canela sent at 4/28/2025  5:13 PM CDT -----  Letty Yanes StaffCaller: Unspecified (Today, 12:12 PM)Name of Who is Calling:ptWhat is the request in detail: pt has been taking rx estradioL (ESTRACE) 0.01 % (0.1 mg/gram) vaginal cream/  She is asking if it is possible for her to start to take estradiol in pill form. If yes she asks that it be the generic so her insurance will cover it .;[facility] - [facility] [address]: [phone]: [phone] call pt if the pill form is not advisable for her. Please assistCan the clinic reply by [facility]:call

## 2025-05-31 ENCOUNTER — OFFICE VISIT (OUTPATIENT)
Dept: URGENT CARE | Facility: CLINIC | Age: 65
End: 2025-05-31
Payer: MEDICARE

## 2025-05-31 VITALS
BODY MASS INDEX: 26.98 KG/M2 | DIASTOLIC BLOOD PRESSURE: 72 MMHG | RESPIRATION RATE: 18 BRPM | SYSTOLIC BLOOD PRESSURE: 110 MMHG | HEART RATE: 86 BPM | HEIGHT: 64 IN | WEIGHT: 158 LBS | OXYGEN SATURATION: 97 % | TEMPERATURE: 98 F

## 2025-05-31 DIAGNOSIS — J06.9 VIRAL URI WITH COUGH: Primary | ICD-10-CM

## 2025-05-31 PROCEDURE — 99213 OFFICE O/P EST LOW 20 MIN: CPT | Mod: S$GLB,,, | Performed by: NURSE PRACTITIONER

## 2025-05-31 RX ORDER — BENZONATATE 200 MG/1
200 CAPSULE ORAL 3 TIMES DAILY PRN
Qty: 30 CAPSULE | Refills: 0 | Status: SHIPPED | OUTPATIENT
Start: 2025-05-31 | End: 2025-06-10

## 2025-05-31 RX ORDER — ALBUTEROL SULFATE 90 UG/1
2 INHALANT RESPIRATORY (INHALATION) EVERY 6 HOURS PRN
Qty: 18 G | Refills: 0 | Status: SHIPPED | OUTPATIENT
Start: 2025-05-31

## 2025-05-31 NOTE — PROGRESS NOTES
"Subjective:      Patient ID: Suni Rodríguez is a 64 y.o. female.    Vitals:  height is 5' 4" (1.626 m) and weight is 71.7 kg (158 lb). Her tympanic temperature is 98.4 °F (36.9 °C). Her blood pressure is 110/72 and her pulse is 86. Her respiration is 18 and oxygen saturation is 97%.     Chief Complaint: Cough    63 y/o female c/o with a cough that's been since Wednesday night. Patient states it started off as a sore throat Patient states she took OTC meds and teas.   Provider note below:  This is a 64 y.o. female who presents today with a chief complaint of cough started on Wednesday, patient reports initially started as a sore throat, patient reports mild wheezing, denies fever, body aches or chills, denies  shortness of breath, denies orthopnea, denies any leg or ankle swelling, denies nausea, vomiting, diarrhea or abdominal pain, denies chest pain or dizziness positional lightheadedness, denies sore throat or trouble swallowing, denies loss of taste or smell, or any other symptoms       Cough  This is a new problem. The current episode started in the past 7 days. The problem has been gradually worsening. The problem occurs constantly. Associated symptoms include postnasal drip and a sore throat. Pertinent negatives include no chest pain, chills, ear congestion, ear pain, fever, headaches, heartburn, hemoptysis, myalgias, nasal congestion, rash, rhinorrhea, shortness of breath, sweats, weight loss or wheezing. Nothing aggravates the symptoms. She has tried OTC cough suppressant for the symptoms. The treatment provided no relief. Her past medical history is significant for environmental allergies. There is no history of asthma, bronchiectasis, bronchitis, COPD, emphysema or pneumonia.       Constitution: Negative for chills and fever.   HENT:  Positive for postnasal drip and sore throat. Negative for ear pain.    Cardiovascular:  Negative for chest pain.   Respiratory:  Positive for cough. Negative for bloody " discussed with RN. Looks like normal umbilical hernia. As long as it is reducible, no intervention needed. usually outgrown by age 2-3 yo.   sputum, shortness of breath and wheezing.    Gastrointestinal:  Negative for heartburn.   Musculoskeletal:  Negative for muscle ache.   Skin:  Negative for rash.   Allergic/Immunologic: Positive for environmental allergies.   Neurological:  Negative for headaches.      Past Medical History:   Diagnosis Date    Adhesive capsulitis of left shoulder     Benign essential HTN     Depression with anxiety     GERD without esophagitis     Glaucoma     Mixed hyperlipidemia     Multinodular goiter (nontoxic)     Uncomplicated type 2 diabetes mellitus        Objective:     Physical Exam   Constitutional: She is oriented to person, place, and time. She appears well-developed. She is cooperative.  Non-toxic appearance. She does not appear ill. No distress.      Comments:Patient sitting comfortably on the exam table, non toxic appearance  and answering questions appropriately, no acute distress        HENT:   Head: Normocephalic and atraumatic.   Ears:   Right Ear: Hearing, tympanic membrane, external ear and ear canal normal.   Left Ear: Hearing, tympanic membrane, external ear and ear canal normal.   Nose: Nose normal. No mucosal edema, rhinorrhea or nasal deformity. No epistaxis. Right sinus exhibits no maxillary sinus tenderness and no frontal sinus tenderness. Left sinus exhibits no maxillary sinus tenderness and no frontal sinus tenderness.   Mouth/Throat: Uvula is midline, oropharynx is clear and moist and mucous membranes are normal. No trismus in the jaw. Normal dentition. No uvula swelling. No oropharyngeal exudate, posterior oropharyngeal edema or posterior oropharyngeal erythema.   Eyes: Conjunctivae and lids are normal. No scleral icterus.   Neck: Trachea normal and phonation normal. Neck supple. No edema present. No erythema present. No neck rigidity present.   Cardiovascular: Normal rate, regular rhythm, normal heart sounds and normal pulses.   Pulmonary/Chest: Effort normal and breath sounds normal. No stridor. No  respiratory distress. She has no decreased breath sounds. She has no wheezes. She has no rhonchi. She has no rales.   Lungs CTA         Comments: Lungs CTA    Abdominal: Normal appearance.   Musculoskeletal: Normal range of motion.         General: No deformity. Normal range of motion.   Neurological: She is alert and oriented to person, place, and time. She exhibits normal muscle tone. Coordination normal.   Skin: Skin is warm, dry, intact, not diaphoretic and not pale.   Psychiatric: Her speech is normal and behavior is normal. Judgment and thought content normal.   Nursing note and vitals reviewed.        Patient in no acute distress.  Vitals reassuring.  Discussed results/diagnosis/plan in depth with patient in clinic. Strict precautions given to patient to monitor for worsening signs and symptoms. Advised to follow up with primary.All questions answered. Strict ER precautions given. If your symptoms worsens or fail to improve you should go to the Emergency Room. Discharge and follow-up instructions given verbally/printed. Discharge and follow-up instructions discussed with the patient who expressed understanding and willingness to comply with my recommendations.Patient voiced understanding and in agreement with current treatment plan.     Please be advised this text was dictated with TechLoan[employer] software and may contain errors due to translation.   Assessment:     1. Viral URI with cough        Plan:       Viral URI with cough    Other orders  -     benzonatate (TESSALON) 200 MG capsule; Take 1 capsule (200 mg total) by mouth 3 (three) times daily as needed for Cough.  Dispense: 30 capsule; Refill: 0  -     albuterol (VENTOLIN HFA) 90 mcg/actuation inhaler; Inhale 2 puffs into the lungs every 6 (six) hours as needed for Wheezing. Rescue  Dispense: 18 g; Refill: 0          Medical Decision Making:   Urgent Care Management:  Patient in no acute distress.  Vitals reassuring.  On exam, patient is nontoxic appearing and  afebrile.  Lungs CTA.  Medication prescribed and over-the-counter medication discussed with patient at length.  Proper hydration advised.  I reiterated the importance of further evaluation if no improvement symptoms and follow-up with primary. Patient voiced understanding and in agreement with current treatment plan.             Patient Instructions   PLEASE READ YOUR DISCHARGE INSTRUCTIONS ENTIRELY AS IT CONTAINS IMPORTANT INFORMATION.      Please drink plenty of fluids.    Please get plenty of rest.    Please return here or go to the Emergency Department for any concerns or worsening of condition.    Please take an over the counter antihistamine medication (allegra/Claritin/Zyrtec) of your choice as directed.    Try an over the counter decongestant like Mucinex D or Sudafed. You buy this behind the pharmacy counter    If you do have Hypertension or palpitations, it is safe to take Coricidin HBP for relief of sinus symptoms.    If not allergic, please take over the counter Tylenol (Acetaminophen) and/or Motrin (Ibuprofen) as directed for control of pain and/or fever.  Please follow up with your primary care doctor or specialist as needed.    Sore throat recommendations: Warm fluids, warm salt water gargles, throat lozenges, tea, honey, soup, rest, hydration.    Use over the counter flonase: one spray each nostril twice daily OR two sprays each nostril once daily.     If you  smoke, please stop smoking.      Please return or see your primary care doctor if you develop new or worsening symptoms.     Please arrange follow up with your primary medical clinic as soon as possible. You must understand that you've received an Urgent Care treatment only and that you may be released before all of your medical problems are known or treated. You, the patient, will arrange for follow up as instructed. If your symptoms worsen or fail to improve you should go to the Emergency Room.

## 2025-06-28 DIAGNOSIS — E78.5 TYPE 2 DIABETES MELLITUS WITH HYPERLIPIDEMIA: ICD-10-CM

## 2025-06-28 DIAGNOSIS — E11.69 TYPE 2 DIABETES MELLITUS WITH HYPERLIPIDEMIA: ICD-10-CM

## 2025-06-28 NOTE — TELEPHONE ENCOUNTER
No care due was identified.  [facility] Embedded Care Due Messages. Reference number: [id number].   6/28/2025 1:30:38 AM CDT

## 2025-06-28 NOTE — TELEPHONE ENCOUNTER
Refill Routing Note   Medication(s) are not appropriate for processing by [facility] for the following reason(s):        New or recently adjusted medication    [facility] action(s):  Defer             Appointments  past 12m or future 3m with PCP    Date Provider   Last Visit   4/23/2025 Jade Chin MD   Next Visit   10/28/2025 Jade Chin MD   ED visits in past 90 days: 0        Note composed:4:19 PM 06/28/2025

## 2025-06-29 RX ORDER — METFORMIN HYDROCHLORIDE 500 MG/1
500 TABLET, EXTENDED RELEASE ORAL 2 TIMES DAILY WITH MEALS
Qty: 180 TABLET | Refills: 1 | Status: SHIPPED | OUTPATIENT
Start: 2025-06-29

## 2025-07-25 ENCOUNTER — NURSE TRIAGE (OUTPATIENT)
Dept: ADMINISTRATIVE | Facility: CLINIC | Age: 65
End: 2025-07-25
Payer: MEDICARE

## 2025-07-25 NOTE — TELEPHONE ENCOUNTER
Call transferred from patient access. States had a miss step. Feels like she twisted her hip. This is on her right side. Triage done- dispo see provider today or tomorrow. Feels she cannot go today. No appointments noted for tomorrow. She will go to  if needed over the weekend but would like appointment made for Monday. Appointment made for Monday. Time, provider and location verified with patient. Verb understanding. Reason for Disposition   MODERATE pain (e.g., interferes with normal activities, limping) and high-risk adult (e.g., age > 60 years, osteoporosis, chronic steroid use)    Additional Information   Negative: Looks like a broken bone or dislocated joint (e.g., crooked or deformed)   Negative: Sounds like a life-threatening emergency to the triager   Followed a hip injury   Negative: Major bleeding (actively dripping or spurting) that can't be stopped   Negative: Bullet, stabbed by knife or other serious penetrating wound   Negative: Looks like a dislocated joint (crooked or deformed)   Negative: Can't stand (bear weight) or walk   Negative: Serious injury with multiple fractures (broken bones)   Negative: Sounds like a life-threatening emergency to the triager   Negative: SEVERE pain (e.g., excruciating)   Negative: Skin is split open or gaping (length > 1/2 inch or 12 mm)   Negative: Bleeding won't stop after 10 minutes of direct pressure (using correct technique)   Negative: Dirt in the wound and not removed after 15 minutes of scrubbing   Negative: New numbness (loss of sensation) or weakness of foot or toe(s) and present now   Negative: Sounds like a serious injury to the triager   Negative: No prior tetanus shots (or is not fully vaccinated) and any wound (e.g., cut or scrape)   Negative: HIV positive or severe immunodeficiency (severely weak immune system) and DIRTY cut or scrape   Negative: Patient wants to be seen    Protocols used: Leg Pain-A-OH, Hip Injury-A-OH

## 2025-07-28 ENCOUNTER — HOSPITAL ENCOUNTER (OUTPATIENT)
Dept: RADIOLOGY | Facility: HOSPITAL | Age: 65
Discharge: HOME | End: 2025-07-28
Attending: NURSE PRACTITIONER
Payer: MEDICARE

## 2025-07-28 ENCOUNTER — OFFICE VISIT (OUTPATIENT)
Dept: INTERNAL MEDICINE | Facility: CLINIC | Age: 65
End: 2025-07-28
Payer: MEDICARE

## 2025-07-28 ENCOUNTER — RESULTS FOLLOW-UP (OUTPATIENT)
Dept: INTERNAL MEDICINE | Facility: CLINIC | Age: 65
End: 2025-07-28

## 2025-07-28 VITALS
BODY MASS INDEX: 27.28 KG/M2 | SYSTOLIC BLOOD PRESSURE: 122 MMHG | OXYGEN SATURATION: 97 % | DIASTOLIC BLOOD PRESSURE: 68 MMHG | HEIGHT: 64 IN | HEART RATE: 81 BPM | WEIGHT: 159.81 LBS

## 2025-07-28 DIAGNOSIS — M54.50 ACUTE RIGHT-SIDED LOW BACK PAIN, UNSPECIFIED WHETHER SCIATICA PRESENT: ICD-10-CM

## 2025-07-28 DIAGNOSIS — M54.50 ACUTE RIGHT-SIDED LOW BACK PAIN, UNSPECIFIED WHETHER SCIATICA PRESENT: Primary | ICD-10-CM

## 2025-07-28 DIAGNOSIS — E11.69 TYPE 2 DIABETES MELLITUS WITH HYPERLIPIDEMIA: ICD-10-CM

## 2025-07-28 DIAGNOSIS — E78.5 TYPE 2 DIABETES MELLITUS WITH HYPERLIPIDEMIA: ICD-10-CM

## 2025-07-28 PROCEDURE — 3288F FALL RISK ASSESSMENT DOCD: CPT | Mod: CPTII,HCNC,S$GLB, | Performed by: NURSE PRACTITIONER

## 2025-07-28 PROCEDURE — 3008F BODY MASS INDEX DOCD: CPT | Mod: CPTII,HCNC,S$GLB, | Performed by: NURSE PRACTITIONER

## 2025-07-28 PROCEDURE — 72100 X-RAY EXAM L-S SPINE 2/3 VWS: CPT | Mod: TC,HCNC

## 2025-07-28 PROCEDURE — 1101F PT FALLS ASSESS-DOCD LE1/YR: CPT | Mod: CPTII,HCNC,S$GLB, | Performed by: NURSE PRACTITIONER

## 2025-07-28 PROCEDURE — 72100 X-RAY EXAM L-S SPINE 2/3 VWS: CPT | Mod: 26,HCNC,, | Performed by: RADIOLOGY

## 2025-07-28 PROCEDURE — 99214 OFFICE O/P EST MOD 30 MIN: CPT | Mod: HCNC,S$GLB,, | Performed by: NURSE PRACTITIONER

## 2025-07-28 PROCEDURE — 3066F NEPHROPATHY DOC TX: CPT | Mod: CPTII,HCNC,S$GLB, | Performed by: NURSE PRACTITIONER

## 2025-07-28 PROCEDURE — 3061F NEG MICROALBUMINURIA REV: CPT | Mod: CPTII,HCNC,S$GLB, | Performed by: NURSE PRACTITIONER

## 2025-07-28 PROCEDURE — 3074F SYST BP LT 130 MM HG: CPT | Mod: CPTII,HCNC,S$GLB, | Performed by: NURSE PRACTITIONER

## 2025-07-28 PROCEDURE — 3078F DIAST BP <80 MM HG: CPT | Mod: CPTII,HCNC,S$GLB, | Performed by: NURSE PRACTITIONER

## 2025-07-28 PROCEDURE — 99999 PR PBB SHADOW E&M-EST. PATIENT-LVL III: CPT | Mod: PBBFAC,HCNC,, | Performed by: NURSE PRACTITIONER

## 2025-07-28 PROCEDURE — 1125F AMNT PAIN NOTED PAIN PRSNT: CPT | Mod: CPTII,HCNC,S$GLB, | Performed by: NURSE PRACTITIONER

## 2025-07-28 PROCEDURE — 4010F ACE/ARB THERAPY RXD/TAKEN: CPT | Mod: CPTII,HCNC,S$GLB, | Performed by: NURSE PRACTITIONER

## 2025-07-28 PROCEDURE — 3044F HG A1C LEVEL LT 7.0%: CPT | Mod: CPTII,HCNC,S$GLB, | Performed by: NURSE PRACTITIONER

## 2025-07-28 RX ORDER — METHOCARBAMOL 500 MG/1
500 TABLET, FILM COATED ORAL 3 TIMES DAILY PRN
Qty: 30 TABLET | Refills: 1 | Status: SHIPPED | OUTPATIENT
Start: 2025-07-28

## 2025-07-28 RX ORDER — POLYVINYL ALCOHOL 14 MG/ML
1 SOLUTION/ DROPS OPHTHALMIC
COMMUNITY

## 2025-07-28 RX ORDER — MELOXICAM 15 MG/1
15 TABLET ORAL DAILY PRN
Qty: 30 TABLET | Refills: 1 | Status: SHIPPED | OUTPATIENT
Start: 2025-07-28

## 2025-07-28 NOTE — PROGRESS NOTES
"Subjective     Patient ID: Suni Rodríguez is a 64 y.o. female.  /68 (BP Location: Right arm, Patient Position: Sitting)   Pulse 81   Ht 5' 4" (1.626 m)   Wt 72.5 kg (159 lb 13.3 oz)   SpO2 97%   BMI 27.44 kg/m²        History of Present Illness    CHIEF COMPLAINT:  Suni presents today for right leg pain.    HISTORY OF PRESENT ILLNESS:  She reports right leg and back pain for approximately one month, which began after stepping off a curb. Pain is located in the lower back, right thigh, buttock, and radiates down the leg to the ankle, with one instance reaching the heel. She describes the pain as sharp and radiating, extending from the left lower back down the right buttock, all the way to the back of her L ankle. Pain worsens with walking, getting up, and sitting down, with the most pronounced discomfort during ambulation and moderate discomfort during positional changes. She has attempted self-treatment with OTC ibuprofen and aspirin without significant relief. She previously received muscle relaxers, which may have helped some.     MEDICAL HISTORY:  She has a history of diabetes and previous physical therapy for neck and shoulder pain on the right side.      ROS:  Gastrointestinal: -bowel incontinence  Genitourinary: -urinary incontinence  Musculoskeletal: +back pain, +limb pain, -shooting pain sensation, +pain with movement  Neurological: -numbness         Problem List[1]     Current Medications[2]          Objective     Physical Exam  Constitutional:       General: She is not in acute distress.     Appearance: Normal appearance. She is not ill-appearing, toxic-appearing or diaphoretic.   HENT:      Head: Normocephalic and atraumatic.   Cardiovascular:      Rate and Rhythm: Normal rate.   Pulmonary:      Effort: Pulmonary effort is normal. No respiratory distress.   Abdominal:      General: Abdomen is flat.   Musculoskeletal:         General: Tenderness (R lumbar) present. No swelling or " deformity.      Right lower leg: No edema.      Left lower leg: No edema.   Skin:     General: Skin is dry.   Neurological:      General: No focal deficit present.      Mental Status: She is alert and oriented to person, place, and time.      Motor: No weakness.   Psychiatric:         Mood and Affect: Mood normal.         Behavior: Behavior normal.        Assessment and Plan     1. Acute right-sided low back pain, unspecified whether sciatica present  -     X-Ray Lumbar Spine 2 Or 3 Views; Future; Expected date: 07/28/2025  -     Ambulatory Referral/Consult to Physical Therapy  -     methocarbamoL (ROBAXIN) 500 MG Tab; Take 1 tablet (500 mg total) by mouth 3 (three) times daily as needed (muslce spasm).  Dispense: 30 tablet; Refill: 1  -     meloxicam (MOBIC) 15 MG tablet; Take 1 tablet (15 mg total) by mouth daily as needed for Pain. Take with food  Dispense: 30 tablet; Refill: 1    - Assessed patient's right leg pain radiating from lower back to ankle for about 1 month.  - Straight leg test showed tightness but no shooting pain, suggesting no immediate signs of bulging disc.  - Examination revealed tenderness around right SI joint.  - Ordered lumbar spine x-ray to check for structural issues like fractures, though explained x-rays primarily show bone structure and spacing, not soft tissue.  - Referred patient to physical therapy for muscle retraining and strengthening as initial treatment approach.  - Prescribed Robaxin (methocarbamol) 500 mg 3 times daily as needed for muscle spasms and Mobic (meloxicam) 15 mg daily with food for pain, with instructions to stay hydrated.  - Discontinued previously prescribed ibuprofen 800 mg and OTC NSAIDs (Motrin, Advil, Aleve, Goody Powder).  - Advised that acetaminophen (Tylenol) can be used for additional pain relief if needed.  - Outlined care progression: starting with x-rays and physical therapy, potentially moving to orthopedic referral and MRI if symptoms persist after  6-8 weeks, noting insurance typically requires 6-8 weeks of physical therapy before approving MRI.  - Instructed patient to contact office if symptoms worsen or if no improvement occurs with current treatment plan.  - Advised on warning signs requiring immediate emergency department attention: loss of bowel/bladder function or numbness/tingling in genital region.    2. Type 2 diabetes mellitus with hyperlipidemia  - Suni to avoid soaking in baths due to risk of yeast infection in setting of type 2 diabetes.           Trevon Shin NP   Internal Medicine           Follow up if symptoms worsen or fail to improve.    This note was generated with the assistance of ambient listening technology. Verbal consent was obtained by the patient and accompanying visitor(s) for the recording of patient appointment to facilitate this note. I attest to having reviewed and edited the generated note for accuracy, though some syntax or spelling errors may persist. Please contact the author of this note for any clarification.           [1]   Patient Active Problem List  Diagnosis    Type 2 diabetes mellitus with hyperlipidemia    Benign essential HTN    Mixed hyperlipidemia    Depression with anxiety    Multinodular goiter (nontoxic), last u/s 10/2023 not req FNA or surveillance    GERD without esophagitis    Glaucoma    Adhesive capsulitis of left shoulder    Foraminal stenosis of cervical region    Osteopenia of neck of left femur    Bereavement counseling   [2]   Current Outpatient Medications   Medication Sig Dispense Refill    amLODIPine (NORVASC) 5 MG tablet Take 1 tablet (5 mg total) by mouth once daily. 90 tablet 3    atorvastatin (LIPITOR) 80 MG tablet Take 1 tablet (80 mg total) by mouth every evening. 90 tablet 3    buPROPion (WELLBUTRIN XL) 150 MG TB24 tablet Take 1 tablet (150 mg total) by mouth once daily. 90 tablet 3    dorzolamide-timolol 2-0.5% (COSOPT) 22.3-6.8 mg/mL ophthalmic solution INSTILL 1 DROP INTO BOTH  EYES 2 TIMES DAILY.  5    latanoprost 0.005 % ophthalmic solution       lisinopriL (PRINIVIL,ZESTRIL) 40 MG tablet Take 1 tablet (40 mg total) by mouth once daily. 90 tablet 3    metFORMIN (GLUCOPHAGE-XR) 500 MG ER 24hr tablet TAKE 1 TABLET TWICE DAILY WITH MEALS 180 tablet 1    metoprolol succinate (TOPROL-XL) 50 MG 24 hr tablet Take 1 tablet (50 mg total) by mouth once daily. 90 tablet 3    multivitamin (THERAGRAN) per tablet Take 1 tablet by mouth once daily.      sertraline (ZOLOFT) 50 MG tablet Take 1 tablet (50 mg total) by mouth once daily. 90 tablet 3    albuterol (VENTOLIN HFA) 90 mcg/actuation inhaler Inhale 2 puffs into the lungs every 6 (six) hours as needed for Wheezing. Rescue (Patient not taking: Reported on 7/28/2025) 18 g 0    estradioL (ESTRACE) 0.01 % (0.1 mg/gram) vaginal cream 0.5 g of cream intravaginally administered nightly for 2 weeks, then reduce to twice weekly (Patient not taking: Reported on 7/28/2025) 42.5 g 6    meloxicam (MOBIC) 15 MG tablet Take 1 tablet (15 mg total) by mouth daily as needed for Pain. Take with food 30 tablet 1    methocarbamoL (ROBAXIN) 500 MG Tab Take 1 tablet (500 mg total) by mouth 3 (three) times daily as needed (muslce spasm). 30 tablet 1    polyvinyl alcohol, artificial tears, (LIQUIFILM TEARS) 1.4 % ophthalmic solution Apply 1 drop to eye as needed.      tretinoin (RETIN-A) 0.05 % cream Apply topically every evening. Start 3 times weekly then increase to every night as tolerated. Pea sized amount can cover entire face (Patient not taking: Reported on 7/28/2025) 20 g 3     No current facility-administered medications for this visit.

## 2025-07-29 ENCOUNTER — TELEPHONE (OUTPATIENT)
Dept: INTERNAL MEDICINE | Facility: CLINIC | Age: 65
End: 2025-07-29
Payer: MEDICARE

## 2025-07-29 NOTE — TELEPHONE ENCOUNTER
Called pt and she stated  that someone from  the office already take care of the concerns and had schedule an appointment for her.

## 2025-07-29 NOTE — TELEPHONE ENCOUNTER
----- Message from Paula sent at 7/29/2025 12:35 PM CDT -----  Regarding: REquesting a callback  Pt called to request a callback from the nurse concerning issues with monitoring for diabetes.    Pt has issues with the sticking..    Pt can be reached at  [phone]

## 2025-08-13 ENCOUNTER — OFFICE VISIT (OUTPATIENT)
Dept: PRIMARY CARE CLINIC | Facility: CLINIC | Age: 65
End: 2025-08-13
Payer: MEDICARE

## 2025-08-13 VITALS
WEIGHT: 157.44 LBS | TEMPERATURE: 98 F | OXYGEN SATURATION: 97 % | DIASTOLIC BLOOD PRESSURE: 70 MMHG | HEART RATE: 70 BPM | BODY MASS INDEX: 26.88 KG/M2 | HEIGHT: 64 IN | RESPIRATION RATE: 18 BRPM | SYSTOLIC BLOOD PRESSURE: 104 MMHG

## 2025-08-13 DIAGNOSIS — Z00.00 ENCOUNTER FOR MEDICARE ANNUAL WELLNESS EXAM: Primary | ICD-10-CM

## 2025-08-13 DIAGNOSIS — H91.92 HEARING LOSS OF LEFT EAR, UNSPECIFIED HEARING LOSS TYPE: ICD-10-CM

## 2025-08-13 DIAGNOSIS — I10 BENIGN ESSENTIAL HTN: ICD-10-CM

## 2025-08-13 DIAGNOSIS — F41.8 DEPRESSION WITH ANXIETY: ICD-10-CM

## 2025-08-13 DIAGNOSIS — E78.2 MIXED HYPERLIPIDEMIA: ICD-10-CM

## 2025-08-13 DIAGNOSIS — E11.69 TYPE 2 DIABETES MELLITUS WITH HYPERLIPIDEMIA: ICD-10-CM

## 2025-08-13 DIAGNOSIS — E78.5 TYPE 2 DIABETES MELLITUS WITH HYPERLIPIDEMIA: ICD-10-CM

## 2025-08-13 DIAGNOSIS — Z12.31 SCREENING MAMMOGRAM FOR BREAST CANCER: ICD-10-CM

## 2025-08-13 DIAGNOSIS — Z01.118 ENCOUNTER FOR HEARING SCREENING WITH ABNORMAL FINDINGS: ICD-10-CM

## 2025-08-13 DIAGNOSIS — H40.9 GLAUCOMA, UNSPECIFIED GLAUCOMA TYPE, UNSPECIFIED LATERALITY: ICD-10-CM

## 2025-08-13 PROCEDURE — 99999 PR PBB SHADOW E&M-EST. PATIENT-LVL V: CPT | Mod: PBBFAC,HCNC,, | Performed by: NURSE PRACTITIONER

## 2025-08-26 DIAGNOSIS — M54.50 ACUTE RIGHT-SIDED LOW BACK PAIN, UNSPECIFIED WHETHER SCIATICA PRESENT: ICD-10-CM

## 2025-08-26 RX ORDER — MELOXICAM 15 MG/1
TABLET ORAL
Refills: 0 | OUTPATIENT
Start: 2025-08-26

## 2025-08-27 ENCOUNTER — OFFICE VISIT (OUTPATIENT)
Dept: OTOLARYNGOLOGY | Facility: CLINIC | Age: 65
End: 2025-08-27
Payer: MEDICARE

## 2025-08-27 ENCOUNTER — CLINICAL SUPPORT (OUTPATIENT)
Dept: AUDIOLOGY | Facility: CLINIC | Age: 65
End: 2025-08-27
Payer: MEDICARE

## 2025-08-27 DIAGNOSIS — H93.13 TINNITUS, BILATERAL: ICD-10-CM

## 2025-08-27 DIAGNOSIS — H91.93 HIGH FREQUENCY HEARING LOSS OF BOTH EARS: Primary | ICD-10-CM

## 2025-08-27 DIAGNOSIS — H90.3 SENSORINEURAL HEARING LOSS (SNHL) OF BOTH EARS: Primary | ICD-10-CM

## 2025-08-27 PROCEDURE — 92557 COMPREHENSIVE HEARING TEST: CPT | Mod: HCNC,S$GLB,,

## 2025-08-27 PROCEDURE — 92567 TYMPANOMETRY: CPT | Mod: HCNC,S$GLB,,

## 2025-08-27 PROCEDURE — 99999 PR PBB SHADOW E&M-EST. PATIENT-LVL I: CPT | Mod: PBBFAC,HCNC,,

## 2025-08-27 PROCEDURE — 99999 PR PBB SHADOW E&M-EST. PATIENT-LVL III: CPT | Mod: PBBFAC,HCNC,, | Performed by: NURSE PRACTITIONER

## 2025-08-28 ENCOUNTER — CLINICAL SUPPORT (OUTPATIENT)
Dept: REHABILITATION | Facility: HOSPITAL | Age: 65
End: 2025-08-28
Payer: MEDICARE

## 2025-08-28 DIAGNOSIS — M54.50 LUMBAR PAIN: Primary | ICD-10-CM

## 2025-08-28 PROCEDURE — 97162 PT EVAL MOD COMPLEX 30 MIN: CPT | Mod: HCNC,PN

## 2025-08-28 PROCEDURE — 97110 THERAPEUTIC EXERCISES: CPT | Mod: HCNC,PN

## 2025-09-04 ENCOUNTER — CLINICAL SUPPORT (OUTPATIENT)
Dept: REHABILITATION | Facility: HOSPITAL | Age: 65
End: 2025-09-04
Payer: MEDICARE

## 2025-09-04 DIAGNOSIS — M54.50 LUMBAR PAIN: Primary | ICD-10-CM

## 2025-09-04 PROCEDURE — 97110 THERAPEUTIC EXERCISES: CPT | Mod: HCNC,PN,CQ
